# Patient Record
Sex: FEMALE | Race: WHITE | NOT HISPANIC OR LATINO | Employment: OTHER | URBAN - METROPOLITAN AREA
[De-identification: names, ages, dates, MRNs, and addresses within clinical notes are randomized per-mention and may not be internally consistent; named-entity substitution may affect disease eponyms.]

---

## 2019-02-16 ENCOUNTER — HOSPITAL ENCOUNTER (OUTPATIENT)
Dept: URGENT CARE | Facility: CLINIC | Age: 34
Discharge: HOME | End: 2019-02-16
Attending: FAMILY MEDICINE

## 2021-09-15 ENCOUNTER — OFFICE VISIT (OUTPATIENT)
Dept: GASTROENTEROLOGY | Facility: CLINIC | Age: 36
End: 2021-09-15

## 2021-09-15 ENCOUNTER — PREP FOR SURGERY (OUTPATIENT)
Dept: OTHER | Facility: HOSPITAL | Age: 36
End: 2021-09-15

## 2021-09-15 VITALS
TEMPERATURE: 98.4 F | HEIGHT: 62 IN | HEART RATE: 74 BPM | WEIGHT: 234.2 LBS | BODY MASS INDEX: 43.1 KG/M2 | DIASTOLIC BLOOD PRESSURE: 58 MMHG | SYSTOLIC BLOOD PRESSURE: 128 MMHG

## 2021-09-15 DIAGNOSIS — K76.0 FATTY LIVER: Primary | ICD-10-CM

## 2021-09-15 DIAGNOSIS — K75.81 NONALCOHOLIC STEATOHEPATITIS (NASH): ICD-10-CM

## 2021-09-15 DIAGNOSIS — R12 HEARTBURN: ICD-10-CM

## 2021-09-15 DIAGNOSIS — R19.7 DIARRHEA, UNSPECIFIED TYPE: ICD-10-CM

## 2021-09-15 DIAGNOSIS — R10.13 EPIGASTRIC PAIN: ICD-10-CM

## 2021-09-15 DIAGNOSIS — R10.13 EPIGASTRIC PAIN: Primary | ICD-10-CM

## 2021-09-15 PROBLEM — K21.9 GERD (GASTROESOPHAGEAL REFLUX DISEASE): Status: ACTIVE | Noted: 2021-09-15

## 2021-09-15 PROBLEM — F32.A DEPRESSION: Status: ACTIVE | Noted: 2021-09-15

## 2021-09-15 PROBLEM — I10 HYPERTENSION: Status: ACTIVE | Noted: 2021-09-15

## 2021-09-15 PROBLEM — G43.909 MIGRAINE: Status: ACTIVE | Noted: 2021-09-15

## 2021-09-15 PROBLEM — D64.9 ANEMIA: Status: ACTIVE | Noted: 2021-09-15

## 2021-09-15 PROCEDURE — 99204 OFFICE O/P NEW MOD 45 MIN: CPT | Performed by: NURSE PRACTITIONER

## 2021-09-15 RX ORDER — MELATONIN 10 MG
CAPSULE ORAL
COMMUNITY
End: 2023-01-06

## 2021-09-15 RX ORDER — HYDROCHLOROTHIAZIDE 25 MG/1
25 TABLET ORAL DAILY
COMMUNITY
End: 2023-01-06

## 2021-09-15 RX ORDER — MONTELUKAST SODIUM 10 MG/1
10 TABLET ORAL NIGHTLY
COMMUNITY

## 2021-09-15 RX ORDER — PROPRANOLOL HYDROCHLORIDE 20 MG/1
20 TABLET ORAL 2 TIMES DAILY
COMMUNITY

## 2021-09-15 RX ORDER — AMITRIPTYLINE HYDROCHLORIDE 50 MG/1
50 TABLET, FILM COATED ORAL NIGHTLY
COMMUNITY

## 2021-09-15 RX ORDER — OMEPRAZOLE 40 MG/1
40 CAPSULE, DELAYED RELEASE ORAL DAILY
COMMUNITY
End: 2023-01-06

## 2021-09-15 NOTE — PROGRESS NOTES
"Patient Name: Tram García   Visit Date: 09/15/2021   Patient ID: [id number]  Provider: BOSTON Elizabeth    Sex: female  Location:  Location Address:  Location Phone: [phone]  [place] [address] [phone]    YOB: 1985  Age: 35 y.o.   Primary Care Provider Phyllis Meyers APRN      Referring Provider: No ref. provider found        Chief Complaint  fx hx of cirrhosis (Pt states she felt a knot and had a US at [facility] )    History of Present Illness  New pt requests evaluation d/t mom's hx of cirrhosis.  Pt states she feels \"knots\" in her abdomen. C/O abd pain w meals for a couple months, + HB, taking Prilosec 40 mg/d for a couple years but not working well.  No dysphagia. Stable weight.  Has had some loose stools since GB removed around , can have urge FI also. 2-3 stools /day, no nocturnal stool, no blood seen in stool. Has abd cramping before BM also now.  Pt is on Xarelto d/t PE about 2 yrs ago, unknown cause. Denies seeing hematologist.   Pt states liver US showed fatty liver.   Patient does not drink alcohol, she does smoke, she also admits to taking NSAIDs as needed  for migraines  Has had COVID vaccine (2nd dose due 10/5/21)    Past Medical History:   Diagnosis Date   • Acid reflux    • Anemia    • Anxiety 2014   • Asthma    • Cholelithiasis    • Depression    • Foot sprain, right, initial encounter 10/21/2015   • GERD (gastroesophageal reflux disease)    • HTN (hypertension)     gestational   • Migraine headache    • Pineal gland cyst 2014   • Renal cyst 2014       Past Surgical History:   Procedure Laterality Date   •  SECTION     • CHOLECYSTECTOMY     • GALLBLADDER SURGERY     • LASER ABLATION     • TUBAL ABDOMINAL LIGATION  2012       Allergies   Allergen Reactions   • Amoxicillin Hives     Hives and blisters   • Tylenol [Acetaminophen] Hives       Family History   Problem Relation Age of Onset   • Diabetes Mother  " "  • Arthritis Mother    • Other Father         cardio vascular disease   • Diabetes Father    • Kidney failure Father    • Arthritis Father    • Colon cancer Neg Hx         Social History     Tobacco Use   • Smoking status: Current Every Day Smoker     Packs/day: 0.50     Types: Cigarettes   • Smokeless tobacco: Never Used   Vaping Use   • Vaping Use: Never used   Substance Use Topics   • Alcohol use: Never   • Drug use: Never       Objective     Vital Signs:   /58 (BP Location: Left arm, Patient Position: Sitting, Cuff Size: Large Adult)   Pulse 74   Temp 98.4 °F (36.9 °C) (Temporal)   Ht 157.5 cm (62\")   Wt 106 kg (234 lb 3.2 oz)   BMI 42.84 kg/m²       Physical Exam  Constitutional:       General: The patient is not in acute distress.     Appearance: Normal appearance.   HENT:      Head: Normocephalic and atraumatic.      Nose: Nose normal.   Pulmonary:      Effort: Pulmonary effort is normal. No respiratory distress.   Abdominal:      General: Abdomen is flat.      Palpations: Abdomen is soft. There is no mass.      Tenderness: There is no abdominal tenderness--tender in RUQ and epigastric area. There is no guarding.   Musculoskeletal:      Cervical back: Neck supple.      Right lower leg: No edema.      Left lower leg: No edema.   Skin:     General: Skin is warm and dry.   Neurological:      General: No focal deficit present.      Mental Status: The patient is alert and oriented to person, place, and time.      Gait: Gait normal.   Psychiatric:         Mood and Affect: Mood normal.         Speech: Speech normal.         Behavior: Behavior normal.         Thought Content: Thought content normal.     Result Review :   The following data was reviewed by: BOSTON Elizabeth on 09/15/2021:                    Assessment and Plan    Diagnoses and all orders for this visit:    1. Fatty liver (Primary)  -     Hepatitis Panel, Acute; Future  -     Hepatic Function Panel; Future  -     Protime-INR; " Future    2. Diarrhea, unspecified type  Comments:  ?post cholecystectomy  Orders:  -     Enteric Bacterial Panel - Stool, Per Rectum; Future  -     Enteric Parasite Panel - Stool, Per Rectum; Future  -     Fecal Lactoferrin - Stool, Per Rectum; Future  -     Clostridium Difficile Toxin - Stool, Per Rectum; Future    3. Nonalcoholic steatohepatitis (VILLARREAL)   -     Hepatitis Panel, Acute; Future    4. Epigastric pain    5. Heartburn            Follow Up      EGD for abd pain Surgical Risk and Benefits: Possible risks/complications, benefits, and alternatives to surgical or invasive procedure have been explained to patient and/or legal guardian; Patient has been evaluated and can tolerate anesthesia and/or sedation. Risks, benefits, and alternatives to anesthesia and sedation have been explained to patient and/or legal guardian. Xarelto per PCP.   Stools, if abnormal then colonoscopy. Can try Colestid if normal.   Avoid NSAIDs, encouraged smoking cessation  [facility], checking labs for fatty liver as well.  Encourage patient to lose weight,  low-carb diet and written information on fatty liver given to patient.  Encouraged 10% weight loss.  Patient was given instructions and counseling regarding her condition or for health maintenance advice. Please see specific information pulled into the AVS if appropriate.

## 2021-09-20 ENCOUNTER — LAB (OUTPATIENT)
Dept: LAB | Facility: HOSPITAL | Age: 36
End: 2021-09-20

## 2021-09-20 DIAGNOSIS — R19.7 DIARRHEA, UNSPECIFIED TYPE: ICD-10-CM

## 2021-09-20 DIAGNOSIS — K75.81 NONALCOHOLIC STEATOHEPATITIS (NASH): ICD-10-CM

## 2021-09-20 DIAGNOSIS — K76.0 FATTY LIVER: ICD-10-CM

## 2021-09-20 LAB
027 TOXIN: NORMAL
C DIFF TOX GENS STL QL NAA+PROBE: NEGATIVE
HAV IGM SERPL QL IA: NORMAL
HBV CORE IGM SERPL QL IA: NORMAL
HBV SURFACE AG SERPL QL IA: NORMAL
HCV AB SER DONR QL: NORMAL
INR PPP: 0.97 (ref 2–3)
LACTOFERRIN STL QL LA: NEGATIVE
PROTHROMBIN TIME: 10.7 SECONDS (ref 9.4–12)

## 2021-09-20 PROCEDURE — 85610 PROTHROMBIN TIME: CPT

## 2021-09-20 PROCEDURE — 36415 COLL VENOUS BLD VENIPUNCTURE: CPT

## 2021-09-20 PROCEDURE — 87506 IADNA-DNA/RNA PROBE TQ 6-11: CPT

## 2021-09-20 PROCEDURE — 87493 C DIFF AMPLIFIED PROBE: CPT

## 2021-09-20 PROCEDURE — 83630 LACTOFERRIN FECAL (QUAL): CPT

## 2021-09-20 PROCEDURE — 80074 ACUTE HEPATITIS PANEL: CPT

## 2021-09-20 PROCEDURE — 87505 NFCT AGENT DETECTION GI: CPT

## 2021-09-21 LAB
C COLI+JEJ+UPSA DNA STL QL NAA+NON-PROBE: NOT DETECTED
CRYPTOSP DNA STL QL NAA+NON-PROBE: NOT DETECTED
E HISTOLYT DNA STL QL NAA+NON-PROBE: NOT DETECTED
EC STX1+STX2 GENES STL QL NAA+NON-PROBE: NOT DETECTED
G LAMBLIA DNA STL QL NAA+NON-PROBE: NOT DETECTED
S ENT+BONG DNA STL QL NAA+NON-PROBE: NOT DETECTED
SHIGELLA SP+EIEC IPAH ST NAA+NON-PROBE: NOT DETECTED

## 2021-10-05 ENCOUNTER — TELEPHONE (OUTPATIENT)
Dept: GASTROENTEROLOGY | Facility: CLINIC | Age: 36
End: 2021-10-05

## 2021-10-05 PROBLEM — R12 HEARTBURN: Status: ACTIVE | Noted: 2021-10-05

## 2021-10-05 PROBLEM — R10.13 EPIGASTRIC PAIN: Status: ACTIVE | Noted: 2021-10-05

## 2021-10-05 NOTE — TELEPHONE ENCOUNTER
Pt had questions about her labs completed 9.20.21. While most came back normal the INR was low. Patient wants to know is this bad? Patient on for 10.22.21 for EGD/Colon

## 2021-10-05 NOTE — TELEPHONE ENCOUNTER
No please notify patient INR is normal, the first range reported is if you are on anticoagulants, if you look down lower you can see the normal range if not on anticoagulants

## 2021-10-22 ENCOUNTER — ANESTHESIA EVENT (OUTPATIENT)
Dept: GASTROENTEROLOGY | Facility: HOSPITAL | Age: 36
End: 2021-10-22

## 2021-10-22 ENCOUNTER — HOSPITAL ENCOUNTER (OUTPATIENT)
Facility: HOSPITAL | Age: 36
Setting detail: HOSPITAL OUTPATIENT SURGERY
Discharge: HOME | End: 2021-10-22
Attending: INTERNAL MEDICINE | Admitting: INTERNAL MEDICINE

## 2021-10-22 ENCOUNTER — ANESTHESIA (OUTPATIENT)
Dept: GASTROENTEROLOGY | Facility: HOSPITAL | Age: 36
End: 2021-10-22

## 2021-10-22 VITALS
OXYGEN SATURATION: 97 % | SYSTOLIC BLOOD PRESSURE: 112 MMHG | RESPIRATION RATE: 17 BRPM | BODY MASS INDEX: 41.13 KG/M2 | DIASTOLIC BLOOD PRESSURE: 78 MMHG | WEIGHT: 224.87 LBS | TEMPERATURE: 97.5 F | HEART RATE: 87 BPM

## 2021-10-22 DIAGNOSIS — R10.13 EPIGASTRIC PAIN: ICD-10-CM

## 2021-10-22 DIAGNOSIS — R12 HEARTBURN: ICD-10-CM

## 2021-10-22 PROCEDURE — 25010000002 PROPOFOL 10 MG/ML EMULSION: Performed by: NURSE ANESTHETIST, CERTIFIED REGISTERED

## 2021-10-22 PROCEDURE — 43239 EGD BIOPSY SINGLE/MULTIPLE: CPT | Performed by: INTERNAL MEDICINE

## 2021-10-22 PROCEDURE — 88305 TISSUE EXAM BY PATHOLOGIST: CPT | Performed by: INTERNAL MEDICINE

## 2021-10-22 RX ORDER — LIDOCAINE HYDROCHLORIDE 20 MG/ML
INJECTION, SOLUTION INFILTRATION; PERINEURAL AS NEEDED
Status: DISCONTINUED | OUTPATIENT
Start: 2021-10-22 | End: 2021-10-22 | Stop reason: SURG

## 2021-10-22 RX ORDER — SODIUM CHLORIDE, SODIUM LACTATE, POTASSIUM CHLORIDE, CALCIUM CHLORIDE 600; 310; 30; 20 MG/100ML; MG/100ML; MG/100ML; MG/100ML
30 INJECTION, SOLUTION INTRAVENOUS CONTINUOUS
Status: DISCONTINUED | OUTPATIENT
Start: 2021-10-22 | End: 2021-10-22 | Stop reason: HOSPADM

## 2021-10-22 RX ORDER — PROPOFOL 10 MG/ML
VIAL (ML) INTRAVENOUS AS NEEDED
Status: DISCONTINUED | OUTPATIENT
Start: 2021-10-22 | End: 2021-10-22 | Stop reason: SURG

## 2021-10-22 RX ADMIN — PROPOFOL 100 MG: 10 INJECTION, EMULSION INTRAVENOUS at 08:40

## 2021-10-22 RX ADMIN — PROPOFOL 250 MCG/KG/MIN: 10 INJECTION, EMULSION INTRAVENOUS at 08:35

## 2021-10-22 RX ADMIN — PROPOFOL 100 MG: 10 INJECTION, EMULSION INTRAVENOUS at 08:42

## 2021-10-22 RX ADMIN — LIDOCAINE HYDROCHLORIDE 100 MG: 20 INJECTION, SOLUTION INFILTRATION; PERINEURAL at 08:35

## 2021-10-22 RX ADMIN — SODIUM CHLORIDE, POTASSIUM CHLORIDE, SODIUM LACTATE AND CALCIUM CHLORIDE 30 ML/HR: 600; 310; 30; 20 INJECTION, SOLUTION INTRAVENOUS at 07:41

## 2021-10-22 RX ADMIN — PROPOFOL 100 MG: 10 INJECTION, EMULSION INTRAVENOUS at 08:35

## 2021-10-22 NOTE — ANESTHESIA PREPROCEDURE EVALUATION
Anesthesia Evaluation     Patient summary reviewed and Nursing notes reviewed   no history of anesthetic complications:  NPO Solid Status: > 8 hours  NPO Liquid Status: > 2 hours           Airway   Mallampati: II  TM distance: >3 FB  Neck ROM: full  No difficulty expected  Dental    (+) poor dentition    Pulmonary - normal exam    breath sounds clear to auscultation  (+) asthma,  Cardiovascular - normal exam  Exercise tolerance: good (4-7 METS)    Rhythm: regular    (+) hypertension,       Neuro/Psych  (+) headaches, psychiatric history Anxiety,     GI/Hepatic/Renal/Endo    (+)  GERD,  renal disease,     Musculoskeletal (-) negative ROS    Abdominal    Substance History - negative use     OB/GYN negative ob/gyn ROS         Other - negative ROS                       Anesthesia Plan    ASA 2     general   (Total IV Anesthesia    Patient understands anesthesia not responsible for dental damage.  )  intravenous induction     Anesthetic plan, all risks, benefits, and alternatives have been provided, discussed and informed consent has been obtained with: patient.    Plan discussed with CRNA.

## 2021-10-22 NOTE — H&P
Pre Procedure History & Physical    Chief Complaint:   Epigastric pain    Subjective     HPI:   Epigastric pain    Past Medical History:   Past Medical History:   Diagnosis Date   • Acid reflux    • Anemia    • Anxiety 2014   • Asthma    • Cholelithiasis    • Depression    • Foot sprain, right, initial encounter 10/21/2015   • GERD (gastroesophageal reflux disease)    • HTN (hypertension)     gestational   • Migraine headache    • Pineal gland cyst 2014   • Renal cyst 2014       Past Surgical History:  Past Surgical History:   Procedure Laterality Date   •  SECTION     • CHOLECYSTECTOMY     • GALLBLADDER SURGERY     • LASER ABLATION     • TUBAL ABDOMINAL LIGATION  2012       Family History:  Family History   Problem Relation Age of Onset   • Diabetes Mother    • Arthritis Mother    • Other Father         cardio vascular disease   • Diabetes Father    • Kidney failure Father    • Arthritis Father    • Colon cancer Neg Hx        Social History:   reports that she has been smoking cigarettes. She has been smoking about 0.50 packs per day. She has never used smokeless tobacco. She reports that she does not drink alcohol and does not use drugs.    Medications:   Medications Prior to Admission   Medication Sig Dispense Refill Last Dose   • amitriptyline (ELAVIL) 50 MG tablet Take 50 mg by mouth Every Night.   10/21/2021   • hydroCHLOROthiazide (HYDRODIURIL) 25 MG tablet Take 25 mg by mouth Daily.   10/21/2021   • Melatonin 10 MG capsule Take  by mouth.   10/21/2021   • montelukast (SINGULAIR) 10 MG tablet Take 10 mg by mouth Every Night.   10/21/2021   • omeprazole (priLOSEC) 40 MG capsule Take 40 mg by mouth Daily.   10/21/2021   • propranolol (INDERAL) 20 MG tablet Take 20 mg by mouth 2 (Two) Times a Day.   10/21/2021   • rivaroxaban (XARELTO) 20 MG tablet Take 20 mg by mouth Daily.   10/19/2021 at Unknown time   • vitamin D3 125 MCG (5000 UT) capsule capsule Take 5,000 Units by mouth  Daily.   10/21/2021       Allergies:  Sumatriptan, Amoxicillin, and Tylenol [acetaminophen]        Objective     Blood pressure (!) 150/104, pulse 92, temperature 98.6 °F (37 °C), temperature source Temporal, resp. rate 20, weight 102 kg (224 lb 13.9 oz), SpO2 95 %.    Physical Exam   Constitutional: Pt is oriented to person, place, and time and well-developed, well-nourished, and in no distress.   Mouth/Throat: Oropharynx is clear and moist.   Neck: Normal range of motion.   Cardiovascular: Normal rate, regular rhythm and normal heart sounds.    Pulmonary/Chest: Effort normal and breath sounds normal.   Abdominal: Soft. Nontender  Skin: Skin is warm and dry.   Psychiatric: Mood, memory, affect and judgment normal.     Assessment/Plan     Diagnosis:  Epigastric pain    Anticipated Surgical Procedure:  EGD    The risks, benefits, and alternatives of this procedure have been discussed with the patient or the responsible party- the patient understands and agrees to proceed.

## 2021-10-22 NOTE — ANESTHESIA POSTPROCEDURE EVALUATION
Patient: Tram García    Procedure Summary     Date: 10/22/21 Room / Location: [facility] ENDOSCOPY 4 / [facility] ENDOSCOPY    Anesthesia Start: 0831 Anesthesia Stop: 0852    Procedure: ESOPHAGOGASTRODUODENOSCOPY WITH BIOPSIES (N/A ) Diagnosis:       Epigastric pain      Heartburn      (Epigastric pain [R10.13])      (Heartburn [R12])    Surgeons: Feliz Olguin MD Provider: Bud Martinez MD    Anesthesia Type: general ASA Status: 2          Anesthesia Type: general    Vitals  Vitals Value Taken Time   /93 10/22/21 0900   Temp 36.1 °C (97 °F) 10/22/21 0850   Pulse 102 10/22/21 0900   Resp 17 10/22/21 0900   SpO2 94 % 10/22/21 0900           Post Anesthesia Care and Evaluation    Patient location during evaluation: bedside  Patient participation: complete - patient participated  Level of consciousness: awake and alert  Pain management: adequate  Airway patency: patent  Anesthetic complications: No anesthetic complications  PONV Status: none  Cardiovascular status: acceptable  Respiratory status: acceptable  Hydration status: acceptable

## 2021-10-25 LAB
CYTO UR: NORMAL
LAB AP CASE REPORT: NORMAL
LAB AP CLINICAL INFORMATION: NORMAL
PATH REPORT.FINAL DX SPEC: NORMAL
PATH REPORT.GROSS SPEC: NORMAL

## 2022-01-28 ENCOUNTER — TELEPHONE (OUTPATIENT)
Dept: GASTROENTEROLOGY | Facility: CLINIC | Age: 37
End: 2022-01-28

## 2022-01-28 NOTE — TELEPHONE ENCOUNTER
Called pt. About overdue results. Pt. Stated she didn't realize there was a lab that needed to be done and that she will go soon.

## 2022-09-29 ENCOUNTER — OFFICE VISIT (OUTPATIENT)
Dept: NEUROSURGERY | Facility: CLINIC | Age: 37
End: 2022-09-29

## 2022-09-29 VITALS
BODY MASS INDEX: 40.59 KG/M2 | HEIGHT: 62 IN | DIASTOLIC BLOOD PRESSURE: 107 MMHG | WEIGHT: 220.6 LBS | SYSTOLIC BLOOD PRESSURE: 150 MMHG

## 2022-09-29 DIAGNOSIS — M54.42 CHRONIC MIDLINE LOW BACK PAIN WITH BILATERAL SCIATICA: Primary | ICD-10-CM

## 2022-09-29 DIAGNOSIS — M51.36 DDD (DEGENERATIVE DISC DISEASE), LUMBAR: ICD-10-CM

## 2022-09-29 DIAGNOSIS — M51.27 HERNIATED NUCLEUS PULPOSUS, L5-S1: ICD-10-CM

## 2022-09-29 DIAGNOSIS — M54.41 CHRONIC MIDLINE LOW BACK PAIN WITH BILATERAL SCIATICA: Primary | ICD-10-CM

## 2022-09-29 DIAGNOSIS — G89.29 CHRONIC MIDLINE LOW BACK PAIN WITH BILATERAL SCIATICA: Primary | ICD-10-CM

## 2022-09-29 PROCEDURE — 99203 OFFICE O/P NEW LOW 30 MIN: CPT | Performed by: NEUROLOGICAL SURGERY

## 2022-09-29 NOTE — PROGRESS NOTES
"Chief Complaint  Back Pain    Subjective          Tram García who is a 36 y.o. year old female who presents to [facility] for Evaluation of the Spine.     The patient complains of pain located in the lumbar spine.  Patients states the pain has been present for 6 months.  The pain came on acutely.  The pain scale level is 9.  The pain radiates into the bilateral legs to the posterior calf. The back pain is worse than the leg.  The pain is constant and described as dull.  The pain is worse at no particular time of day. Patient states nothing worsens the pain. Patient states nothing makes the pain better.    Associated Symptoms Include: Denies numbness and tingling  Conservative Interventions Include: PT-no help, otherwise no treatments, doesn't want to take pain killers    Was this the result of an injury or accident?: No    History of Previous Spinal Surgery?: No    This patient  reports that she has been smoking cigarettes. She has a 15.00 pack-year smoking history. She has never used smokeless tobacco.    Review of Systems   Musculoskeletal: Positive for arthralgias, back pain and myalgias.        Objective   Vital Signs:   BP (!) 150/107 (BP Location: Left arm, Patient Position: Sitting)   Ht 157.5 cm (62\")   Wt 100 kg (220 lb 9.6 oz)   BMI 40.35 kg/m²       Physical Exam  Pulmonary:      Effort: Pulmonary effort is normal.   Musculoskeletal:      Comments: SLR increases pain in the bilateral calf   Neurological:      Mental Status: She is alert.      Sensory: No sensory deficit.      Motor: No weakness.      Deep Tendon Reflexes: Reflexes normal (1/4).   Psychiatric:         Mood and Affect: Mood normal.         Result Review :   I personally reviewed the patient's MRI scan which shows DDD at L4-5 and L5-S1 with a central disc bulge without notable nerve compression.        Assessment and Plan    Diagnoses and all orders for this visit:    1. 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 DDD (degenerative disc disease), lumbar    3. Herniated nucleus pulposus, L5-S1    Surgery would not likely help the lower back pain. She will monitor for any worsened leg pain.    She will work on core strength, smoking cessation and weight loss as well as avoidance of activities that worsen the pain.    She could possibly benefit from inversion and also TENS.     We discussed the importance of smoking/nicotine cessation. Smoking/nicotine use has multiple health risks. In particular related to the spine, nicotine increases the incidence of lower back pain, speeds up the progression of degenerative disc disease and dramatically reduces healing after spine surgery (particularly a fusion operation).     Follow Up   No follow-ups on file.  Patient was given instructions and counseling regarding her condition or for health maintenance advice. Please see specific information pulled into the AVS if appropriate.

## 2023-01-04 ENCOUNTER — HOSPITAL ENCOUNTER (EMERGENCY)
Facility: HOSPITAL | Age: 38
Discharge: HOME | End: 2023-01-04
Attending: EMERGENCY MEDICINE | Admitting: EMERGENCY MEDICINE
Payer: MEDICARE

## 2023-01-04 VITALS
OXYGEN SATURATION: 99 % | SYSTOLIC BLOOD PRESSURE: 115 MMHG | DIASTOLIC BLOOD PRESSURE: 89 MMHG | BODY MASS INDEX: 40.16 KG/M2 | TEMPERATURE: 98.7 F | RESPIRATION RATE: 20 BRPM | HEIGHT: 62 IN | HEART RATE: 101 BPM | WEIGHT: 218.26 LBS

## 2023-01-04 DIAGNOSIS — J02.9 PHARYNGITIS, UNSPECIFIED ETIOLOGY: Primary | ICD-10-CM

## 2023-01-04 DIAGNOSIS — R05.1 ACUTE COUGH: ICD-10-CM

## 2023-01-04 DIAGNOSIS — Z20.822 ENCOUNTER FOR LABORATORY TESTING FOR COVID-19 VIRUS: ICD-10-CM

## 2023-01-04 DIAGNOSIS — R09.81 NASAL CONGESTION: ICD-10-CM

## 2023-01-04 LAB
FLUAV AG NPH QL: NEGATIVE
FLUBV AG NPH QL IA: NEGATIVE
S PYO AG THROAT QL: NEGATIVE
SARS-COV-2 RNA PNL SPEC NAA+PROBE: NOT DETECTED

## 2023-01-04 PROCEDURE — 96372 THER/PROPH/DIAG INJ SC/IM: CPT

## 2023-01-04 PROCEDURE — 87081 CULTURE SCREEN ONLY: CPT | Performed by: EMERGENCY MEDICINE

## 2023-01-04 PROCEDURE — U0004 COV-19 TEST NON-CDC HGH THRU: HCPCS | Performed by: EMERGENCY MEDICINE

## 2023-01-04 PROCEDURE — C9803 HOPD COVID-19 SPEC COLLECT: HCPCS | Performed by: EMERGENCY MEDICINE

## 2023-01-04 PROCEDURE — 87880 STREP A ASSAY W/OPTIC: CPT | Performed by: EMERGENCY MEDICINE

## 2023-01-04 PROCEDURE — 99283 EMERGENCY DEPT VISIT LOW MDM: CPT

## 2023-01-04 PROCEDURE — 87804 INFLUENZA ASSAY W/OPTIC: CPT | Performed by: EMERGENCY MEDICINE

## 2023-01-04 PROCEDURE — 25010000002 DEXAMETHASONE PER 1 MG

## 2023-01-04 RX ORDER — FLUTICASONE PROPIONATE 50 MCG
2 SPRAY, SUSPENSION (ML) NASAL DAILY
Qty: 11.1 ML | Refills: 0 | Status: SHIPPED | OUTPATIENT
Start: 2023-01-04

## 2023-01-04 RX ORDER — BROMPHENIRAMINE MALEATE, PSEUDOEPHEDRINE HYDROCHLORIDE, AND DEXTROMETHORPHAN HYDROBROMIDE 2; 30; 10 MG/5ML; MG/5ML; MG/5ML
10 SYRUP ORAL 3 TIMES DAILY PRN
Qty: 240 ML | Refills: 0 | Status: SHIPPED | OUTPATIENT
Start: 2023-01-04

## 2023-01-04 RX ORDER — LEVETIRACETAM 500 MG/1
500 TABLET ORAL 2 TIMES DAILY
COMMUNITY

## 2023-01-04 RX ORDER — METHYLPREDNISOLONE 4 MG/1
TABLET ORAL
Qty: 21 TABLET | Refills: 0 | Status: SHIPPED | OUTPATIENT
Start: 2023-01-04

## 2023-01-04 RX ORDER — FAMOTIDINE 40 MG/1
40 TABLET, FILM COATED ORAL DAILY
COMMUNITY

## 2023-01-04 RX ORDER — DEXAMETHASONE SODIUM PHOSPHATE 10 MG/ML
10 INJECTION INTRAMUSCULAR; INTRAVENOUS ONCE
Status: COMPLETED | OUTPATIENT
Start: 2023-01-04 | End: 2023-01-04

## 2023-01-04 RX ADMIN — DEXAMETHASONE SODIUM PHOSPHATE 10 MG: 10 INJECTION INTRAMUSCULAR; INTRAVENOUS at 16:28

## 2023-01-04 NOTE — DISCHARGE INSTRUCTIONS
Please know that your flu test and strep throat test were negative.  Your COVID test remains pending.  The hospital will only call you tomorrow if it is positive.  Please continue to increase your oral fluid intake particularly water or an electrolyte substance such as Powerade or Gatorade.  You may take Tylenol or Motrin as needed for low-grade fevers and general body aches.  Take the medication prescribed to you today as needed.  Return to the ER if you develop any difficulty breathing, chest pain, the worst headache of your life, a fever that you cannot control with Tylenol or Motrin, or severe nausea, vomiting, or diarrhea. Otherwise, you may follow-up with your primary care provider in 5 to 7 days.

## 2023-01-04 NOTE — ED PROVIDER NOTES
Time: 3:43 PM EST  Date of encounter:  2023  Independent Historian/Clinical History and Information was obtained by:   Patient  Chief Complaint: Sore throat    History is limited by: N/A    History of Present Illness:  Patient is a 37 y.o. year old female who presents to the emergency department for evaluation of sore throat which onset this morning. She has tried cough drops and throat spray with no improvement. Pt admits to nausea, nasal congestion, postnasal drip, and occasional dry cough, denies fever, chills, vomiting or diarrhea. States she can't eat anything due to pain, rating it a 10/10. She reports h/o recurrent strep throat since she was a child, last episode about 2 years ago.       Sore Throat  Location:  Generalized  Quality:  Aching  Severity:  Severe  Onset quality:  Sudden  Timing:  Constant  Progression:  Unchanged  Chronicity:  Recurrent  Relieved by:  Nothing  Worsened by:  Nothing  Ineffective treatments: cough drops and throat spray.  Associated symptoms: cough, postnasal drip, sinus congestion and trouble swallowing    Associated symptoms: no chills, no fever, no plugged ear sensation and no shortness of breath        Patient Care Team  Primary Care Provider: Phyllis Meyers APRN    Past Medical History:     Allergies   Allergen Reactions   • Sumatriptan Shortness Of Breath   • Amoxicillin Hives     Hives and blisters   • Tylenol [Acetaminophen] Hives     Past Medical History:   Diagnosis Date   • Abnormal ECG    • Acid reflux    • Anemia    • Anxiety 2014   • Asthma    • Bleeding disorder (HCC)    • Cholelithiasis    • Clotting disorder (HCC)    • Deep vein thrombosis (HCC) 2018   • Depression    • Foot sprain, right, initial encounter 10/21/2015   • GERD (gastroesophageal reflux disease)    • HTN (hypertension)     gestational   • Migraine headache    • Pineal gland cyst 2014   • Renal cyst 2014     Past Surgical History:   Procedure Laterality Date   •   SECTION     • CHOLECYSTECTOMY     • ENDOSCOPY N/A 10/22/2021    Procedure: ESOPHAGOGASTRODUODENOSCOPY WITH BIOPSIES;  Surgeon: Feliz Olguin MD;  Location: [facility] ENDOSCOPY;  Service: Gastroenterology;  Laterality: N/A;  NORMAL EGD   • GALLBLADDER SURGERY     • LASER ABLATION     • TUBAL ABDOMINAL LIGATION  03/27/2012     Family History   Problem Relation Age of Onset   • Diabetes Mother    • Arthritis Mother    • Anxiety disorder Mother    • Hyperlipidemia Mother    • Liver disease Mother    • Stroke Mother    • Thyroid disease Mother    • Other Father         cardio vascular disease   • Diabetes Father    • Kidney failure Father    • Arthritis Father    • Colon cancer Neg Hx        Home Medications:  Prior to Admission medications    Medication Sig Start Date End Date Taking? Authorizing Provider   amitriptyline (ELAVIL) 50 MG tablet Take 50 mg by mouth Every Night.    Khari Mar MD   famotidine (PEPCID) 40 MG tablet Take 40 mg by mouth Daily.    Khari Mar MD   hydroCHLOROthiazide (HYDRODIURIL) 25 MG tablet Take 25 mg by mouth Daily.    Khari Mar MD   levETIRAcetam (KEPPRA) 500 MG tablet Take 500 mg by mouth 2 (Two) Times a Day.    Khari Mar MD   Melatonin 10 MG capsule Take  by mouth.    Khari Mar MD   montelukast (SINGULAIR) 10 MG tablet Take 10 mg by mouth Every Night.    Khari Mar MD   omeprazole (priLOSEC) 40 MG capsule Take 40 mg by mouth Daily.    Khari Mar MD   propranolol (INDERAL) 20 MG tablet Take 20 mg by mouth 2 (Two) Times a Day.    Khari Mar MD   rivaroxaban (XARELTO) 20 MG tablet Take 20 mg by mouth Daily.    Khari Mar MD   vitamin D3 125 MCG (5000 UT) capsule capsule Take 5,000 Units by mouth Daily.    Khari Mar MD        Social History:   Social History     Tobacco Use   • Smoking status: Light Smoker     Packs/day: 1.00     Years: 15.00     Pack years: 15.00     Types:  Cigarettes   • Smokeless tobacco: Never   Vaping Use   • Vaping Use: Never used   Substance Use Topics   • Alcohol use: Never   • Drug use: Never         Review of Systems:  Review of Systems   Constitutional: Negative for chills and fever.   HENT: Positive for postnasal drip, sore throat and trouble swallowing.    Respiratory: Positive for cough. Negative for shortness of breath.    All other systems reviewed and are negative.       Physical Exam:  /89 (BP Location: Right arm, Patient Position: Sitting)   Pulse 101   Temp 98.7 °F (37.1 °C) (Oral)   Resp 20   Ht 157.5 cm (62\")   Wt 99 kg (218 lb 4.1 oz)   SpO2 99%   BMI 39.92 kg/m²     Physical Exam  Vitals and nursing note reviewed.   Constitutional:       General: She is not in acute distress.     Appearance: Normal appearance. She is not toxic-appearing.   HENT:      Head: Normocephalic and atraumatic.      Nose: Congestion present.      Mouth/Throat:      Mouth: Mucous membranes are moist.      Pharynx: Uvula midline. Posterior oropharyngeal erythema present.      Tonsils: 2+ on the right. 2+ on the left.      Comments: Controls her own secretions.  Eyes:      General: No scleral icterus.  Cardiovascular:      Rate and Rhythm: Normal rate and regular rhythm.      Pulses: Normal pulses.      Heart sounds: Normal heart sounds.   Pulmonary:      Effort: Pulmonary effort is normal. No respiratory distress.      Breath sounds: Normal breath sounds.   Abdominal:      General: Abdomen is flat.      Palpations: Abdomen is soft.      Tenderness: There is no abdominal tenderness.   Musculoskeletal:         General: Normal range of motion.      Cervical back: Normal range of motion and neck supple.   Skin:     General: Skin is warm and dry.   Neurological:      Mental Status: She is alert and oriented to person, place, and time. Mental status is at baseline.                  Procedures:  Procedures      Medical Decision Making:      Comorbidities that affect  care:    None    External Notes reviewed:    Previous ED Note      The following orders were placed and all results were independently analyzed by me:  Orders Placed This Encounter   Procedures   • Rapid Strep A Screen - Swab, Throat   • Influenza Antigen, Rapid - Swab, Nasopharynx   • COVID-19,APTIMA PANTHER(TOY),[facility]/ AARTI, NP/OP SWAB IN UTM/VTM/SALINE TRANSPORT MEDIA,24 HR TAT - Swab, Nasopharynx   • Beta Strep Culture, Throat - Swab, Throat       Medications Given in the Emergency Department:  Medications   dexamethasone (DECADRON) injection 10 mg (10 mg Intramuscular Given 1/4/23 0164)        ED Course:         Labs:    Lab Results (last 24 hours)     ** No results found for the last 24 hours. **           Imaging:    No Radiology Exams Resulted Within Past 24 Hours      Differential Diagnosis and Discussion:    Cough: Differential diagnosis includes but is not limited to pneumonia, acute bronchitis, upper respiratory infection, ACE inhibitor use, allergic reaction, epiglottitis, seasonal allergies, chemical irritants, exercise-induced asthma, viral syndrome.    All labs were reviewed and analyzed by me.    MDM  Number of Diagnoses or Management Options  Acute cough: new and does not require workup  Encounter for laboratory testing for COVID-19 virus: new and does not require workup  Nasal congestion: new and does not require workup  Pharyngitis, unspecified etiology: new and does not require workup     Amount and/or Complexity of Data Reviewed  Clinical lab tests: ordered and reviewed  Review and summarize past medical records: yes (I have personally reviewed patient's previous medical encounters.  )    Risk of Complications, Morbidity, and/or Mortality  Presenting problems: low  Diagnostic procedures: low  Management options: low    Patient Progress  Patient progress: stable (17:19 EST Updated patient on results and plan for discharge. Patient expressed understanding and agreement. All questions answered  at this time.)           Patient Care Considerations:          Consultants/Shared Management Plan:    None    Social Determinants of Health:    Patient is independent, reliable, and has access to care.       Disposition and Care Coordination:    Discharged: The patient is suitable and stable for discharge with no need for consideration of observation or admission.    I have explained the patient´s condition, diagnoses and treatment plan based on the information available to me at this time. I have answered questions and addressed any concerns. The patient has a good  understanding of the patient´s diagnosis, condition, and treatment plan as can be expected at this point. The vital signs have been stable. The patient´s condition is stable and appropriate for discharge from the emergency department.      The patient will pursue further outpatient evaluation with the primary care physician or other designated or consulting physician as outlined in the discharge instructions. They are agreeable to this plan of care and follow-up instructions have been explained in detail. The patient has received these instructions in written format and have expressed an understanding of the discharge instructions. The patient is aware that any significant change in condition or worsening of symptoms should prompt an immediate return to this or the closest emergency department or call to 911.    Final diagnoses:   Pharyngitis, unspecified etiology   Nasal congestion   Encounter for laboratory testing for COVID-19 virus   Acute cough        ED Disposition     ED Disposition   Discharge    Condition   Stable    Comment   --             This medical record created using voice recognition software.    Documentation assistance provided by Margaret Veras acting as scribe for BOSTON Núñez. Information recorded by the scribe was done at my direction and has been verified and validated by me.        Rito,  Margaret  01/04/23 1624       Margaret Veras  01/04/23 1719       Jennifer Miles, BOSTON  01/05/23 2013

## 2023-01-05 ENCOUNTER — APPOINTMENT (OUTPATIENT)
Dept: CT IMAGING | Facility: HOSPITAL | Age: 38
End: 2023-01-05
Payer: MEDICARE

## 2023-01-05 ENCOUNTER — NURSE TRIAGE (OUTPATIENT)
Dept: CALL CENTER | Facility: HOSPITAL | Age: 38
End: 2023-01-05
Payer: MEDICARE

## 2023-01-05 LAB
ALBUMIN SERPL-MCNC: 4.4 G/DL (ref 3.5–5.2)
ALBUMIN/GLOB SERPL: 1.4 G/DL
ALP SERPL-CCNC: 94 U/L (ref 39–117)
ALT SERPL W P-5'-P-CCNC: 54 U/L (ref 1–33)
ANION GAP SERPL CALCULATED.3IONS-SCNC: 12.2 MMOL/L (ref 5–15)
AST SERPL-CCNC: 29 U/L (ref 1–32)
BASOPHILS # BLD AUTO: 0.02 10*3/MM3 (ref 0–0.2)
BASOPHILS NFR BLD AUTO: 0.1 % (ref 0–1.5)
BILIRUB SERPL-MCNC: 0.4 MG/DL (ref 0–1.2)
BILIRUB UR QL STRIP: NEGATIVE
BUN SERPL-MCNC: 12 MG/DL (ref 6–20)
BUN/CREAT SERPL: 19.4 (ref 7–25)
CALCIUM SPEC-SCNC: 9.4 MG/DL (ref 8.6–10.5)
CHLORIDE SERPL-SCNC: 107 MMOL/L (ref 98–107)
CLARITY UR: CLEAR
CO2 SERPL-SCNC: 18.8 MMOL/L (ref 22–29)
COLOR UR: YELLOW
CREAT SERPL-MCNC: 0.62 MG/DL (ref 0.57–1)
DEPRECATED RDW RBC AUTO: 43.2 FL (ref 37–54)
EGFRCR SERPLBLD CKD-EPI 2021: 117.1 ML/MIN/1.73
EOSINOPHIL # BLD AUTO: 0.04 10*3/MM3 (ref 0–0.4)
EOSINOPHIL NFR BLD AUTO: 0.2 % (ref 0.3–6.2)
ERYTHROCYTE [DISTWIDTH] IN BLOOD BY AUTOMATED COUNT: 13.6 % (ref 12.3–15.4)
GLOBULIN UR ELPH-MCNC: 3.1 GM/DL
GLUCOSE SERPL-MCNC: 116 MG/DL (ref 65–99)
GLUCOSE UR STRIP-MCNC: NEGATIVE MG/DL
HCG INTACT+B SERPL-ACNC: <0.5 MIU/ML
HCT VFR BLD AUTO: 46.4 % (ref 34–46.6)
HGB BLD-MCNC: 16.4 G/DL (ref 12–15.9)
HGB UR QL STRIP.AUTO: NEGATIVE
HOLD SPECIMEN: NORMAL
HOLD SPECIMEN: NORMAL
IMM GRANULOCYTES # BLD AUTO: 0.14 10*3/MM3 (ref 0–0.05)
IMM GRANULOCYTES NFR BLD AUTO: 0.8 % (ref 0–0.5)
KETONES UR QL STRIP: NEGATIVE
LEUKOCYTE ESTERASE UR QL STRIP.AUTO: NEGATIVE
LIPASE SERPL-CCNC: 34 U/L (ref 13–60)
LYMPHOCYTES # BLD AUTO: 3.38 10*3/MM3 (ref 0.7–3.1)
LYMPHOCYTES NFR BLD AUTO: 19.2 % (ref 19.6–45.3)
MCH RBC QN AUTO: 30.8 PG (ref 26.6–33)
MCHC RBC AUTO-ENTMCNC: 35.3 G/DL (ref 31.5–35.7)
MCV RBC AUTO: 87.1 FL (ref 79–97)
MONOCYTES # BLD AUTO: 1.46 10*3/MM3 (ref 0.1–0.9)
MONOCYTES NFR BLD AUTO: 8.3 % (ref 5–12)
NEUTROPHILS NFR BLD AUTO: 12.61 10*3/MM3 (ref 1.7–7)
NEUTROPHILS NFR BLD AUTO: 71.4 % (ref 42.7–76)
NITRITE UR QL STRIP: NEGATIVE
NRBC BLD AUTO-RTO: 0 /100 WBC (ref 0–0.2)
PH UR STRIP.AUTO: 6.5 [PH] (ref 5–8)
PLATELET # BLD AUTO: 303 10*3/MM3 (ref 140–450)
PMV BLD AUTO: 9.9 FL (ref 6–12)
POTASSIUM SERPL-SCNC: 4.1 MMOL/L (ref 3.5–5.2)
PROT SERPL-MCNC: 7.5 G/DL (ref 6–8.5)
PROT UR QL STRIP: NEGATIVE
RBC # BLD AUTO: 5.33 10*6/MM3 (ref 3.77–5.28)
SODIUM SERPL-SCNC: 138 MMOL/L (ref 136–145)
SP GR UR STRIP: 1.01 (ref 1–1.03)
UROBILINOGEN UR QL STRIP: NORMAL
WBC NRBC COR # BLD: 17.65 10*3/MM3 (ref 3.4–10.8)
WHOLE BLOOD HOLD COAG: NORMAL
WHOLE BLOOD HOLD SPECIMEN: NORMAL

## 2023-01-05 PROCEDURE — 74177 CT ABD & PELVIS W/CONTRAST: CPT

## 2023-01-05 PROCEDURE — 81003 URINALYSIS AUTO W/O SCOPE: CPT

## 2023-01-05 PROCEDURE — 96374 THER/PROPH/DIAG INJ IV PUSH: CPT

## 2023-01-05 PROCEDURE — 85025 COMPLETE CBC W/AUTO DIFF WBC: CPT

## 2023-01-05 PROCEDURE — 25010000002 KETOROLAC TROMETHAMINE PER 15 MG: Performed by: NURSE PRACTITIONER

## 2023-01-05 PROCEDURE — 99283 EMERGENCY DEPT VISIT LOW MDM: CPT

## 2023-01-05 PROCEDURE — 80053 COMPREHEN METABOLIC PANEL: CPT

## 2023-01-05 PROCEDURE — 0 IOPAMIDOL PER 1 ML: Performed by: NURSE PRACTITIONER

## 2023-01-05 PROCEDURE — 83690 ASSAY OF LIPASE: CPT

## 2023-01-05 PROCEDURE — 84702 CHORIONIC GONADOTROPIN TEST: CPT

## 2023-01-05 RX ORDER — KETOROLAC TROMETHAMINE 30 MG/ML
30 INJECTION, SOLUTION INTRAMUSCULAR; INTRAVENOUS ONCE
Status: COMPLETED | OUTPATIENT
Start: 2023-01-05 | End: 2023-01-05

## 2023-01-05 RX ORDER — SODIUM CHLORIDE 0.9 % (FLUSH) 0.9 %
10 SYRINGE (ML) INJECTION AS NEEDED
Status: DISCONTINUED | OUTPATIENT
Start: 2023-01-05 | End: 2023-01-06 | Stop reason: HOSPADM

## 2023-01-05 RX ADMIN — IOPAMIDOL 100 ML: 755 INJECTION, SOLUTION INTRAVENOUS at 23:38

## 2023-01-05 RX ADMIN — KETOROLAC TROMETHAMINE 30 MG: 30 INJECTION, SOLUTION INTRAMUSCULAR; INTRAVENOUS at 22:24

## 2023-01-05 NOTE — TELEPHONE ENCOUNTER
Caller was evaluated [facility] ER and requesting COVID results.    Advised \"My Chart\" or PCP for results.   Reason for Disposition  • Caller requesting lab results    Additional Information  • Negative: Lab calling with strep throat test results and triager can call in prescription  • Negative: Lab calling with urinalysis test results and triager can call in prescription  • Negative: Medication questions  • Negative: ED call to PCP  • Negative: Physician call to PCP  • Negative: Call about patient who is currently hospitalized  • Negative: Lab or radiology calling with CRITICAL test results  • Negative: [1] Prescription not at pharmacy AND [2] was prescribed today by PCP  • Negative: [1] Follow-up call from patient regarding patient's clinical status AND [2] information urgent  • Negative: [1] Caller requests to speak ONLY to PCP AND [2] URGENT question  • Negative: [1] Caller requests to speak to PCP now AND [2] won't tell us reason for call  (Exception: if 10 pm to 6 am, caller must first discuss reason for the call)  • Negative: Notification of hospital admission  • Negative: Notification of death    Answer Assessment - Initial Assessment Questions  1. REASON FOR CALL or QUESTION: \"What is your reason for calling today?\" or \"How can I best  help you?\" or \"What question do you have that I can help answer?\"  Requesting COVID results  2. CALLER: Document the source of call. (e.g., laboratory, patient).      *No Answer*    Protocols used: [facility]CP [facility]

## 2023-01-06 ENCOUNTER — HOSPITAL ENCOUNTER (EMERGENCY)
Facility: HOSPITAL | Age: 38
Discharge: HOME | End: 2023-01-06
Attending: EMERGENCY MEDICINE | Admitting: EMERGENCY MEDICINE
Payer: MEDICARE

## 2023-01-06 VITALS
SYSTOLIC BLOOD PRESSURE: 121 MMHG | HEIGHT: 62 IN | BODY MASS INDEX: 39.92 KG/M2 | HEART RATE: 68 BPM | DIASTOLIC BLOOD PRESSURE: 73 MMHG | WEIGHT: 216.93 LBS | TEMPERATURE: 98.1 F | OXYGEN SATURATION: 96 % | RESPIRATION RATE: 20 BRPM

## 2023-01-06 DIAGNOSIS — R10.84 GENERALIZED ABDOMINAL PAIN: Primary | ICD-10-CM

## 2023-01-06 PROCEDURE — 25010000002 ONDANSETRON PER 1 MG: Performed by: EMERGENCY MEDICINE

## 2023-01-06 PROCEDURE — 25010000002 MORPHINE PER 10 MG: Performed by: EMERGENCY MEDICINE

## 2023-01-06 PROCEDURE — 96375 TX/PRO/DX INJ NEW DRUG ADDON: CPT

## 2023-01-06 RX ORDER — ONDANSETRON 2 MG/ML
4 INJECTION INTRAMUSCULAR; INTRAVENOUS ONCE
Status: COMPLETED | OUTPATIENT
Start: 2023-01-06 | End: 2023-01-06

## 2023-01-06 RX ORDER — DICYCLOMINE HCL 20 MG
20 TABLET ORAL EVERY 6 HOURS
Qty: 20 TABLET | Refills: 0 | Status: SHIPPED | OUTPATIENT
Start: 2023-01-06 | End: 2023-02-23

## 2023-01-06 RX ORDER — ONDANSETRON 4 MG/1
4 TABLET, ORALLY DISINTEGRATING ORAL EVERY 8 HOURS PRN
Qty: 10 TABLET | Refills: 0 | Status: SHIPPED | OUTPATIENT
Start: 2023-01-06

## 2023-01-06 RX ADMIN — ONDANSETRON 4 MG: 2 INJECTION INTRAMUSCULAR; INTRAVENOUS at 05:36

## 2023-01-06 RX ADMIN — MORPHINE SULFATE 4 MG: 4 INJECTION, SOLUTION INTRAMUSCULAR; INTRAVENOUS at 05:36

## 2023-01-06 NOTE — Clinical Note
[facility] EMERGENCY ROOM  [address]  [place] [address]  Phone: [phone]    Ryder Reji accompanied Tram García to the emergency department on 1/5/2023. They may return to work on 01/07/2023.        Thank you for choosing [facility].    Hawa Skelton RN

## 2023-01-06 NOTE — ED PROVIDER NOTES
Time: 04:30 AM EST  Date of encounter:  2023  Independent Historian/Clinical History and Information was obtained by:   Patient  Chief Complaint   Patient presents with   • Abdominal Pain       History is limited by: N/A    History of Present Illness:      Patient is a 37 y.o. year old female who presents to the emergency department for evaluation of c/o abdominal that started at her belly button and is now localized to her LUQ.    Pt reports that symptoms started tonight. Pt denies a fever, chest pain, and diarrhea, but reports vomiting. Pt reports GI issues since her gallbladder removal. Pt reports a history of pancreatitis. No alleviating or aggravating factors      History provided by:  Patient   used: No        Patient Care Team  Primary Care Provider: Phyllis Meyers APRN    Past Medical History:     Allergies   Allergen Reactions   • Sumatriptan Shortness Of Breath   • Amoxicillin Hives     Hives and blisters   • Tylenol [Acetaminophen] Hives     Past Medical History:   Diagnosis Date   • Abnormal ECG    • Acid reflux    • Anemia    • Anxiety 2014   • Asthma    • Bleeding disorder (HCC)    • Cholelithiasis    • Clotting disorder (HCC)    • Deep vein thrombosis (HCC) 2018   • Depression    • Foot sprain, right, initial encounter 10/21/2015   • GERD (gastroesophageal reflux disease)    • HTN (hypertension)     gestational   • Migraine headache    • Pineal gland cyst 2014   • Renal cyst 2014     Past Surgical History:   Procedure Laterality Date   •  SECTION     • CHOLECYSTECTOMY     • ENDOSCOPY N/A 10/22/2021    Procedure: ESOPHAGOGASTRODUODENOSCOPY WITH BIOPSIES;  Surgeon: Feliz Olguin MD;  Location: [facility] ENDOSCOPY;  Service: Gastroenterology;  Laterality: N/A;  NORMAL EGD   • GALLBLADDER SURGERY     • LASER ABLATION     • TUBAL ABDOMINAL LIGATION  2012     Family History   Problem Relation Age of Onset   • Diabetes Mother    • Arthritis  Mother    • Anxiety disorder Mother    • Hyperlipidemia Mother    • Liver disease Mother    • Stroke Mother    • Thyroid disease Mother    • Other Father         cardio vascular disease   • Diabetes Father    • Kidney failure Father    • Arthritis Father    • Colon cancer Neg Hx        Home Medications:  Prior to Admission medications    Medication Sig Start Date End Date Taking? Authorizing Provider   amitriptyline (ELAVIL) 50 MG tablet Take 50 mg by mouth Every Night.    Khari Mar MD   brompheniramine-pseudoephedrine-DM 30-2-10 MG/5ML syrup Take 10 mL by mouth 3 (Three) Times a Day As Needed for Cough or Congestion. 1/4/23   Jennifer Miles APRN   famotidine (PEPCID) 40 MG tablet Take 40 mg by mouth Daily.    Khari Mar MD   fluticasone (FLONASE) 50 MCG/ACT nasal spray 2 sprays into the nostril(s) as directed by provider Daily. 1/4/23   Jennifer Miles APRN   hydroCHLOROthiazide (HYDRODIURIL) 25 MG tablet Take 25 mg by mouth Daily.    Khari Mar MD   levETIRAcetam (KEPPRA) 500 MG tablet Take 500 mg by mouth 2 (Two) Times a Day.    Khari Mar MD   Melatonin 10 MG capsule Take  by mouth.    Khari Mar MD   methylPREDNISolone (MEDROL) 4 MG dose pack Take as directed on package instructions. 1/4/23   Jennifer Miles APRN   montelukast (SINGULAIR) 10 MG tablet Take 10 mg by mouth Every Night.    Khari Mar MD   omeprazole (priLOSEC) 40 MG capsule Take 40 mg by mouth Daily.    Khari Mar MD   propranolol (INDERAL) 20 MG tablet Take 20 mg by mouth 2 (Two) Times a Day.    Khari Mar MD   rivaroxaban (XARELTO) 20 MG tablet Take 20 mg by mouth Daily.    Khari Mar MD   vitamin D3 125 MCG (5000 UT) capsule capsule Take 5,000 Units by mouth Daily.    Khari Mar MD        Social History:   Social History     Tobacco Use   • Smoking status: Former     Packs/day: 1.00     Years: 15.00     Pack  "years: 15.00     Types: Cigarettes   • Smokeless tobacco: Never   Vaping Use   • Vaping Use: Never used   Substance Use Topics   • Alcohol use: Never   • Drug use: Never         Review of Systems:  Review of Systems   Constitutional: Negative for chills and fever.   HENT: Negative for congestion, ear pain and sore throat.    Eyes: Negative for pain.   Respiratory: Negative for cough, chest tightness and shortness of breath.    Cardiovascular: Negative for chest pain.   Gastrointestinal: Positive for abdominal pain and vomiting. Negative for diarrhea and nausea.   Genitourinary: Negative for flank pain and hematuria.   Musculoskeletal: Negative for joint swelling.   Skin: Negative for pallor.   Neurological: Negative for seizures and headaches.   All other systems reviewed and are negative.       Physical Exam:  /73   Pulse 68   Temp 98.1 °F (36.7 °C) (Oral)   Resp 20   Ht 157.5 cm (62\")   Wt 98.4 kg (216 lb 14.9 oz)   SpO2 96%   BMI 39.68 kg/m²     Physical Exam  Vitals and nursing note reviewed.   Constitutional:       General: She is not in acute distress.     Appearance: Normal appearance. She is not toxic-appearing.   HENT:      Head: Normocephalic and atraumatic.      Nose: Nose normal.      Mouth/Throat:      Mouth: Mucous membranes are moist.   Eyes:      General: No scleral icterus.     Extraocular Movements: Extraocular movements intact.      Conjunctiva/sclera: Conjunctivae normal.      Pupils: Pupils are equal, round, and reactive to light.   Cardiovascular:      Rate and Rhythm: Normal rate and regular rhythm.      Pulses: Normal pulses.      Heart sounds: Normal heart sounds.   Pulmonary:      Effort: Pulmonary effort is normal. No respiratory distress.      Breath sounds: Normal breath sounds.   Abdominal:      General: Abdomen is flat. There is no distension.      Palpations: Abdomen is soft.      Tenderness: There is abdominal tenderness (mild diffuse). There is no guarding or rebound. "   Musculoskeletal:         General: Normal range of motion.      Cervical back: Normal range of motion and neck supple.   Skin:     General: Skin is warm and dry.      Capillary Refill: Capillary refill takes less than 2 seconds.   Neurological:      General: No focal deficit present.      Mental Status: She is alert and oriented to person, place, and time. Mental status is at baseline.   Psychiatric:         Mood and Affect: Mood normal.         Behavior: Behavior normal.                  Procedures:  Procedures      Medical Decision Making:      Comorbidities that affect care:    Hypertension    External Notes reviewed:    Previous ED Note      The following orders were placed and all results were independently analyzed by me:  Orders Placed This Encounter   Procedures   • CT Abdomen Pelvis With Contrast   • [place] Draw   • Comprehensive Metabolic Panel   • Lipase   • Urinalysis With Microscopic If Indicated (No Culture) - Urine, Clean Catch   • hCG, Quantitative, Pregnancy   • CBC Auto Differential   • Undress & Gown   • CBC & Differential   • Green Top (Gel)   • Lavender Top   • Gold Top - SST   • Light Blue Top       Medications Given in the Emergency Department:  Medications   ketorolac (TORADOL) injection 30 mg (30 mg Intravenous Given 1/5/23 2224)   iopamidol (ISOVUE-370) 76 % injection 100 mL (100 mL Intravenous Given 1/5/23 2338)   morphine injection 4 mg (4 mg Intravenous Given 1/6/23 0536)   ondansetron (ZOFRAN) injection 4 mg (4 mg Intravenous Given 1/6/23 0536)        ED Course:    The patient was initially evaluated in the triage area where orders were placed. The patient was later dispositioned by Bonifacio Reid MD.      The patient was advised to stay for completion of workup which includes but is not limited to communication of labs and radiological results, reassessment and plan. The patient was advised that leaving prior to disposition by a provider could result in critical findings that  are not communicated to the patient.     ED Course as of 01/06/23 1550   Thu Jan 05, 2023 2216 The patient was seen and evaluated by cecelia Colmenares in triage. Orders were placed and the patient is currently awaiting disposition. [KS]      ED Course User Index  [KS] Torri Colmenares APRN       Labs:    Lab Results (last 24 hours)     Procedure Component Value Units Date/Time    CBC & Differential [[id number]]  (Abnormal) Collected: 01/05/23 2219    Specimen: Blood Updated: 01/05/23 2227    Narrative:      The following orders were created for panel order CBC & Differential.  Procedure                               Abnormality         Status                     ---------                               -----------         ------                     CBC Auto Differential[[id number]]        Abnormal            Final result                 Please view results for these tests on the individual orders.    Comprehensive Metabolic Panel [[id number]]  (Abnormal) Collected: 01/05/23 2219    Specimen: Blood Updated: 01/05/23 2250     Glucose 116 mg/dL      BUN 12 mg/dL      Creatinine 0.62 mg/dL      Sodium 138 mmol/L      Potassium 4.1 mmol/L      Comment: Slight hemolysis detected by analyzer. Results may be affected.        Chloride 107 mmol/L      CO2 18.8 mmol/L      Calcium 9.4 mg/dL      Total Protein 7.5 g/dL      Albumin 4.4 g/dL      ALT (SGPT) 54 U/L      AST (SGOT) 29 U/L      Comment: Slight hemolysis detected by analyzer. Results may be affected.        Alkaline Phosphatase 94 U/L      Total Bilirubin 0.4 mg/dL      Globulin 3.1 gm/dL      A/G Ratio 1.4 g/dL      BUN/Creatinine Ratio 19.4     Anion Gap 12.2 mmol/L      eGFR 117.1 mL/min/1.73      Comment: National Kidney Foundation and American Society of Nephrology (ASN) Task Force recommended calculation based on the Chronic Kidney Disease Epidemiology Collaboration (CKD-EPI) equation refit without adjustment for race.       Narrative:      GFR Normal  >60  Chronic Kidney Disease <60  Kidney Failure <15      Lipase [[id number]]  (Normal) Collected: 01/05/23 2219    Specimen: Blood Updated: 01/05/23 2248     Lipase 34 U/L     Urinalysis With Microscopic If Indicated (No Culture) - Urine, Clean Catch [[id number]]  (Normal) Collected: 01/05/23 2219    Specimen: Urine, Clean Catch Updated: 01/05/23 2256     Color, UA Yellow     Appearance, UA Clear     pH, UA 6.5     Specific Gravity, UA 1.006     Glucose, UA Negative     Ketones, UA Negative     Bilirubin, UA Negative     Blood, UA Negative     Protein, UA Negative     Leuk Esterase, UA Negative     Nitrite, UA Negative     Urobilinogen, UA 0.2 E.U./dL    Narrative:      Urine microscopic not indicated.    hCG, Quantitative, Pregnancy [[id number]] Collected: 01/05/23 2219    Specimen: Blood Updated: 01/05/23 2244     HCG Quantitative <0.50 mIU/mL     Narrative:      HCG Ranges by Gestational Age    Females - non-pregnant premenopausal   </= 1mIU/mL HCG  Females - postmenopausal               </= 7mIU/mL HCG    3 Weeks       5.4   -      72 mIU/mL  4 Weeks      10.2   -     708 mIU/mL  5 Weeks       217   -   8,245 mIU/mL  6 Weeks       152   -  32,177 mIU/mL  7 Weeks     4,059   - 153,767 mIU/mL  8 Weeks    31,366   - 149,094 mIU/mL  9 Weeks    59,109   - 135,901 mIU/mL  10 Weeks   44,186   - 170,409 mIU/mL  12 Weeks   27,107   - 201,615 mIU/mL  14 Weeks   24,302   -  93,646 mIU/mL  15 Weeks   12,540   -  69,747 mIU/mL  16 Weeks    8,904   -  55,332 mIU/mL  17 Weeks    8,240   -  51,793 mIU/mL  18 Weeks    9,649   -  55,271 mIU/mL    Results may be falsely decreased if patient taking Biotin.      CBC Auto Differential [[id number]]  (Abnormal) Collected: 01/05/23 2219    Specimen: Blood Updated: 01/05/23 2227     WBC 17.65 10*3/mm3      RBC 5.33 10*6/mm3      Hemoglobin 16.4 g/dL      Hematocrit 46.4 %      MCV 87.1 fL      MCH 30.8 pg      MCHC 35.3 g/dL      RDW 13.6 %      RDW-SD 43.2 fl      MPV 9.9 fL      Platelets  303 10*3/mm3      Neutrophil % 71.4 %      Lymphocyte % 19.2 %      Monocyte % 8.3 %      Eosinophil % 0.2 %      Basophil % 0.1 %      Immature Grans % 0.8 %      Neutrophils, Absolute 12.61 10*3/mm3      Lymphocytes, Absolute 3.38 10*3/mm3      Monocytes, Absolute 1.46 10*3/mm3      Eosinophils, Absolute 0.04 10*3/mm3      Basophils, Absolute 0.02 10*3/mm3      Immature Grans, Absolute 0.14 10*3/mm3      nRBC 0.0 /100 WBC            Imaging:    CT Abdomen Pelvis With Contrast    Result Date: 1/6/2023  PROCEDURE: CT ABDOMEN PELVIS W CONTRAST  COMPARISONS: 5/5/2022; 8/23/2019.  INDICATIONS: The pt. states upper abdominal pain x 1 day w/ nausea, vomiting, & diarrhea.  TECHNIQUE: After obtaining the patient's consent, 562 CT images were created with non-ionic intravenous contrast material.  No oral contrast agent was administered for the study.  PROTOCOL:   Standard CT imaging protocol performed.    RADIATION:   Total DLP: 709.7 mGy*cm.   Automated exposure control was utilized to minimize radiation dose. CONTRAST: 100 mL Isovue 370 I.V.  FINDINGS: Diffuse hepatic steatosis with hepatomegaly is seen.  No splenomegaly.  The patient has undergone cholecystectomy.  There may be some degree of pancreatic lipomatosis, especially involving the pancreatic tail, seen previously, and not significantly changed.  There is new vague hyperdensity/hyperenhancement involving the medial segment of the left lobe of the liver, which may be related to transient hepatic attenuation/enhancement differences.  This finding is thought less likely to represent a hypervascular mass or vascular malformation.  Consider imaging follow-up if clinically warranted.  No adrenal mass.  There is nonobstructing left nephrolithiasis with a 2 to 3 mm upper pole calyceal stone, as seen on image 158 of series 203.  No definite right nephrolithiasis is seen.  No obstructive uropathy is appreciated due to ureterolithiasis.  No hydronephrosis.  No acute  pyelonephritis.  No acute appendicitis, colitis, or diverticulitis.  There are scattered colonic diverticula.  No mechanical bowel obstruction.  No pneumoperitoneum or pneumatosis.  The patient has undergone bilateral tubal ligation.  There is a suspected benign functional left ovarian cyst, measuring about 3 cm, as seen on image 92 of series 201.  Pelvic phleboliths are seen.  No urinary bladder calculi are identified.  The urinary bladder is underdistended.  No acute fracture or aggressive osseous lesion is seen.  No acute infiltrate is suspected in the partially imaged lung bases.  There may be mild atelectasis.  A tiny fat-containing umbilical hernia is seen.  It does not contain bowel.  Atherosclerotic changes involve the celiac trunk with possible moderate-to-high-grade stenosis at its origin.  This finding is thought to be chronic.  The superior mesenteric and inferior mesenteric arteries are patent.  Mild atherosclerotic change is seen elsewhere in the aortoiliac arterial system.       No acute findings are appreciated.  Please see above comments for further detail.    Please note that portions of this note were completed with a voice recognition program.  ALMAS GARCIA JR, MD       Electronically Signed and Approved By: ALMAS GARCIA JR, MD on 1/06/2023 at 0:28                 Differential Diagnosis and Discussion:      Abdominal Pain: Based on the patient's signs and symptoms, I considered abdominal aortic aneurysm, small bowel obstruction, pancreatitis, acute cholecystitis, acute appendecitis, peptic ulcer disease, gastritis, colitis, endocrine disorders, irritable bowel syndrome and other differential diagnosis an etiology of the patient's abdominal pain.  Vomiting: Differential diagnosis includes but is not limited to migraine, labyrinthine disorders, psychogenic, metabolic and endocrine causes, peptic ulcer, gastric outlet obstruction, gastritis, gastroenteritis, appendicitis, intestinal obstruction,  paralytic ileus, food poisoning, cholecystitis, acute hepatitis, acute pancreatitis, acute febrile illness, and myocardial infarction.    All labs were reviewed and analyzed by me.    MDM  Number of Diagnoses or Management Options  Generalized abdominal pain  Diagnosis management comments: Patient is afebrile and nontoxic appearing. The patient is resting comfortably and feels better, is alert and in no distress. Repeat examination is unremarkable and benign; in particular, there's no discomfort at McBurney's point and there is no pulsatile mass. The history, exam, diagnostic testing, and current condition does not suggest acute appendicitis, bowel obstruction, acute cholecystitis, bowel perforation, major gastrointestinal bleeding, severe diverticulitis, abdominal aortic aneurysm, mesenteric ischemia, volvulus, sepsis, or other significant pathology that warrants further testing, continued ED treatment, admission, for surgical evaluation at this point. The vital signs have been stable. The patient does not have uncontrollable pain, intractable vomiting, or other significant symptoms. The patient's condition is stable and appropriate for discharge from the emergency department. Recommended close follow up with their primary care physician. Discussed return precautions, discharge instructions and answered all their questions.         Amount and/or Complexity of Data Reviewed  Clinical lab tests: ordered and reviewed  Tests in the radiology section of CPT®: ordered and reviewed  Review and summarize past medical records: yes  Independent visualization of images, tracings, or specimens: yes    Risk of Complications, Morbidity, and/or Mortality  Presenting problems: low  Management options: low             Patient Care Considerations:    None      Consultants/Shared Management Plan:    None    Social Determinants of Health:    Patient is independent, reliable, and has access to care.       Disposition and Care  Coordination:    Discharged: I considered escalation of care by admitting this patient for observation, however the patient has improved and is suitable and  stable for discharge.    I have explained the patient´s condition, diagnoses and treatment plan based on the information available to me at this time. I have answered questions and addressed any concerns. The patient has a good  understanding of the patient´s diagnosis, condition, and treatment plan as can be expected at this point. The vital signs have been stable. The patient´s condition is stable and appropriate for discharge from the emergency department.      The patient will pursue further outpatient evaluation with the primary care physician or other designated or consulting physician as outlined in the discharge instructions. They are agreeable to this plan of care and follow-up instructions have been explained in detail. The patient has received these instructions in written format and have expressed an understanding of the discharge instructions. The patient is aware that any significant change in condition or worsening of symptoms should prompt an immediate return to this or the closest emergency department or call to 911.  I have explained discharge medications and the need for follow up with the patient/caretakers. This was also printed in the discharge instructions. Patient was discharged with the following medications and follow up:      Medication List      New Prescriptions    dicyclomine 20 MG tablet  Commonly known as: BENTYL  Take 1 tablet by mouth Every 6 (Six) Hours.     ondansetron ODT 4 MG disintegrating tablet  Commonly known as: ZOFRAN-ODT  Place 1 tablet on the tongue Every 8 (Eight) Hours As Needed for Nausea or Vomiting.           Where to Get Your Medications      These medications were sent to [facility] [place], [place] - [address] - [phone] [facility] [phone] FX  [address] [facility] [place] [address]     Phone: [phone]   · dicyclomine 20 MG tablet  · ondansetron ODT 4 MG disintegrating tablet      Phyllis Meyers, APRN  [address] [place], [place] [id number] [phone]    In 2 days         Final diagnoses:   Generalized abdominal pain        ED Disposition     ED Disposition   Discharge    Condition   Stable    Comment   --             This medical record created using voice recognition software.      Documentation assistance provided by Silverio Lozada acting as scribe for Bonifacio Reid MD. Information recorded by the scribe was done at my direction and has been verified and validated by me.        Silverio Lozada  01/06/23 9101       Bonifacio Reid MD  01/06/23 9973

## 2023-01-07 LAB — BACTERIA SPEC AEROBE CULT: NORMAL

## 2023-02-22 NOTE — PROGRESS NOTES
"Chief Complaint  Abdominal pain     Tram García is a 37 y.o. female who presents to [facility] for abdominal pain     History of present Illness  Established care 9/2021 for \"knots\" in her abdomen, abdominal pain with meals, heartburn, and fatty liver.  Stool studies 9/20/2021-negative C. difficile, lactoferrin, parasite, and bacteria    EGD 10/22/2021 by Dr. Olguin -normal esophagus, stomach, and duodenum.  Biopsies consistent with reflux esophagitis.    CT 01/05/2023: Diffuse hepatic steatosis, hepatomegaly, evidence of cholecystectomy, pancreatic lipomatosis    Patient presents to the office for abdominal pain.  Patient has pertinent history of hospitalization 11/29/2022 for idiopathic acute pancreatitis.  Patient continues to have periumbilical pain that radiates to her left and right side.  Pain is persistent, not necessarily precipitated by eating.  Abdomen is tender to touch.  Heartburn well controlled with Prilosec 40 mg daily.  Denies nausea, vomiting, and dysphagia.  Patient has long history of loose bowel movements since cholecystectomy.  Reports 3-4 bowel movements daily.  Denies melena and hematochezia. Denies alcohol use, IV drug use, unprofessional tattoos, history of or exposure to hepatitis, and history of blood transfusions.  Pertinent family history of cirrhosis from unknown cause in her mother.    Past Medical History:   Diagnosis Date   • Abnormal ECG    • Acid reflux    • Anemia    • Anxiety 02/17/2014   • Asthma    • Bleeding disorder (HCC)    • Cholelithiasis    • Clotting disorder (HCC)    • Deep vein thrombosis (HCC) 2018   • Depression    • Foot sprain, right, initial encounter 10/21/2015   • GERD (gastroesophageal reflux disease)    • HTN (hypertension)     gestational   • Migraine headache    • Pancreatitis 11/2022    [facility] -[facility]PAT[facility]   • Pineal gland cyst 02/21/2014   • Renal cyst 02/21/2014       Past Surgical History:   Procedure " Laterality Date   •  SECTION     • CHOLECYSTECTOMY     • ENDOSCOPY N/A 10/22/2021    Procedure: ESOPHAGOGASTRODUODENOSCOPY WITH BIOPSIES;  Surgeon: Feliz Olguin MD;  Location: [facility] ENDOSCOPY;  Service: Gastroenterology;  Laterality: N/A;  NORMAL EGD   • GALLBLADDER SURGERY     • LASER ABLATION     • TUBAL ABDOMINAL LIGATION  2012         Current Outpatient Medications:   •  amitriptyline (ELAVIL) 50 MG tablet, Take 50 mg by mouth Every Night., Disp: , Rfl:   •  Cholecalciferol (Vitamin D3) 1.25 MG (43196 UT) capsule, Take 1 capsule by mouth 1 (One) Time Per Week., Disp: , Rfl:   •  famotidine (PEPCID) 40 MG tablet, Take 40 mg by mouth Daily., Disp: , Rfl:   •  levETIRAcetam (KEPPRA) 500 MG tablet, Take 500 mg by mouth 2 (Two) Times a Day., Disp: , Rfl:   •  montelukast (SINGULAIR) 10 MG tablet, Take 10 mg by mouth Every Night., Disp: , Rfl:   •  naproxen (NAPROSYN) 500 MG tablet, Take 1 tablet by mouth Every 12 (Twelve) Hours., Disp: , Rfl:   •  omeprazole (priLOSEC) 40 MG capsule, Take 1 capsule by mouth Daily., Disp: , Rfl:   •  ondansetron ODT (ZOFRAN-ODT) 4 MG disintegrating tablet, Place 1 tablet on the tongue Every 8 (Eight) Hours As Needed for Nausea or Vomiting., Disp: 10 tablet, Rfl: 0  •  propranolol (INDERAL) 20 MG tablet, Take 20 mg by mouth 2 (Two) Times a Day., Disp: , Rfl:   •  rivaroxaban (XARELTO) 20 MG tablet, Take 20 mg by mouth Daily., Disp: , Rfl:   •  sertraline (ZOLOFT) 50 MG tablet, Take 1 tablet by mouth Daily., Disp: , Rfl:   •  sucralfate (CARAFATE) 1 g tablet, TAKE ONE TABLET BY MOUTH FOUR TIMES DAILY (BEFORE MEALS AND nightly), Disp: , Rfl:   •  brompheniramine-pseudoephedrine-DM 30-2-10 MG/5ML syrup, Take 10 mL by mouth 3 (Three) Times a Day As Needed for Cough or Congestion., Disp: 240 mL, Rfl: 0  •  colestipol (COLESTID) 1 g tablet, Take 1-2 tablets by mouth 2 (Two) Times a Day., Disp: 120 tablet, Rfl: 1  •  fluticasone (FLONASE) 50 MCG/ACT nasal spray, 2  "sprays into the nostril(s) as directed by provider Daily., Disp: 11.1 mL, Rfl: 0  •  methylPREDNISolone (MEDROL) 4 MG dose pack, Take as directed on package instructions., Disp: 21 tablet, Rfl: 0  •  oxyCODONE (ROXICODONE) 5 MG immediate release tablet, TAKE ONE TABLET BY MOUTH EVERY 4 HOURS AS NEEDED FOR UP TO 2 DAYS, Disp: , Rfl:   •  PEG 3350-KCl-NaBcb-NaCl-NaSulf (Golytely) 227.1 g pack, Take 4,000 mL by mouth 1 (One) Time for 1 dose. Take as directed by the office for colon prep, Disp: 1 each, Rfl: 0  •  vitamin D3 125 MCG (5000 UT) capsule capsule, Take 5,000 Units by mouth Daily., Disp: , Rfl:      Allergies   Allergen Reactions   • Sumatriptan Shortness Of Breath   • Amoxicillin Hives     Hives and blisters   • Tylenol [Acetaminophen] Hives       Family History   Problem Relation Age of Onset   • Diabetes Mother    • Arthritis Mother    • Anxiety disorder Mother    • Hyperlipidemia Mother    • Liver disease Mother    • Stroke Mother    • Thyroid disease Mother    • Other Father         cardio vascular disease   • Diabetes Father    • Kidney failure Father    • Arthritis Father    • Colon cancer Neg Hx         Social History     Social History Narrative   • Not on file       Objective       Vital Signs:   /94 (BP Location: Left arm, Patient Position: Sitting, Cuff Size: Adult)   Pulse 84   Ht 157.5 cm (62\")   Wt 101 kg (223 lb 3.2 oz)   SpO2 97%   BMI 40.82 kg/m²       Physical Exam  Constitutional:       Appearance: Normal appearance.   HENT:      Head: Normocephalic.   Cardiovascular:      Rate and Rhythm: Normal rate and regular rhythm.      Heart sounds: Normal heart sounds.   Pulmonary:      Effort: Pulmonary effort is normal.      Breath sounds: Normal breath sounds.   Abdominal:      General: Bowel sounds are normal.      Palpations: Abdomen is soft.   Skin:     General: Skin is warm and dry.   Neurological:      Mental Status: She is alert and oriented to person, place, and time. Mental " status is at baseline.   Psychiatric:         Mood and Affect: Mood normal.         Behavior: Behavior normal.         Thought Content: Thought content normal.         Judgment: Judgment normal.         Result Review :       CBC w/diff    CBC w/Diff 1/5/23   WBC 17.65 (A)   RBC 5.33 (A)   Hemoglobin 16.4 (A)   Hematocrit 46.4   MCV 87.1   MCH 30.8   MCHC 35.3   RDW 13.6   Platelets 303   Neutrophil Rel % 71.4   Immature Granulocyte Rel % 0.8 (A)   Lymphocyte Rel % 19.2 (A)   Monocyte Rel % 8.3   Eosinophil Rel % 0.2 (A)   Basophil Rel % 0.1   (A) Abnormal value            CMP    CMP 1/5/23   Glucose 116 (A)   BUN 12   Creatinine 0.62   eGFR 117.1   Sodium 138   Potassium 4.1   Chloride 107   Calcium 9.4   Total Protein 7.5   Albumin 4.4   Globulin 3.1   Total Bilirubin 0.4   Alkaline Phosphatase 94   AST (SGOT) 29   ALT (SGPT) 54 (A)   Albumin/Globulin Ratio 1.4   BUN/Creatinine Ratio 19.4   Anion Gap 12.2   (A) Abnormal value       Comments are available for some flowsheets but are not being displayed.                         Assessment and Plan    Diagnoses and all orders for this visit:    1. History of pancreatitis (Primary)  -     Calcium, Ionized; Future  -     IgG 4; Future  -     Triglycerides; Future  -     MRI abdomen w wo contrast mrcp; Future    2. Elevated liver enzymes  -     AFP Tumor Marker; Future  -     Alpha - 1 - Antitrypsin; Future  -     DENISE; Future  -     Hepatic Function Panel; Future  -     Hepatitis Panel, Acute; Future  -     Iron Profile; Future  -     Ferritin; Future  -     Copper, Serum; Future  -     Protime-INR; Future  -     Ceruloplasmin; Future  -     Mitochondrial Antibodies, M2; Future  -     Anti-Smooth Muscle Antibody Titer; Future  -     Immunofixation, Serum; Future    3. Hepatic steatosis    4. Hepatomegaly    5. Abnormal findings on diagnostic imaging of liver and biliary tract  -     AFP Tumor Marker; Future    6. Abnormal results of liver function studies  -      Hepatitis Panel, Acute; Future  -     Protime-INR; Future    7. Abnormal findings on diagnostic imaging of other parts of digestive tract  -     Triglycerides; Future    8. Generalized abdominal pain    9. Gastroesophageal reflux disease with esophagitis without hemorrhage    10. Diarrhea, unspecified type    Other orders  -     PEG 3350-KCl-NaBcb-NaCl-NaSulf (Golytely) 227.1 g pack; Take 4,000 mL by mouth 1 (One) Time for 1 dose. Take as directed by the office for colon prep  Dispense: 1 each; Refill: 0  -     colestipol (COLESTID) 1 g tablet; Take 1-2 tablets by mouth 2 (Two) Times a Day.  Dispense: 120 tablet; Refill: 1      MRI MRCP ordered-scheduled at [facility] for open MRI due to claustrophobia  Complete liver work-up ordered today.  Trial of Bentyl for management of abdominal pain.  Trial of Colestid prescribed due to diarrhea.  Denies cardiopulmonary history.  EGD/COLONOSCOPY Surgical Risk and Benefits: Possible risk/complications, benefits, and alternatives to surgical or invasive procedure have been explained to patient and/or legal guardian. Risks include bleeding, infection, and perforation. Patient has been evaluated and can tolerate anesthesia and/or sedation. Risk, benefits, and alternatives to anesthesia and sedation have been explained to patient and/or legal guardian.   Patient will follow-up after endoscopy.    Follow Up   No follow-ups on file.  Patient was given instructions and counseling regarding her condition or for health maintenance advice. Please see specific information pulled into the AVS if appropriate.

## 2023-02-23 ENCOUNTER — PREP FOR SURGERY (OUTPATIENT)
Dept: OTHER | Facility: HOSPITAL | Age: 38
End: 2023-02-23
Payer: MEDICARE

## 2023-02-23 ENCOUNTER — OFFICE VISIT (OUTPATIENT)
Dept: GASTROENTEROLOGY | Facility: CLINIC | Age: 38
End: 2023-02-23
Payer: MEDICARE

## 2023-02-23 VITALS
SYSTOLIC BLOOD PRESSURE: 139 MMHG | DIASTOLIC BLOOD PRESSURE: 94 MMHG | OXYGEN SATURATION: 97 % | BODY MASS INDEX: 41.07 KG/M2 | WEIGHT: 223.2 LBS | HEIGHT: 62 IN | HEART RATE: 84 BPM

## 2023-02-23 DIAGNOSIS — R93.2 ABNORMAL FINDINGS ON DIAGNOSTIC IMAGING OF LIVER AND BILIARY TRACT: ICD-10-CM

## 2023-02-23 DIAGNOSIS — K76.0 HEPATIC STEATOSIS: ICD-10-CM

## 2023-02-23 DIAGNOSIS — K21.00 GASTROESOPHAGEAL REFLUX DISEASE WITH ESOPHAGITIS WITHOUT HEMORRHAGE: ICD-10-CM

## 2023-02-23 DIAGNOSIS — R93.3 ABNORMAL FINDINGS ON DIAGNOSTIC IMAGING OF OTHER PARTS OF DIGESTIVE TRACT: ICD-10-CM

## 2023-02-23 DIAGNOSIS — R16.0 HEPATOMEGALY: ICD-10-CM

## 2023-02-23 DIAGNOSIS — R94.5 ABNORMAL RESULTS OF LIVER FUNCTION STUDIES: ICD-10-CM

## 2023-02-23 DIAGNOSIS — R10.84 GENERALIZED ABDOMINAL PAIN: ICD-10-CM

## 2023-02-23 DIAGNOSIS — Z87.19 HISTORY OF PANCREATITIS: Primary | ICD-10-CM

## 2023-02-23 DIAGNOSIS — R74.8 ELEVATED LIVER ENZYMES: ICD-10-CM

## 2023-02-23 DIAGNOSIS — R19.7 DIARRHEA, UNSPECIFIED TYPE: ICD-10-CM

## 2023-02-23 PROCEDURE — 99214 OFFICE O/P EST MOD 30 MIN: CPT

## 2023-02-23 RX ORDER — POLYETHYLENE GLYCOL 3350, SODIUM SULFATE ANHYDROUS, SODIUM BICARBONATE, SODIUM CHLORIDE, POTASSIUM CHLORIDE 227.1; 21.5; 6.36; 5.53; .754 G/L; G/L; G/L; G/L; G/L
4000 POWDER, FOR SOLUTION ORAL ONCE
Qty: 1 EACH | Refills: 0 | Status: SHIPPED | OUTPATIENT
Start: 2023-02-23 | End: 2023-02-23

## 2023-02-23 RX ORDER — OXYCODONE HYDROCHLORIDE 5 MG/1
TABLET ORAL
COMMUNITY
Start: 2022-12-02

## 2023-02-23 RX ORDER — NAPROXEN 500 MG/1
1 TABLET ORAL EVERY 12 HOURS SCHEDULED
COMMUNITY
Start: 2023-01-03

## 2023-02-23 RX ORDER — MONTELUKAST SODIUM 4 MG/1
1-2 TABLET, CHEWABLE ORAL 2 TIMES DAILY
Qty: 120 TABLET | Refills: 1 | Status: SHIPPED | OUTPATIENT
Start: 2023-02-23

## 2023-02-23 RX ORDER — SUCRALFATE 1 G/1
TABLET ORAL
COMMUNITY
Start: 2023-01-31

## 2023-02-23 RX ORDER — OMEPRAZOLE 40 MG/1
1 CAPSULE, DELAYED RELEASE ORAL DAILY
COMMUNITY
Start: 2023-01-13

## 2023-02-23 RX ORDER — CHOLECALCIFEROL (VITAMIN D3) 1250 MCG
1 CAPSULE ORAL WEEKLY
COMMUNITY
Start: 2023-01-03

## 2023-02-23 NOTE — PATIENT INSTRUCTIONS
Colestid - this medication helps manage diarrhea, take 1-2 tablets twice daily. Titrate medication as needed.   Bentyl (dicyclomine) 20mg - this medication treats abdominal pain, take every 6 hours as needed

## 2023-02-27 ENCOUNTER — LAB (OUTPATIENT)
Dept: LAB | Facility: HOSPITAL | Age: 38
End: 2023-02-27
Payer: MEDICARE

## 2023-02-27 DIAGNOSIS — R74.8 ELEVATED LIVER ENZYMES: ICD-10-CM

## 2023-02-27 DIAGNOSIS — R94.5 ABNORMAL RESULTS OF LIVER FUNCTION STUDIES: ICD-10-CM

## 2023-02-27 DIAGNOSIS — R93.2 ABNORMAL FINDINGS ON DIAGNOSTIC IMAGING OF LIVER AND BILIARY TRACT: ICD-10-CM

## 2023-02-27 DIAGNOSIS — Z87.19 HISTORY OF PANCREATITIS: ICD-10-CM

## 2023-02-27 DIAGNOSIS — R93.3 ABNORMAL FINDINGS ON DIAGNOSTIC IMAGING OF OTHER PARTS OF DIGESTIVE TRACT: ICD-10-CM

## 2023-02-27 LAB
ALBUMIN SERPL-MCNC: 4.7 G/DL (ref 3.5–5.2)
ALP SERPL-CCNC: 92 U/L (ref 39–117)
ALPHA-FETOPROTEIN: 4.61 NG/ML (ref 0–8.3)
ALPHA1 GLOB MFR UR ELPH: 125 MG/DL (ref 90–200)
ALT SERPL W P-5'-P-CCNC: 47 U/L (ref 1–33)
AST SERPL-CCNC: 38 U/L (ref 1–32)
BILIRUB CONJ SERPL-MCNC: <0.2 MG/DL (ref 0–0.3)
BILIRUB INDIRECT SERPL-MCNC: ABNORMAL MG/DL
BILIRUB SERPL-MCNC: 0.5 MG/DL (ref 0–1.2)
CERULOPLASMIN SERPL-MCNC: 25 MG/DL (ref 19–39)
FERRITIN SERPL-MCNC: 659 NG/ML (ref 13–150)
HAV IGM SERPL QL IA: NORMAL
HBV CORE IGM SERPL QL IA: NORMAL
HBV SURFACE AG SERPL QL IA: NORMAL
HCV AB SER DONR QL: NORMAL
INR PPP: 0.91 (ref 0.86–1.15)
IRON 24H UR-MRATE: 90 MCG/DL (ref 37–145)
IRON SATN MFR SERPL: 22 % (ref 20–50)
PROT SERPL-MCNC: 7.5 G/DL (ref 6–8.5)
PROTHROMBIN TIME: 12.4 SECONDS (ref 11.8–14.9)
TIBC SERPL-MCNC: 417 MCG/DL (ref 298–536)
TRANSFERRIN SERPL-MCNC: 280 MG/DL (ref 200–360)
TRIGL SERPL-MCNC: 261 MG/DL (ref 0–150)

## 2023-02-27 PROCEDURE — 82525 ASSAY OF COPPER: CPT

## 2023-02-27 PROCEDURE — 86381 MITOCHONDRIAL ANTIBODY EACH: CPT

## 2023-02-27 PROCEDURE — 83540 ASSAY OF IRON: CPT

## 2023-02-27 PROCEDURE — 86015 ACTIN ANTIBODY EACH: CPT

## 2023-02-27 PROCEDURE — 82103 ALPHA-1-ANTITRYPSIN TOTAL: CPT

## 2023-02-27 PROCEDURE — 84466 ASSAY OF TRANSFERRIN: CPT

## 2023-02-27 PROCEDURE — 82105 ALPHA-FETOPROTEIN SERUM: CPT

## 2023-02-27 PROCEDURE — 82330 ASSAY OF CALCIUM: CPT

## 2023-02-27 PROCEDURE — 86038 ANTINUCLEAR ANTIBODIES: CPT

## 2023-02-27 PROCEDURE — 82728 ASSAY OF FERRITIN: CPT

## 2023-02-27 PROCEDURE — 86334 IMMUNOFIX E-PHORESIS SERUM: CPT

## 2023-02-27 PROCEDURE — 80074 ACUTE HEPATITIS PANEL: CPT

## 2023-02-27 PROCEDURE — 36415 COLL VENOUS BLD VENIPUNCTURE: CPT

## 2023-02-27 PROCEDURE — 80076 HEPATIC FUNCTION PANEL: CPT

## 2023-02-27 PROCEDURE — 85610 PROTHROMBIN TIME: CPT

## 2023-02-27 PROCEDURE — 84478 ASSAY OF TRIGLYCERIDES: CPT

## 2023-02-27 PROCEDURE — 86225 DNA ANTIBODY NATIVE: CPT

## 2023-02-27 PROCEDURE — 82784 ASSAY IGA/IGD/IGG/IGM EACH: CPT

## 2023-02-27 PROCEDURE — 82787 IGG 1 2 3 OR 4 EACH: CPT

## 2023-02-27 PROCEDURE — 82390 ASSAY OF CERULOPLASMIN: CPT

## 2023-02-28 LAB
CA-I BLD-MCNC: 5.1 MG/DL (ref 4.6–5.4)
CA-I SERPL ISE-MCNC: 1.28 MMOL/L (ref 1.15–1.35)
DSDNA IGG SERPL IA-ACNC: NEGATIVE [IU]/ML
IGA SERPL-MCNC: 117 MG/DL (ref 87–352)
IGG SERPL-MCNC: 1146 MG/DL (ref 586–1602)
IGG4 SER-MCNC: 27 MG/DL (ref 2–96)
IGM SERPL-MCNC: 132 MG/DL (ref 26–217)
MITOCHONDRIA M2 IGG SER-ACNC: <20 UNITS (ref 0–20)
NUCLEAR IGG SER IA-RTO: NEGATIVE
PROT PATTERN SERPL IFE-IMP: NORMAL
SMA IGG SER-ACNC: 11 UNITS (ref 0–19)

## 2023-03-01 LAB — COPPER SERPL-MCNC: 130 UG/DL (ref 80–158)

## 2023-03-13 DIAGNOSIS — Z87.19 HISTORY OF PANCREATITIS: ICD-10-CM

## 2023-03-14 ENCOUNTER — TELEPHONE (OUTPATIENT)
Dept: GASTROENTEROLOGY | Facility: CLINIC | Age: 38
End: 2023-03-14
Payer: MEDICARE

## 2023-03-14 NOTE — TELEPHONE ENCOUNTER
----- Message from BOSTON Burns sent at 3/14/2023  9:21 AM EDT -----  I have reviewed the patient's MRI MRCP which shows benign chronic fatty infiltration of the pancreas and liver, and liver is moderately enlarged.

## 2023-03-14 NOTE — TELEPHONE ENCOUNTER
Called pt. No answer. Left voicemail for pt to call back.     RE: MRI RESULT --- RESULT NOTES/In Basket

## 2023-05-08 ENCOUNTER — TELEPHONE (OUTPATIENT)
Dept: GASTROENTEROLOGY | Facility: CLINIC | Age: 38
End: 2023-05-08
Payer: MEDICARE

## 2023-05-08 NOTE — TELEPHONE ENCOUNTER
Yuliya with Dr Esparza's office called in requesting blood thinner clearance be faxed to their office. She stated she had received a request to contact us in regards to this but was not sure of the medication the patient is on. She requests the letter be faxed to [phone].

## 2023-05-09 NOTE — TELEPHONE ENCOUNTER
Pt called office, states she is prescribe blood thinner-xarelto by pcp melissa Ford clearance letter

## 2023-06-02 ENCOUNTER — ANESTHESIA EVENT (OUTPATIENT)
Dept: GASTROENTEROLOGY | Facility: HOSPITAL | Age: 38
End: 2023-06-02
Payer: MEDICARE

## 2023-06-02 ENCOUNTER — HOSPITAL ENCOUNTER (OUTPATIENT)
Facility: HOSPITAL | Age: 38
Setting detail: HOSPITAL OUTPATIENT SURGERY
Discharge: HOME | End: 2023-06-02
Attending: INTERNAL MEDICINE | Admitting: INTERNAL MEDICINE
Payer: MEDICARE

## 2023-06-02 ENCOUNTER — ANESTHESIA (OUTPATIENT)
Dept: GASTROENTEROLOGY | Facility: HOSPITAL | Age: 38
End: 2023-06-02
Payer: MEDICARE

## 2023-06-02 VITALS
WEIGHT: 216.93 LBS | OXYGEN SATURATION: 96 % | BODY MASS INDEX: 39.68 KG/M2 | HEART RATE: 89 BPM | RESPIRATION RATE: 20 BRPM | SYSTOLIC BLOOD PRESSURE: 105 MMHG | TEMPERATURE: 97.2 F | DIASTOLIC BLOOD PRESSURE: 69 MMHG

## 2023-06-02 DIAGNOSIS — K21.00 GASTROESOPHAGEAL REFLUX DISEASE WITH ESOPHAGITIS WITHOUT HEMORRHAGE: ICD-10-CM

## 2023-06-02 DIAGNOSIS — R10.84 GENERALIZED ABDOMINAL PAIN: ICD-10-CM

## 2023-06-02 DIAGNOSIS — R19.7 DIARRHEA, UNSPECIFIED TYPE: ICD-10-CM

## 2023-06-02 PROCEDURE — 43239 EGD BIOPSY SINGLE/MULTIPLE: CPT | Performed by: INTERNAL MEDICINE

## 2023-06-02 PROCEDURE — 88305 TISSUE EXAM BY PATHOLOGIST: CPT | Performed by: INTERNAL MEDICINE

## 2023-06-02 PROCEDURE — 45380 COLONOSCOPY AND BIOPSY: CPT | Performed by: INTERNAL MEDICINE

## 2023-06-02 PROCEDURE — 25010000002 PROPOFOL 10 MG/ML EMULSION: Performed by: NURSE ANESTHETIST, CERTIFIED REGISTERED

## 2023-06-02 RX ORDER — LIDOCAINE HYDROCHLORIDE 20 MG/ML
INJECTION, SOLUTION EPIDURAL; INFILTRATION; INTRACAUDAL; PERINEURAL AS NEEDED
Status: DISCONTINUED | OUTPATIENT
Start: 2023-06-02 | End: 2023-06-02 | Stop reason: SURG

## 2023-06-02 RX ORDER — PROPOFOL 10 MG/ML
VIAL (ML) INTRAVENOUS AS NEEDED
Status: DISCONTINUED | OUTPATIENT
Start: 2023-06-02 | End: 2023-06-02 | Stop reason: SURG

## 2023-06-02 RX ORDER — SODIUM CHLORIDE, SODIUM LACTATE, POTASSIUM CHLORIDE, CALCIUM CHLORIDE 600; 310; 30; 20 MG/100ML; MG/100ML; MG/100ML; MG/100ML
30 INJECTION, SOLUTION INTRAVENOUS CONTINUOUS
Status: DISCONTINUED | OUTPATIENT
Start: 2023-06-02 | End: 2023-06-02 | Stop reason: HOSPADM

## 2023-06-02 RX ADMIN — PROPOFOL 70 MG: 10 INJECTION, EMULSION INTRAVENOUS at 10:41

## 2023-06-02 RX ADMIN — PROPOFOL 70 MG: 10 INJECTION, EMULSION INTRAVENOUS at 10:21

## 2023-06-02 RX ADMIN — PROPOFOL 50 MG: 10 INJECTION, EMULSION INTRAVENOUS at 10:50

## 2023-06-02 RX ADMIN — SODIUM CHLORIDE, POTASSIUM CHLORIDE, SODIUM LACTATE AND CALCIUM CHLORIDE: 600; 310; 30; 20 INJECTION, SOLUTION INTRAVENOUS at 10:18

## 2023-06-02 RX ADMIN — LIDOCAINE HYDROCHLORIDE 100 MG: 20 INJECTION, SOLUTION EPIDURAL; INFILTRATION; INTRACAUDAL; PERINEURAL at 10:21

## 2023-06-02 RX ADMIN — PROPOFOL 50 MG: 10 INJECTION, EMULSION INTRAVENOUS at 11:00

## 2023-06-02 RX ADMIN — PROPOFOL 250 MCG/KG/MIN: 10 INJECTION, EMULSION INTRAVENOUS at 10:21

## 2023-06-02 RX ADMIN — PROPOFOL 50 MG: 10 INJECTION, EMULSION INTRAVENOUS at 11:05

## 2023-06-02 NOTE — ANESTHESIA POSTPROCEDURE EVALUATION
Patient: Tram García    Procedure Summary     Date: 06/02/23 Room / Location: [facility] ENDOSCOPY 4 / [facility] ENDOSCOPY    Anesthesia Start: 1018 Anesthesia Stop: 1109    Procedures:       ESOPHAGOGASTRODUODENOSCOPY WITH BIOPSIES      COLONOSCOPY WITH BIOPSIES Diagnosis:       Generalized abdominal pain      Gastroesophageal reflux disease with esophagitis without hemorrhage      Diarrhea, unspecified type      (Generalized abdominal pain [R10.84])      (Gastroesophageal reflux disease with esophagitis without hemorrhage [K21.00])      (Diarrhea, unspecified type [R19.7])    Surgeons: Feliz Olguin MD Provider: Darrell Byrne CRNA    Anesthesia Type: general ASA Status: 3          Anesthesia Type: general    Vitals  Vitals Value Taken Time   /69 06/02/23 1131   Temp 36.2 °C (97.2 °F) 06/02/23 1131   Pulse 89 06/02/23 1131   Resp 20 06/02/23 1131   SpO2 96 % 06/02/23 1131           Post Anesthesia Care and Evaluation    Patient location during evaluation: bedside  Patient participation: complete - patient participated  Level of consciousness: awake  Pain management: adequate    Airway patency: patent  PONV Status: none  Cardiovascular status: acceptable and stable  Respiratory status: acceptable  Hydration status: acceptable    Comments: An Anesthesiologist personally participated in the most demanding procedures (including induction and emergence if applicable) in the anesthesia plan, monitored the course of anesthesia administration at frequent intervals and remained physically present and available for immediate diagnosis and treatment of emergencies.

## 2023-06-02 NOTE — H&P
Pre Procedure History & Physical    Chief Complaint:   Diarrhea heartburn and abdominal    Subjective     HPI:   Heartburn diarrhea and abdominal pain    Past Medical History:   Past Medical History:   Diagnosis Date   • Abnormal ECG    • Acid reflux    • Anemia    • Anxiety 2014   • Asthma    • Bleeding disorder    • Cholelithiasis    • Clotting disorder    • Deep vein thrombosis 2018   • Depression    • Foot sprain, right, initial encounter 10/21/2015   • GERD (gastroesophageal reflux disease)    • HTN (hypertension)     gestational   • Migraine headache    • Pancreatitis 2022    [facility]   • Pineal gland cyst 2014   • Renal cyst 2014       Past Surgical History:  Past Surgical History:   Procedure Laterality Date   •  SECTION     • CHOLECYSTECTOMY     • ENDOSCOPY N/A 10/22/2021    Procedure: ESOPHAGOGASTRODUODENOSCOPY WITH BIOPSIES;  Surgeon: Feliz Olguin MD;  Location: [facility] ENDOSCOPY;  Service: Gastroenterology;  Laterality: N/A;  NORMAL EGD   • GALLBLADDER SURGERY     • LASER ABLATION     • TUBAL ABDOMINAL LIGATION  2012       Family History:  Family History   Problem Relation Age of Onset   • Diabetes Mother    • Arthritis Mother    • Anxiety disorder Mother    • Hyperlipidemia Mother    • Liver disease Mother    • Stroke Mother    • Thyroid disease Mother    • Other Father         cardio vascular disease   • Diabetes Father    • Kidney failure Father    • Arthritis Father    • Colon cancer Neg Hx        Social History:   reports that she quit smoking about 7 months ago. Her smoking use included cigarettes. She has a 15.00 pack-year smoking history. She has never used smokeless tobacco. She reports that she does not drink alcohol and does not use drugs.    Medications:   Medications Prior to Admission   Medication Sig Dispense Refill Last Dose   • amitriptyline (ELAVIL) 50 MG tablet Take 50 mg by mouth Every Night.      •  brompheniramine-pseudoephedrine-DM 30-2-10 MG/5ML syrup Take 10 mL by mouth 3 (Three) Times a Day As Needed for Cough or Congestion. 240 mL 0    • Cholecalciferol (Vitamin D3) 1.25 MG (11279 UT) capsule Take 1 capsule by mouth 1 (One) Time Per Week.      • colestipol (COLESTID) 1 g tablet Take 1-2 tablets by mouth 2 (Two) Times a Day. 120 tablet 1    • famotidine (PEPCID) 40 MG tablet Take 40 mg by mouth Daily.      • fluticasone (FLONASE) 50 MCG/ACT nasal spray 2 sprays into the nostril(s) as directed by provider Daily. 11.1 mL 0    • levETIRAcetam (KEPPRA) 500 MG tablet Take 500 mg by mouth 2 (Two) Times a Day.      • methylPREDNISolone (MEDROL) 4 MG dose pack Take as directed on package instructions. 21 tablet 0    • montelukast (SINGULAIR) 10 MG tablet Take 10 mg by mouth Every Night.      • naproxen (NAPROSYN) 500 MG tablet Take 1 tablet by mouth Every 12 (Twelve) Hours.      • omeprazole (priLOSEC) 40 MG capsule Take 1 capsule by mouth Daily.      • ondansetron ODT (ZOFRAN-ODT) 4 MG disintegrating tablet Place 1 tablet on the tongue Every 8 (Eight) Hours As Needed for Nausea or Vomiting. 10 tablet 0    • oxyCODONE (ROXICODONE) 5 MG immediate release tablet TAKE ONE TABLET BY MOUTH EVERY 4 HOURS AS NEEDED FOR UP TO 2 DAYS      • propranolol (INDERAL) 20 MG tablet Take 20 mg by mouth 2 (Two) Times a Day.      • rivaroxaban (XARELTO) 20 MG tablet Take 20 mg by mouth Daily.      • sertraline (ZOLOFT) 50 MG tablet Take 1 tablet by mouth Daily.      • sucralfate (CARAFATE) 1 g tablet TAKE ONE TABLET BY MOUTH FOUR TIMES DAILY (BEFORE MEALS AND nightly)      • vitamin D3 125 MCG (5000 UT) capsule capsule Take 5,000 Units by mouth Daily.          Allergies:  Sumatriptan, Amoxicillin, and Tylenol [acetaminophen]        Objective     Weight 98.4 kg (216 lb 14.9 oz), not currently breastfeeding.    Physical Exam   Constitutional: Pt is oriented to person, place, and time and well-developed, well-nourished, and in no  distress.   Mouth/Throat: Oropharynx is clear and moist.   Neck: Normal range of motion.   Cardiovascular: Normal rate, regular rhythm and normal heart sounds.    Pulmonary/Chest: Effort normal and breath sounds normal.   Abdominal: Soft. Nontender  Skin: Skin is warm and dry.   Psychiatric: Mood, memory, affect and judgment normal.     Assessment & Plan     Diagnosis:  Heartburn diarrhea and abdominal pain    Anticipated Surgical Procedure:  EGD and colonoscopy    The risks, benefits, and alternatives of this procedure have been discussed with the patient or the responsible party- the patient understands and agrees to proceed.

## 2023-06-02 NOTE — ANESTHESIA PREPROCEDURE EVALUATION
Anesthesia Evaluation     Patient summary reviewed and Nursing notes reviewed   no history of anesthetic complications:  NPO Solid Status: > 8 hours  NPO Liquid Status: > 2 hours           Airway   Mallampati: II  TM distance: >3 FB  Neck ROM: full  No difficulty expected  Dental    (+) poor dentition        Pulmonary - normal exam    breath sounds clear to auscultation  (+) pulmonary embolism (2 yrs ago), a smoker Former, asthma,  Cardiovascular - normal exam  Exercise tolerance: good (4-7 METS)    PT is on anticoagulation therapy  Rhythm: regular  Rate: normal    (+) hypertension, DVT,     ROS comment: Off Xarelto 2 days    Neuro/Psych- negative ROS  GI/Hepatic/Renal/Endo    (+) obesity,       Musculoskeletal     Abdominal    Substance History      OB/GYN          Other        ROS/Med Hx Other: PAT Nursing Notes unavailable.                    Anesthesia Plan    ASA 3     general   total IV anesthesia    Anesthetic plan, risks, benefits, and alternatives have been provided, discussed and informed consent has been obtained with: patient.        CODE STATUS:

## 2023-06-07 ENCOUNTER — TELEPHONE (OUTPATIENT)
Dept: GASTROENTEROLOGY | Facility: CLINIC | Age: 38
End: 2023-06-07
Payer: MEDICARE

## 2023-06-07 NOTE — TELEPHONE ENCOUNTER
-Called patient. No answer. Left vm and call back number ---- Message from BOSTON Elizabeth sent at 6/6/2023  4:47 PM EDT -----  Colonoscopy 6/2/2023: Good bowel prep, normal colonoscopy-random colon biopsies are negative  EGD 6/2/2023: Small hiatal hernia, normal esophagus-esophageal biopsy negative, normal stomach, normal duodenum-duodenal biopsy negative    Schedule follow-up with Thao Valles, nurse practitioner

## 2023-06-07 NOTE — TELEPHONE ENCOUNTER
S/w patient, patient aware of results. Follow up scheduled ----- Message from BOSTON Elizabeth sent at 6/6/2023  4:47 PM EDT -----  Colonoscopy 6/2/2023: Good bowel prep, normal colonoscopy-random colon biopsies are negative  EGD 6/2/2023: Small hiatal hernia, normal esophagus-esophageal biopsy negative, normal stomach, normal duodenum-duodenal biopsy negative    Schedule follow-up with Thao Valles nurse practitioner

## 2023-10-20 ENCOUNTER — OFFICE VISIT (OUTPATIENT)
Dept: GASTROENTEROLOGY | Facility: CLINIC | Age: 38
End: 2023-10-20
Payer: MEDICARE

## 2023-10-20 VITALS
HEART RATE: 69 BPM | WEIGHT: 219 LBS | OXYGEN SATURATION: 96 % | SYSTOLIC BLOOD PRESSURE: 139 MMHG | HEIGHT: 62 IN | DIASTOLIC BLOOD PRESSURE: 81 MMHG | BODY MASS INDEX: 40.3 KG/M2

## 2023-10-20 DIAGNOSIS — R19.7 DIARRHEA, UNSPECIFIED TYPE: ICD-10-CM

## 2023-10-20 DIAGNOSIS — K21.9 GASTROESOPHAGEAL REFLUX DISEASE, UNSPECIFIED WHETHER ESOPHAGITIS PRESENT: ICD-10-CM

## 2023-10-20 DIAGNOSIS — R16.0 HEPATOMEGALY: ICD-10-CM

## 2023-10-20 DIAGNOSIS — R14.0 BLOATING: ICD-10-CM

## 2023-10-20 DIAGNOSIS — Z87.19 HISTORY OF PANCREATITIS: ICD-10-CM

## 2023-10-20 DIAGNOSIS — R74.8 ELEVATED LIVER ENZYMES: ICD-10-CM

## 2023-10-20 DIAGNOSIS — E66.01 CLASS 3 SEVERE OBESITY WITH BODY MASS INDEX (BMI) OF 40.0 TO 44.9 IN ADULT, UNSPECIFIED OBESITY TYPE, UNSPECIFIED WHETHER SERIOUS COMORBIDITY PRESENT: ICD-10-CM

## 2023-10-20 DIAGNOSIS — K76.0 FATTY LIVER: Primary | ICD-10-CM

## 2023-10-20 RX ORDER — AMITRIPTYLINE HYDROCHLORIDE 25 MG/1
TABLET, FILM COATED ORAL
COMMUNITY
Start: 2023-09-15

## 2023-10-20 RX ORDER — PANCRELIPASE 36000; 180000; 114000 [USP'U]/1; [USP'U]/1; [USP'U]/1
36000 CAPSULE, DELAYED RELEASE PELLETS ORAL TAKE AS DIRECTED
Qty: 240 CAPSULE | Refills: 5 | Status: SHIPPED | OUTPATIENT
Start: 2023-10-20

## 2023-10-20 RX ORDER — TRAMADOL HYDROCHLORIDE 50 MG/1
TABLET ORAL
COMMUNITY
Start: 2023-07-28

## 2023-10-20 RX ORDER — PROPRANOLOL HYDROCHLORIDE 60 MG/1
TABLET ORAL
COMMUNITY
Start: 2023-09-15

## 2023-10-20 NOTE — PATIENT INSTRUCTIONS
Low-FODMAP Eating Plan    FODMAP stands for fermentable oligosaccharides, disaccharides, monosaccharides, and polyols. These are sugars that are hard for some people to digest. A low-FODMAP eating plan may help some people who have irritable bowel syndrome (IBS) and certain other bowel (intestinal) diseases to manage their symptoms.  This meal plan can be complicated to follow. Work with a diet and nutrition specialist (dietitian) to make a low-FODMAP eating plan that is right for you. A dietitian can help make sure that you get enough nutrition from this diet.  What are tips for following this plan?  Reading food labels  Check labels for hidden FODMAPs such as:  High-fructose syrup.  Honey.  Agave.  Natural fruit flavors.  Onion or garlic powder.  Choose low-FODMAP foods that contain 3-4 grams of fiber per serving.  Check food labels for serving sizes. Eat only one serving at a time to make sure FODMAP levels stay low.  Shopping  Shop with a list of foods that are recommended on this diet and make a meal plan.  Meal planning  Follow a low-FODMAP eating plan for up to 6 weeks, or as told by your health care provider or dietitian.  To follow the eating plan:  Eliminate high-FODMAP foods from your diet completely. Choose only low-FODMAP foods to eat. You will do this for 2-6 weeks.  Gradually reintroduce high-FODMAP foods into your diet one at a time. Most people should wait a few days before introducing the next new high-FODMAP food into their meal plan. Your dietitian can recommend how quickly you may reintroduce foods.  Keep a daily record of what and how much you eat and drink. Make note of any symptoms that you have after eating.  Review your daily record with a dietitian regularly to identify which foods you can eat and which foods you should avoid.  General tips  Drink enough fluid each day to keep your urine pale yellow.  Avoid processed foods. These often have added sugar and may be high in FODMAPs.  Avoid  "most dairy products, whole grains, and sweeteners.  Work with a dietitian to make sure you get enough fiber in your diet.  Avoid high FODMAP foods at meals to manage symptoms.  Recommended foods  Fruits  Bananas, oranges, tangerines, cha, limes, blueberries, raspberries, strawberries, grapes, cantaloupe, honeydew melon, kiwi, papaya, passion fruit, and pineapple. Limited amounts of dried cranberries, banana chips, and shredded coconut.  Vegetables  Eggplant, zucchini, cucumber, peppers, green beans, bean sprouts, lettuce, arugula, kale, Swiss chard, spinach, sybil greens, bok rocky, summer squash, potato, and tomato. Limited amounts of corn, carrot, and sweet potato. Green parts of scallions.  Grains  Gluten-free grains, such as rice, oats, buckwheat, quinoa, corn, polenta, and millet. Gluten-free pasta, bread, or cereal. Rice noodles. Corn tortillas.  Meats and other proteins  Unseasoned beef, pork, poultry, or fish. Eggs. Bar. Tofu (firm) and tempeh. Limited amounts of nuts and seeds, such as almonds, walnuts, brazil nuts, pecans, peanuts, nut butters, pumpkin seeds, juancho seeds, and sunflower seeds.  Dairy  Lactose-free milk, yogurt, and kefir. Lactose-free cottage cheese and ice cream. Non-dairy milks, such as almond, coconut, hemp, and rice milk. Non-dairy yogurt. Limited amounts of goat cheese, brie, mozzarella, parmesan, swiss, and other hard cheeses.  Fats and oils  Butter-free spreads. Vegetable oils, such as olive, canola, and sunflower oil.  Seasoning and other foods  Artificial sweeteners with names that do not end in \"ol,\" such as aspartame, saccharine, and stevia. Maple syrup, white table sugar, raw sugar, brown sugar, and molasses. Mayonnaise, soy sauce, and tamari. Fresh basil, coriander, parsley, rosemary, and thyme.  Beverages  Water and mineral water. Sugar-sweetened soft drinks. Small amounts of orange juice or cranberry juice. Black and green tea. Most dry dinesh. Coffee.  The items listed " above may not be a complete list of foods and beverages you can eat. Contact a dietitian for more information.  Foods to avoid  Fruits  Fresh, dried, and juiced forms of apple, pear, watermelon, peach, plum, cherries, apricots, blackberries, boysenberries, figs, nectarines, and evelyn. Avocado.  Vegetables  Chicory root, artichoke, asparagus, cabbage, snow peas, [place] sprouts, broccoli, sugar snap peas, mushrooms, celery, and cauliflower. Onions, garlic, leeks, and the white part of scallions.  Grains  Wheat, including kamut, durum, and semolina. Barley and bulgur. Couscous. Wheat-based cereals. Wheat noodles, bread, crackers, and pastries.  Meats and other proteins  Fried or fatty meat. Sausage. Cashews and pistachios. Soybeans, baked beans, black beans, chickpeas, kidney beans, melanie beans, navy beans, lentils, black-eyed peas, and split peas.  Dairy  Milk, yogurt, ice cream, and soft cheese. Cream and sour cream. Milk-based sauces. Custard. Buttermilk. Soy milk.  Seasoning and other foods  Any sugar-free gum or candy. Foods that contain artificial sweeteners such as sorbitol, mannitol, isomalt, or xylitol. Foods that contain honey, high-fructose corn syrup, or agave. Bouillon, vegetable stock, beef stock, and chicken stock. Garlic and onion powder. Condiments made with onion, such as hummus, chutney, pickles, relish, salad dressing, and salsa. Tomato paste.  Beverages  Chicory-based drinks. Coffee substitutes. Chamomile tea. Fennel tea. Sweet or fortified dinesh such as port or didi. Diet soft drinks made with isomalt, mannitol, maltitol, sorbitol, or xylitol. Apple, pear, and evelyn juice. Juices with high-fructose corn syrup.  The items listed above may not be a complete list of foods and beverages you should avoid. Contact a dietitian for more information.  Summary  FODMAP stands for fermentable oligosaccharides, disaccharides, monosaccharides, and polyols. These are sugars that are hard for some people to  digest.  A low-FODMAP eating plan is a short-term diet that helps to ease symptoms of certain bowel diseases.  The eating plan usually lasts up to 6 weeks. After that, high-FODMAP foods are reintroduced gradually and one at a time. This can help you find out which foods may be causing symptoms.  A low-FODMAP eating plan can be complicated. It is best to work with a dietitian who has experience with this type of plan.  This information is not intended to replace advice given to you by your health care provider. Make sure you discuss any questions you have with your health care provider.  Document Revised: 05/06/2021 Document Reviewed: 05/06/2021  Elsevier Patient Education © 2023 [employer]

## 2023-10-20 NOTE — PROGRESS NOTES
"Chief Complaint  Follow-up (Results of scope/ can't eat a lot due to feeling full after a few bits/ feeling of bloated ness when eating/ upper stomach hard when stands up)    Tram García is a 37 y.o. female who presents to [facility] for follow up    History of present Illness  Established care 9/2021 for \"knots\" in her abdomen, abdominal pain with meals, heartburn, and fatty liver.  Stool studies 9/20/2021-negative C. difficile, lactoferrin, parasite, and bacteria  EGD 10/22/2021 by Dr. Olguin -normal esophagus, stomach, and duodenum.  Biopsies consistent with reflux esophagitis.    Last office visit 2/23/23 - continues to have periumbilical pain that radiates to her left and right side.  Pain is persistent, not necessarily precipitated by eating.  Abdomen is tender to touch.  Heartburn well controlled with Prilosec 40 mg daily.   3-4 loose bowel movements/daily. Denies alcohol use. History of pancreatitis 11/29/22 requiring hospitalization.   MRI MRCP 3/10/23 - chronic benign fatty pancreas with sparing in the pancreatic head (unchanged), cholecystectomy, moderate hepatomegaly and diffuse hepatic steatosis  Pancreatitis labs 2/27/23 - triglycerides 261, IgG4 27 (normal), ionized calcium normal  Liver work up 2/27/23 - normal   ASMA, AMA, DENISE - negative, immunofixation normal  Alpha-1 antitrypsin 125  Ceruloplasmin 25, copper 130  Iron 90, iron saturation 22, TIBC 417, ferritin 659  Acute hepatitis panel-negative  EGD/colonoscopy 6/2/2023 by Dr. Olguin-normal esophagus, small hiatal hernia, normal stomach and duodenum.  Esophageal and duodenum biopsies normal.  Normal mucosa throughout colon.  Random colon biopsies negative.    Patient presents to the office for follow up. She has had a 4 pound weight loss since last office visit.  Heartburn indigestion symptoms are well controlled with Protonix 40 mg daily.  Denies nausea, vomiting, epigastric pain, and dysphagia.  " Continues to struggle with loose stools about 3-4 times a day and bloating.  Denies abdominal pain, melena, and hematochezia.      Past Medical History:   Diagnosis Date    Abnormal ECG     Acid reflux     Anemia     Anxiety 2014    Asthma     Bleeding disorder     Cholelithiasis     Clotting disorder     Deep vein thrombosis 2018    Depression     Foot sprain, right, initial encounter 10/21/2015    GERD (gastroesophageal reflux disease)     HTN (hypertension)     gestational    Migraine headache     Pancreatitis 2022    [facility]    Pineal gland cyst 2014    Renal cyst 2014       Past Surgical History:   Procedure Laterality Date     SECTION      CHOLECYSTECTOMY      COLONOSCOPY N/A 2023    Procedure: COLONOSCOPY WITH BIOPSIES;  Surgeon: Feliz Olguin MD;  Location: [facility] ENDOSCOPY;  Service: Gastroenterology;  Laterality: N/A;  NORMAL COLONOSCOPY    ENDOSCOPY N/A 10/22/2021    Procedure: ESOPHAGOGASTRODUODENOSCOPY WITH BIOPSIES;  Surgeon: Feliz Olguin MD;  Location: [facility] ENDOSCOPY;  Service: Gastroenterology;  Laterality: N/A;  NORMAL EGD    ENDOSCOPY N/A 2023    Procedure: ESOPHAGOGASTRODUODENOSCOPY WITH BIOPSIES;  Surgeon: Feliz Olguin MD;  Location: [facility] ENDOSCOPY;  Service: Gastroenterology;  Laterality: N/A;  HIATAL HERNIA    LASER ABLATION      TUBAL ABDOMINAL LIGATION  2012         Current Outpatient Medications:     amitriptyline (ELAVIL) 25 MG tablet, , Disp: , Rfl:     Cholecalciferol (Vitamin D3) 1.25 MG (06662 UT) capsule, Take 1 capsule by mouth 1 (One) Time Per Week., Disp: , Rfl:     levETIRAcetam (KEPPRA) 500 MG tablet, Take 1 tablet by mouth 2 (Two) Times a Day., Disp: , Rfl:     montelukast (SINGULAIR) 10 MG tablet, Take 1 tablet by mouth Every Night., Disp: , Rfl:     naproxen (NAPROSYN) 500 MG tablet, Take 1 tablet by mouth Every 12 (Twelve) Hours., Disp: , Rfl:     ondansetron ODT (ZOFRAN-ODT) 4 MG  disintegrating tablet, Place 1 tablet on the tongue Every 8 (Eight) Hours As Needed for Nausea or Vomiting., Disp: 10 tablet, Rfl: 0    oxyCODONE (ROXICODONE) 5 MG immediate release tablet, TAKE ONE TABLET BY MOUTH EVERY 4 HOURS AS NEEDED FOR UP TO 2 DAYS, Disp: , Rfl:     pantoprazole (PROTONIX) 40 MG EC tablet, Take 1 tablet by mouth., Disp: , Rfl:     propranolol (INDERAL) 60 MG tablet, , Disp: , Rfl:     rivaroxaban (XARELTO) 20 MG tablet, Take 1 tablet by mouth Daily., Disp: , Rfl:     sertraline (ZOLOFT) 50 MG tablet, Take 1 tablet by mouth Daily., Disp: , Rfl:     sucralfate (CARAFATE) 1 g tablet, TAKE ONE TABLET BY MOUTH FOUR TIMES DAILY (BEFORE MEALS AND nightly), Disp: , Rfl:     traMADol (ULTRAM) 50 MG tablet, TAKE ONE TABLET BY MOUTH EVERY 6 HOURS AS NEEDED FOR PAIN FOR UP TO 12 doses, Disp: , Rfl:     vitamin D3 125 MCG (5000 UT) capsule capsule, Take 1 capsule by mouth Daily., Disp: , Rfl:     brompheniramine-pseudoephedrine-DM 30-2-10 MG/5ML syrup, Take 10 mL by mouth 3 (Three) Times a Day As Needed for Cough or Congestion. (Patient not taking: Reported on 10/20/2023), Disp: 240 mL, Rfl: 0    colestipol (COLESTID) 1 g tablet, Take 1-2 tablets by mouth 2 (Two) Times a Day. (Patient not taking: Reported on 10/20/2023), Disp: 120 tablet, Rfl: 1    fluticasone (FLONASE) 50 MCG/ACT nasal spray, 2 sprays into the nostril(s) as directed by provider Daily. (Patient not taking: Reported on 10/20/2023), Disp: 11.1 mL, Rfl: 0    methylPREDNISolone (MEDROL) 4 MG dose pack, Take as directed on package instructions. (Patient not taking: Reported on 10/20/2023), Disp: 21 tablet, Rfl: 0    Pancrelipase, Lip-Prot-Amyl, (Creon) 12634-380125 units capsule delayed-release particles capsule, Take 1 capsule by mouth Take As Directed. Take 2 capsule with every meal and 1 capsule with snacks, Disp: 240 capsule, Rfl: 5     Allergies   Allergen Reactions    Sumatriptan Shortness Of Breath    Amoxicillin Hives     Hives and  "blisters    Tylenol [Acetaminophen] Hives       Family History   Problem Relation Age of Onset    Diabetes Mother     Arthritis Mother     Anxiety disorder Mother     Hyperlipidemia Mother     Liver disease Mother     Stroke Mother     Thyroid disease Mother     Other Father         cardio vascular disease    Diabetes Father     Kidney failure Father     Arthritis Father     Colon cancer Neg Hx         Social History     Social History Narrative    Not on file       Objective       Vital Signs:   /81 (BP Location: Right arm, Patient Position: Sitting, Cuff Size: Large Adult)   Pulse 69   Ht 157.5 cm (62\")   Wt 99.3 kg (219 lb)   SpO2 96%   BMI 40.06 kg/m²       Physical Exam  Constitutional:       Appearance: Normal appearance.   HENT:      Head: Normocephalic.   Cardiovascular:      Rate and Rhythm: Normal rate and regular rhythm.      Heart sounds: Normal heart sounds.   Pulmonary:      Effort: Pulmonary effort is normal.      Breath sounds: Normal breath sounds.   Abdominal:      General: Bowel sounds are normal.      Palpations: Abdomen is soft.   Skin:     General: Skin is warm and dry.   Neurological:      Mental Status: She is alert and oriented to person, place, and time. Mental status is at baseline.   Psychiatric:         Mood and Affect: Mood normal.         Behavior: Behavior normal.         Thought Content: Thought content normal.         Judgment: Judgment normal.         Result Review :       CBC w/diff          1/5/2023    22:19   CBC w/Diff   WBC 17.65    RBC 5.33    Hemoglobin 16.4    Hematocrit 46.4    MCV 87.1    MCH 30.8    MCHC 35.3    RDW 13.6    Platelets 303    Neutrophil Rel % 71.4    Immature Granulocyte Rel % 0.8    Lymphocyte Rel % 19.2    Monocyte Rel % 8.3    Eosinophil Rel % 0.2    Basophil Rel % 0.1      CMP          1/5/2023    22:19 2/27/2023    15:16   CMP   Glucose 116     BUN 12     Creatinine 0.62     EGFR 117.1     Sodium 138     Potassium 4.1     Chloride 107   "   Calcium 9.4     Total Protein 7.5  7.5    Albumin 4.4  4.7    Globulin 3.1     Total Bilirubin 0.4  0.5    Alkaline Phosphatase 94  92    AST (SGOT) 29  38    ALT (SGPT) 54  47    Albumin/Globulin Ratio 1.4     BUN/Creatinine Ratio 19.4     Anion Gap 12.2         Liver Workup   ALPHA -1 ANTITRYPSIN   Date Value Ref Range Status   02/27/2023 125 90 - 200 mg/dL Final     dsDNA   Date Value Ref Range Status   02/27/2023 Negative Negative Final     Expanded LUIS Screen   Date Value Ref Range Status   02/27/2023 Negative Negative Final     Smooth Muscle Ab   Date Value Ref Range Status   02/27/2023 11 0 - 19 Units Final     Comment:                      Negative                     0 - 19                   Weak positive               20 - 30                   Moderate to strong positive     >30   Actin Antibodies are found in 52-85% of patients with   autoimmune hepatitis or chronic active hepatitis and   in 22% of patients with primary biliary cirrhosis.     Ceruloplasmin   Date Value Ref Range Status   02/27/2023 25 19 - 39 mg/dL Final     Ferritin   Date Value Ref Range Status   02/27/2023 659.00 (H) 13.00 - 150.00 ng/mL Final     Immunofixation Result, Serum   Date Value Ref Range Status   02/27/2023 Comment  Final     Comment:     No monoclonality detected.     IgG   Date Value Ref Range Status   02/27/2023 1146 586 - 1602 mg/dL Final     IgA   Date Value Ref Range Status   02/27/2023 117 87 - 352 mg/dL Final     IgM   Date Value Ref Range Status   02/27/2023 132 26 - 217 mg/dL Final     Iron   Date Value Ref Range Status   02/27/2023 90 37 - 145 mcg/dL Final     TIBC   Date Value Ref Range Status   02/27/2023 417 298 - 536 mcg/dL Final     Iron Saturation (TSAT)   Date Value Ref Range Status   02/27/2023 22 20 - 50 % Final     Transferrin   Date Value Ref Range Status   02/27/2023 280 200 - 360 mg/dL Final     Mitochondrial Ab   Date Value Ref Range Status   02/27/2023 <20.0 0.0 - 20.0 Units Final     Comment:                                      Negative    0.0 - 20.0                                  Equivocal  20.1 - 24.9                                  Positive         >24.9  Mitochondrial (M2) Antibodies are found in 90-96% of  patients with primary biliary cirrhosis.     Protime   Date Value Ref Range Status   02/27/2023 12.4 11.8 - 14.9 Seconds Final     INR   Date Value Ref Range Status   02/27/2023 0.91 0.86 - 1.15 Final     ALPHA-FETOPROTEIN   Date Value Ref Range Status   02/27/2023 4.61 0 - 8.3 ng/mL Final           Assessment and Plan    Diagnoses and all orders for this visit:    1. Fatty liver (Primary)  -     Liver Elastography; Future    2. Hepatomegaly  -     Liver Elastography; Future    3. Elevated liver enzymes  -     Liver Elastography; Future    4. Class 3 severe obesity with body mass index (BMI) of 40.0 to 44.9 in adult, unspecified obesity type, unspecified whether serious comorbidity present  -     Liver Elastography; Future    5. Gastroesophageal reflux disease, unspecified whether esophagitis present    6. Bloating    7. Diarrhea, unspecified type    8. History of pancreatitis    Other orders  -     Pancrelipase, Lip-Prot-Amyl, (Creon) [id number] units capsule delayed-release particles capsule; Take 1 capsule by mouth Take As Directed. Take 2 capsule with every meal and 1 capsule with snacks  Dispense: 240 capsule; Refill: 5    Reviewed most recent liver work-up and MRI MRCP with the patient  Reviewed most recent EGD and colonoscopy with the patient.  Discussed fatty liver and strongly advised patient to maintain a healthy lifestyle: weight loss of 10%, cutting back on carbs, sugars, and fried fatty foods, limiting intake of soda and sugary drinks, and abstain from alcohol.   Due to history of pancreatitis, persistent bloating and loose stools as well as most recent MRI showing fatty pancreas plan to proceed with trial of Creon.  Patient will take 2 capsules with every meal and 1 capsule with  every snack.  Patient will make follow-up appointment with PCP to assess hemoglobin A1C  Proceed with FibroScan for further evaluation.    Follow Up   Return for FibroScan results.  Patient was given instructions and counseling regarding her condition or for health maintenance advice. Please see specific information pulled into the AVS if appropriate.

## 2024-02-01 NOTE — PRE-PROCEDURE NOTE
Instructed on arrival time.  Will need  over the age of 18 to drive them home.  Do not take any medications the day of the procedure, but bring them with you.  Discussed diet and bowel prep.  Come to entrance C.  Instructed to call if any questions or concerns.   no

## 2024-02-14 ENCOUNTER — TELEPHONE (OUTPATIENT)
Dept: OTHER | Facility: HOSPITAL | Age: 39
End: 2024-02-14
Payer: MEDICARE

## 2024-02-16 ENCOUNTER — PROCEDURE VISIT (OUTPATIENT)
Dept: OTHER | Facility: HOSPITAL | Age: 39
End: 2024-02-16
Payer: MEDICARE

## 2024-02-16 DIAGNOSIS — E66.01 CLASS 3 SEVERE OBESITY WITH BODY MASS INDEX (BMI) OF 40.0 TO 44.9 IN ADULT, UNSPECIFIED OBESITY TYPE, UNSPECIFIED WHETHER SERIOUS COMORBIDITY PRESENT: ICD-10-CM

## 2024-02-16 DIAGNOSIS — R74.8 ELEVATED LIVER ENZYMES: ICD-10-CM

## 2024-02-16 DIAGNOSIS — K76.0 FATTY LIVER: ICD-10-CM

## 2024-02-16 DIAGNOSIS — R16.0 HEPATOMEGALY: ICD-10-CM

## 2024-02-16 PROCEDURE — 91200 LIVER ELASTOGRAPHY: CPT | Performed by: NURSE PRACTITIONER

## 2024-02-29 ENCOUNTER — TELEPHONE (OUTPATIENT)
Dept: GASTROENTEROLOGY | Facility: CLINIC | Age: 39
End: 2024-02-29

## 2024-02-29 NOTE — TELEPHONE ENCOUNTER
----- Message from Tram García sent at 2/16/2024  9:55 AM EST -----  Regarding: Appointment canceled  Contact: [phone]  Appointment canceled for Damonpablo STANFORD García ([id number])  Visit Type: FOLLOW UP  Date        Time      Length    Provider                  Department  2/29/2024   11:00 AM  15 mins.  Thao Valles           [facility]    Reason for Cancellation: Other    Patient Comments: I won't be able to make that appointment I need to reschedule it another day after three aclock

## 2024-02-29 NOTE — TELEPHONE ENCOUNTER
Attempted to contact patient to see if they wanted to reschedule at this time. [facility]

## 2024-03-08 ENCOUNTER — APPOINTMENT (OUTPATIENT)
Dept: GENERAL RADIOLOGY | Facility: HOSPITAL | Age: 39
DRG: 439 | End: 2024-03-08
Payer: MEDICARE

## 2024-03-08 ENCOUNTER — HOSPITAL ENCOUNTER (INPATIENT)
Facility: HOSPITAL | Age: 39
LOS: 4 days | Discharge: HOME | DRG: 439 | End: 2024-03-12
Attending: FAMILY MEDICINE | Admitting: FAMILY MEDICINE
Payer: MEDICARE

## 2024-03-08 PROBLEM — K85.90 PANCREATITIS: Status: ACTIVE | Noted: 2024-03-08

## 2024-03-08 PROBLEM — Z86.718 HISTORY OF DVT (DEEP VEIN THROMBOSIS): Status: ACTIVE | Noted: 2024-03-08

## 2024-03-08 LAB
ALBUMIN SERPL-MCNC: 4 G/DL (ref 3.5–5.2)
ALBUMIN/GLOB SERPL: 1.5 G/DL
ALP SERPL-CCNC: 80 U/L (ref 39–117)
ALT SERPL W P-5'-P-CCNC: 79 U/L (ref 1–33)
ANION GAP SERPL CALCULATED.3IONS-SCNC: 12 MMOL/L (ref 5–15)
AST SERPL-CCNC: 61 U/L (ref 1–32)
BASOPHILS # BLD AUTO: 0.08 10*3/MM3 (ref 0–0.2)
BASOPHILS NFR BLD AUTO: 0.8 % (ref 0–1.5)
BILIRUB SERPL-MCNC: 0.6 MG/DL (ref 0–1.2)
BUN SERPL-MCNC: 11 MG/DL (ref 6–20)
BUN/CREAT SERPL: 17.7 (ref 7–25)
CALCIUM SPEC-SCNC: 8.9 MG/DL (ref 8.6–10.5)
CHLORIDE SERPL-SCNC: 107 MMOL/L (ref 98–107)
CO2 SERPL-SCNC: 19 MMOL/L (ref 22–29)
CREAT SERPL-MCNC: 0.62 MG/DL (ref 0.57–1)
D-LACTATE SERPL-SCNC: 1.4 MMOL/L (ref 0.5–2)
DEPRECATED RDW RBC AUTO: 45 FL (ref 37–54)
EGFRCR SERPLBLD CKD-EPI 2021: 116.3 ML/MIN/1.73
EOSINOPHIL # BLD AUTO: 0.53 10*3/MM3 (ref 0–0.4)
EOSINOPHIL NFR BLD AUTO: 5.4 % (ref 0.3–6.2)
ERYTHROCYTE [DISTWIDTH] IN BLOOD BY AUTOMATED COUNT: 13.9 % (ref 12.3–15.4)
GLOBULIN UR ELPH-MCNC: 2.6 GM/DL
GLUCOSE SERPL-MCNC: 104 MG/DL (ref 65–99)
HCT VFR BLD AUTO: 47.3 % (ref 34–46.6)
HGB BLD-MCNC: 15.7 G/DL (ref 12–15.9)
IMM GRANULOCYTES # BLD AUTO: 0.04 10*3/MM3 (ref 0–0.05)
IMM GRANULOCYTES NFR BLD AUTO: 0.4 % (ref 0–0.5)
LIPASE SERPL-CCNC: 27 U/L (ref 13–60)
LYMPHOCYTES # BLD AUTO: 3.98 10*3/MM3 (ref 0.7–3.1)
LYMPHOCYTES NFR BLD AUTO: 40.4 % (ref 19.6–45.3)
MCH RBC QN AUTO: 29.6 PG (ref 26.6–33)
MCHC RBC AUTO-ENTMCNC: 33.2 G/DL (ref 31.5–35.7)
MCV RBC AUTO: 89.2 FL (ref 79–97)
MONOCYTES # BLD AUTO: 0.87 10*3/MM3 (ref 0.1–0.9)
MONOCYTES NFR BLD AUTO: 8.8 % (ref 5–12)
NEUTROPHILS NFR BLD AUTO: 4.36 10*3/MM3 (ref 1.7–7)
NEUTROPHILS NFR BLD AUTO: 44.2 % (ref 42.7–76)
NRBC BLD AUTO-RTO: 0 /100 WBC (ref 0–0.2)
PLATELET # BLD AUTO: 213 10*3/MM3 (ref 140–450)
PMV BLD AUTO: 10.2 FL (ref 6–12)
POTASSIUM SERPL-SCNC: 4 MMOL/L (ref 3.5–5.2)
PROT SERPL-MCNC: 6.6 G/DL (ref 6–8.5)
RBC # BLD AUTO: 5.3 10*6/MM3 (ref 3.77–5.28)
SODIUM SERPL-SCNC: 138 MMOL/L (ref 136–145)
WBC NRBC COR # BLD AUTO: 9.86 10*3/MM3 (ref 3.4–10.8)

## 2024-03-08 PROCEDURE — 25010000002 KETOROLAC TROMETHAMINE PER 15 MG: Performed by: FAMILY MEDICINE

## 2024-03-08 PROCEDURE — 71045 X-RAY EXAM CHEST 1 VIEW: CPT

## 2024-03-08 PROCEDURE — 25810000003 SODIUM CHLORIDE 0.9 % SOLUTION: Performed by: FAMILY MEDICINE

## 2024-03-08 PROCEDURE — 25010000002 MORPHINE PER 10 MG: Performed by: FAMILY MEDICINE

## 2024-03-08 PROCEDURE — 99223 1ST HOSP IP/OBS HIGH 75: CPT | Performed by: FAMILY MEDICINE

## 2024-03-08 PROCEDURE — 25810000003 SODIUM CHLORIDE 0.9 % SOLUTION: Performed by: INTERNAL MEDICINE

## 2024-03-08 PROCEDURE — 85025 COMPLETE CBC W/AUTO DIFF WBC: CPT | Performed by: FAMILY MEDICINE

## 2024-03-08 PROCEDURE — 80053 COMPREHEN METABOLIC PANEL: CPT | Performed by: FAMILY MEDICINE

## 2024-03-08 PROCEDURE — 83605 ASSAY OF LACTIC ACID: CPT | Performed by: FAMILY MEDICINE

## 2024-03-08 PROCEDURE — 25010000002 ONDANSETRON PER 1 MG: Performed by: FAMILY MEDICINE

## 2024-03-08 PROCEDURE — 83690 ASSAY OF LIPASE: CPT | Performed by: FAMILY MEDICINE

## 2024-03-08 RX ORDER — KETOROLAC TROMETHAMINE 30 MG/ML
15 INJECTION, SOLUTION INTRAMUSCULAR; INTRAVENOUS EVERY 6 HOURS PRN
Status: DISPENSED | OUTPATIENT
Start: 2024-03-08 | End: 2024-03-11

## 2024-03-08 RX ORDER — ONDANSETRON 2 MG/ML
4 INJECTION INTRAMUSCULAR; INTRAVENOUS EVERY 6 HOURS PRN
Status: DISCONTINUED | OUTPATIENT
Start: 2024-03-08 | End: 2024-03-12 | Stop reason: HOSPADM

## 2024-03-08 RX ORDER — AMOXICILLIN 250 MG
2 CAPSULE ORAL 2 TIMES DAILY PRN
Status: DISCONTINUED | OUTPATIENT
Start: 2024-03-08 | End: 2024-03-12 | Stop reason: HOSPADM

## 2024-03-08 RX ORDER — MONTELUKAST SODIUM 10 MG/1
10 TABLET ORAL NIGHTLY
Status: DISCONTINUED | OUTPATIENT
Start: 2024-03-08 | End: 2024-03-12 | Stop reason: HOSPADM

## 2024-03-08 RX ORDER — SERTRALINE HYDROCHLORIDE 25 MG/1
50 TABLET, FILM COATED ORAL DAILY
Status: DISCONTINUED | OUTPATIENT
Start: 2024-03-08 | End: 2024-03-12 | Stop reason: HOSPADM

## 2024-03-08 RX ORDER — POLYETHYLENE GLYCOL 3350 17 G/17G
17 POWDER, FOR SOLUTION ORAL DAILY PRN
Status: DISCONTINUED | OUTPATIENT
Start: 2024-03-08 | End: 2024-03-12 | Stop reason: HOSPADM

## 2024-03-08 RX ORDER — SODIUM CHLORIDE 0.9 % (FLUSH) 0.9 %
10 SYRINGE (ML) INJECTION EVERY 12 HOURS SCHEDULED
Status: DISCONTINUED | OUTPATIENT
Start: 2024-03-08 | End: 2024-03-12 | Stop reason: HOSPADM

## 2024-03-08 RX ORDER — HEPARIN SODIUM 5000 [USP'U]/ML
5000 INJECTION, SOLUTION INTRAVENOUS; SUBCUTANEOUS EVERY 12 HOURS SCHEDULED
Status: DISCONTINUED | OUTPATIENT
Start: 2024-03-08 | End: 2024-03-08

## 2024-03-08 RX ORDER — SODIUM CHLORIDE 9 MG/ML
40 INJECTION, SOLUTION INTRAVENOUS AS NEEDED
Status: DISCONTINUED | OUTPATIENT
Start: 2024-03-08 | End: 2024-03-12 | Stop reason: HOSPADM

## 2024-03-08 RX ORDER — ATORVASTATIN CALCIUM 20 MG/1
20 TABLET, FILM COATED ORAL DAILY
Status: DISCONTINUED | OUTPATIENT
Start: 2024-03-08 | End: 2024-03-12 | Stop reason: HOSPADM

## 2024-03-08 RX ORDER — LEVETIRACETAM 500 MG/1
500 TABLET ORAL 2 TIMES DAILY
Status: DISCONTINUED | OUTPATIENT
Start: 2024-03-08 | End: 2024-03-12 | Stop reason: HOSPADM

## 2024-03-08 RX ORDER — BISACODYL 5 MG/1
5 TABLET, DELAYED RELEASE ORAL DAILY PRN
Status: DISCONTINUED | OUTPATIENT
Start: 2024-03-08 | End: 2024-03-12 | Stop reason: HOSPADM

## 2024-03-08 RX ORDER — NALOXONE HCL 0.4 MG/ML
0.4 VIAL (ML) INJECTION
Status: DISCONTINUED | OUTPATIENT
Start: 2024-03-08 | End: 2024-03-12 | Stop reason: HOSPADM

## 2024-03-08 RX ORDER — BISACODYL 10 MG
10 SUPPOSITORY, RECTAL RECTAL DAILY PRN
Status: DISCONTINUED | OUTPATIENT
Start: 2024-03-08 | End: 2024-03-12 | Stop reason: HOSPADM

## 2024-03-08 RX ORDER — MORPHINE SULFATE 2 MG/ML
2 INJECTION, SOLUTION INTRAMUSCULAR; INTRAVENOUS EVERY 4 HOURS PRN
Status: DISCONTINUED | OUTPATIENT
Start: 2024-03-08 | End: 2024-03-12 | Stop reason: HOSPADM

## 2024-03-08 RX ORDER — FAMOTIDINE 20 MG/1
40 TABLET, FILM COATED ORAL DAILY
Status: DISCONTINUED | OUTPATIENT
Start: 2024-03-08 | End: 2024-03-12 | Stop reason: HOSPADM

## 2024-03-08 RX ORDER — PANTOPRAZOLE SODIUM 40 MG/10ML
40 INJECTION, POWDER, LYOPHILIZED, FOR SOLUTION INTRAVENOUS
Status: DISCONTINUED | OUTPATIENT
Start: 2024-03-08 | End: 2024-03-08

## 2024-03-08 RX ORDER — SODIUM CHLORIDE 9 MG/ML
125 INJECTION, SOLUTION INTRAVENOUS CONTINUOUS
Status: ACTIVE | OUTPATIENT
Start: 2024-03-08 | End: 2024-03-10

## 2024-03-08 RX ORDER — FENOFIBRATE 145 MG/1
145 TABLET, COATED ORAL DAILY
Status: DISCONTINUED | OUTPATIENT
Start: 2024-03-08 | End: 2024-03-12 | Stop reason: HOSPADM

## 2024-03-08 RX ORDER — SODIUM CHLORIDE 0.9 % (FLUSH) 0.9 %
10 SYRINGE (ML) INJECTION AS NEEDED
Status: DISCONTINUED | OUTPATIENT
Start: 2024-03-08 | End: 2024-03-12 | Stop reason: HOSPADM

## 2024-03-08 RX ORDER — ERGOCALCIFEROL 1.25 MG/1
50000 CAPSULE ORAL WEEKLY
COMMUNITY

## 2024-03-08 RX ADMIN — KETOROLAC TROMETHAMINE 15 MG: 30 INJECTION, SOLUTION INTRAMUSCULAR; INTRAVENOUS at 21:02

## 2024-03-08 RX ADMIN — MONTELUKAST 10 MG: 10 TABLET, FILM COATED ORAL at 21:00

## 2024-03-08 RX ADMIN — SODIUM CHLORIDE 125 ML/HR: 9 INJECTION, SOLUTION INTRAVENOUS at 15:31

## 2024-03-08 RX ADMIN — PANCRELIPASE 12000 UNITS OF LIPASE: 30000; 6000; 19000 CAPSULE, DELAYED RELEASE PELLETS ORAL at 12:32

## 2024-03-08 RX ADMIN — LEVETIRACETAM 500 MG: 500 TABLET, FILM COATED ORAL at 21:00

## 2024-03-08 RX ADMIN — FAMOTIDINE 40 MG: 20 TABLET ORAL at 10:30

## 2024-03-08 RX ADMIN — PROPRANOLOL HYDROCHLORIDE 60 MG: 10 TABLET ORAL at 10:30

## 2024-03-08 RX ADMIN — FENOFIBRATE 145 MG: 145 TABLET, FILM COATED ORAL at 10:29

## 2024-03-08 RX ADMIN — SODIUM CHLORIDE 200 ML/HR: 9 INJECTION, SOLUTION INTRAVENOUS at 02:33

## 2024-03-08 RX ADMIN — Medication 10 ML: at 08:33

## 2024-03-08 RX ADMIN — PROPRANOLOL HYDROCHLORIDE 60 MG: 10 TABLET ORAL at 21:00

## 2024-03-08 RX ADMIN — SERTRALINE HYDROCHLORIDE 50 MG: 25 TABLET, FILM COATED ORAL at 10:29

## 2024-03-08 RX ADMIN — PANTOPRAZOLE SODIUM 40 MG: 40 INJECTION, POWDER, FOR SOLUTION INTRAVENOUS at 05:14

## 2024-03-08 RX ADMIN — MORPHINE SULFATE 2 MG: 2 INJECTION, SOLUTION INTRAMUSCULAR; INTRAVENOUS at 02:34

## 2024-03-08 RX ADMIN — PANCRELIPASE 12000 UNITS OF LIPASE: 30000; 6000; 19000 CAPSULE, DELAYED RELEASE PELLETS ORAL at 18:03

## 2024-03-08 RX ADMIN — MORPHINE SULFATE 2 MG: 2 INJECTION, SOLUTION INTRAMUSCULAR; INTRAVENOUS at 07:50

## 2024-03-08 RX ADMIN — ATORVASTATIN CALCIUM 20 MG: 20 TABLET, FILM COATED ORAL at 10:30

## 2024-03-08 RX ADMIN — RIVAROXABAN 20 MG: 20 TABLET, FILM COATED ORAL at 08:33

## 2024-03-08 RX ADMIN — KETOROLAC TROMETHAMINE 15 MG: 30 INJECTION, SOLUTION INTRAMUSCULAR; INTRAVENOUS at 10:30

## 2024-03-08 RX ADMIN — Medication 10 ML: at 21:52

## 2024-03-08 RX ADMIN — LEVETIRACETAM 500 MG: 500 TABLET, FILM COATED ORAL at 10:30

## 2024-03-08 RX ADMIN — ONDANSETRON 4 MG: 2 INJECTION INTRAMUSCULAR; INTRAVENOUS at 10:33

## 2024-03-08 RX ADMIN — ONDANSETRON 4 MG: 2 INJECTION INTRAMUSCULAR; INTRAVENOUS at 21:48

## 2024-03-08 RX ADMIN — MORPHINE SULFATE 2 MG: 2 INJECTION, SOLUTION INTRAMUSCULAR; INTRAVENOUS at 15:29

## 2024-03-08 RX ADMIN — SODIUM CHLORIDE 200 ML/HR: 9 INJECTION, SOLUTION INTRAVENOUS at 07:46

## 2024-03-08 NOTE — H&P
[facility]   HISTORY AND PHYSICAL    Patient Name: Tram García  : 1985  MRN: [id number]  Primary Care Physician:  Phyllis Meyers APRN  Date of admission: 3/8/2024    Subjective   Subjective     Chief Complaint: Abdominal pain    HPI:    Tram García is a 38 y.o. female with past medical history of hypertension, hyperlipidemia, DVT with clotting disorder on Xarelto, anxiety, migraines, GERD was transferred to this facility from [facility] with acute pancreatitis.  Patient states that for the last 2 days she has been having abdominal pain that was intermittent, sharp, in the left upper quadrant, radiating to her back, sharp, 10 out of 10 at its worst, and made worse with eating.  Yesterday afternoon patient's abdominal pain became unbearable so she went to outside facility for further evaluation.  While there she was found to have a slightly elevated lipase of 108 with CT of the abdomen showing findings of mild pancreatitis.  There were no available inpatient beds at [place] with patient was transferred here for higher level of care.  At our facility patient's vitals were all within normal limits on arrival.  Her lipase level normalized and remaining labs were also unremarkable given her chronic conditions.  When asked she denied any recent fevers, chills, headaches, focal weakness, chest pain, palpitation, diarrhea, constipation, dysuria, hematuria, hematochezia, melena, or anxiety.  Patient admitted for further evaluation and treatment    Review of Systems   All systems were reviewed and negative except for: As per HPI    Personal History     Past Medical History:   Diagnosis Date    Abnormal ECG     Acid reflux     Anemia     Anxiety 2014    Asthma     Bleeding disorder     Cholelithiasis     Clotting disorder     Deep vein thrombosis 2018    Depression     Foot sprain, right, initial encounter 10/21/2015    GERD (gastroesophageal reflux disease)     HTN  (hypertension)     gestational    Migraine headache     Pancreatitis 2022    [facility] -[facility]PAT[facility]    Pineal gland cyst 2014    Renal cyst 2014       Past Surgical History:   Procedure Laterality Date     SECTION      CHOLECYSTECTOMY      COLONOSCOPY N/A 2023    Procedure: COLONOSCOPY WITH BIOPSIES;  Surgeon: Feliz Olguin MD;  Location: [facility] ENDOSCOPY;  Service: Gastroenterology;  Laterality: N/A;  NORMAL COLONOSCOPY    ENDOSCOPY N/A 10/22/2021    Procedure: ESOPHAGOGASTRODUODENOSCOPY WITH BIOPSIES;  Surgeon: Feliz Olguin MD;  Location: [facility] ENDOSCOPY;  Service: Gastroenterology;  Laterality: N/A;  NORMAL EGD    ENDOSCOPY N/A 2023    Procedure: ESOPHAGOGASTRODUODENOSCOPY WITH BIOPSIES;  Surgeon: Feliz Olguin MD;  Location: [facility] ENDOSCOPY;  Service: Gastroenterology;  Laterality: N/A;  HIATAL HERNIA    LASER ABLATION      TUBAL ABDOMINAL LIGATION  2012       Family History: family history includes Anxiety disorder in her mother; Arthritis in her father and mother; Diabetes in her father and mother; Hyperlipidemia in her mother; Kidney failure in her father; Liver disease in her mother; Other in her father; Stroke in her mother; Thyroid disease in her mother. Otherwise pertinent FHx was reviewed and not pertinent to current issue.    Social History:  reports that she quit smoking about 16 months ago. Her smoking use included cigarettes. She started smoking about 16 years ago. She has a 15 pack-year smoking history. She has been exposed to tobacco smoke. She has never used smokeless tobacco. She reports that she does not drink alcohol and does not use drugs.    Home Medications:  Pancrelipase (Lip-Prot-Amyl), amitriptyline, famotidine, fenofibrate, levETIRAcetam, montelukast, naproxen, ondansetron ODT, pantoprazole, propranolol, rivaroxaban, sertraline, simvastatin, and tiZANidine      Allergies:  Allergies   Allergen Reactions    Sumatriptan  Shortness Of Breath    Amoxicillin Hives     Hives and blisters    Tylenol [Acetaminophen] Hives       Objective   Objective     Vitals:   Temp:  [98.2 °F (36.8 °C)] 98.2 °F (36.8 °C)  Heart Rate:  [80] 80  Resp:  [20] 20  BP: (131)/(65) 131/65  Physical Exam    Constitutional: Awake, alert   Eyes: PERRLA, sclerae anicteric, no conjunctival injection   HENT: NCAT, mucous membranes moist   Neck: Supple, no thyromegaly, no lymphadenopathy, trachea midline   Respiratory: Clear to auscultation bilaterally, nonlabored respirations    Cardiovascular: RRR, no murmurs, rubs, or gallops, palpable pedal pulses bilaterally   Gastrointestinal: Abdominal tenderness, positive bowel sounds, soft, nondistended   Musculoskeletal: No bilateral ankle edema, no clubbing or cyanosis to extremities   Psychiatric: Appropriate affect, cooperative   Neurologic: Oriented x 3, strength symmetric in all extremities, Cranial Nerves grossly intact to confrontation, speech clear   Skin: No rashes     Result Review    Result Review:  I have personally reviewed the results from the time of this admission to 3/8/2024 04:45 EST and agree with these findings:  [x]  Laboratory list / accordion  []  Microbiology  [x]  Radiology  [x]  EKG/Telemetry   []  Cardiology/Vascular   []  Pathology  []  Old records  []  Other:  Most notable findings include: Labs unremarkable, pancreatitis on CT from prior facility      Assessment & Plan   Assessment / Plan     Brief Patient Summary:  Tram García is a 38 y.o. female with past medical history of hypertension, hyperlipidemia, DVT with clotting disorder on Xarelto, anxiety, migraines, GERD was transferred to this facility from [facility] with acute pancreatitis    Active Hospital Problems:  Active Hospital Problems    Diagnosis     **Pancreatitis     History of DVT (deep vein thrombosis)     Migraine     Hypertension     GERD (gastroesophageal reflux disease)     Depression     Anemia      Plan:      Pancreatitis  -Admit to medical floor  -CT of the abdomen reviewed  -Patient is status postcholecystectomy   -Denies alcohol consumption  -Lipase within normal limits at our facility  -Pain meds as needed  -Antiemetics as needed  -Protonix  -Aggressive IVF  -Will follow labs  -N.p.o., advance as tolerated  -GI/GS consult if warranted  -Supportive care    HTN  -Currently well controlled  -PRN BP meds  -Resume home meds when available  -Titrate if needed    DVT  -Eliquis    GI ppx        CODE STATUS:    Level Of Support Discussed With: Patient  Code Status (Patient has no pulse and is not breathing): CPR (Attempt to Resuscitate)  Medical Interventions (Patient has pulse or is breathing): Full Support    Admission Status:  I believe this patient meets inpatient status.      Electronically signed by Solomon Yeung MD, 03/08/24, 4:45 AM EST.

## 2024-03-08 NOTE — SIGNIFICANT NOTE
03/08/24 0945   Coping/Psychosocial   Observed Emotional State calm;cooperative   Verbalized Emotional State relief;hopefulness   Trust Relationship/Rapport empathic listening provided   Involvement in Care interacting with patient   Additional Documentation Spiritual Care (Group)   Spiritual Care   Use of Spiritual Resources non-[employer] use of spiritual care   Spiritual Care Source  initiative   Spiritual Care Follow-Up follow-up, none required as presently assessed   Response to Spiritual Care receptive of support   Spiritual Care Interventions supportive conversation provided   Spiritual Care Visit Type initial   Receptivity to Spiritual Care visit welcomed

## 2024-03-08 NOTE — PLAN OF CARE
Goal Outcome Evaluation:  Plan of Care Reviewed With: patient, spouse, family        Progress: no change  Outcome Evaluation: Pt remained A&Ox4 this shift. Also, pt remained on room air maintaining stable oxygen saturation. Pt was medicated with PRN Morphine and Toradol to help achieve an accetpable pain tolerance level. Pt was also medicated with PRN Zofran to help relieve nausea. All other vital signs remained stable.

## 2024-03-08 NOTE — PLAN OF CARE
Goal Outcome Evaluation:         Patient A/Ox4. PRN pain medication administered for epigastric abd pain. IVF running at 200ml/hr.

## 2024-03-09 LAB
ALBUMIN SERPL-MCNC: 3.6 G/DL (ref 3.5–5.2)
ALBUMIN/GLOB SERPL: 1.6 G/DL
ALP SERPL-CCNC: 65 U/L (ref 39–117)
ALT SERPL W P-5'-P-CCNC: 73 U/L (ref 1–33)
ANION GAP SERPL CALCULATED.3IONS-SCNC: 8.6 MMOL/L (ref 5–15)
AST SERPL-CCNC: 53 U/L (ref 1–32)
BASOPHILS # BLD AUTO: 0.05 10*3/MM3 (ref 0–0.2)
BASOPHILS NFR BLD AUTO: 0.7 % (ref 0–1.5)
BILIRUB SERPL-MCNC: 0.8 MG/DL (ref 0–1.2)
BUN SERPL-MCNC: 10 MG/DL (ref 6–20)
BUN/CREAT SERPL: 13.3 (ref 7–25)
CALCIUM SPEC-SCNC: 8.5 MG/DL (ref 8.6–10.5)
CHLORIDE SERPL-SCNC: 106 MMOL/L (ref 98–107)
CK SERPL-CCNC: 54 U/L (ref 20–180)
CO2 SERPL-SCNC: 22.4 MMOL/L (ref 22–29)
CREAT SERPL-MCNC: 0.75 MG/DL (ref 0.57–1)
DEPRECATED RDW RBC AUTO: 45.8 FL (ref 37–54)
EGFRCR SERPLBLD CKD-EPI 2021: 104 ML/MIN/1.73
EOSINOPHIL # BLD AUTO: 0.38 10*3/MM3 (ref 0–0.4)
EOSINOPHIL NFR BLD AUTO: 5.2 % (ref 0.3–6.2)
ERYTHROCYTE [DISTWIDTH] IN BLOOD BY AUTOMATED COUNT: 13.8 % (ref 12.3–15.4)
GLOBULIN UR ELPH-MCNC: 2.3 GM/DL
GLUCOSE SERPL-MCNC: 94 MG/DL (ref 65–99)
HCT VFR BLD AUTO: 43.6 % (ref 34–46.6)
HGB BLD-MCNC: 14.3 G/DL (ref 12–15.9)
IMM GRANULOCYTES # BLD AUTO: 0.02 10*3/MM3 (ref 0–0.05)
IMM GRANULOCYTES NFR BLD AUTO: 0.3 % (ref 0–0.5)
LYMPHOCYTES # BLD AUTO: 2.94 10*3/MM3 (ref 0.7–3.1)
LYMPHOCYTES NFR BLD AUTO: 40.1 % (ref 19.6–45.3)
MCH RBC QN AUTO: 29.6 PG (ref 26.6–33)
MCHC RBC AUTO-ENTMCNC: 32.8 G/DL (ref 31.5–35.7)
MCV RBC AUTO: 90.3 FL (ref 79–97)
MONOCYTES # BLD AUTO: 0.56 10*3/MM3 (ref 0.1–0.9)
MONOCYTES NFR BLD AUTO: 7.6 % (ref 5–12)
NEUTROPHILS NFR BLD AUTO: 3.38 10*3/MM3 (ref 1.7–7)
NEUTROPHILS NFR BLD AUTO: 46.1 % (ref 42.7–76)
NRBC BLD AUTO-RTO: 0 /100 WBC (ref 0–0.2)
PLATELET # BLD AUTO: 178 10*3/MM3 (ref 140–450)
PMV BLD AUTO: 10.3 FL (ref 6–12)
POTASSIUM SERPL-SCNC: 4.1 MMOL/L (ref 3.5–5.2)
PROT SERPL-MCNC: 5.9 G/DL (ref 6–8.5)
RBC # BLD AUTO: 4.83 10*6/MM3 (ref 3.77–5.28)
SODIUM SERPL-SCNC: 137 MMOL/L (ref 136–145)
WBC NRBC COR # BLD AUTO: 7.33 10*3/MM3 (ref 3.4–10.8)

## 2024-03-09 PROCEDURE — 82550 ASSAY OF CK (CPK): CPT | Performed by: INTERNAL MEDICINE

## 2024-03-09 PROCEDURE — 25010000002 ONDANSETRON PER 1 MG: Performed by: FAMILY MEDICINE

## 2024-03-09 PROCEDURE — 80053 COMPREHEN METABOLIC PANEL: CPT | Performed by: INTERNAL MEDICINE

## 2024-03-09 PROCEDURE — 85025 COMPLETE CBC W/AUTO DIFF WBC: CPT | Performed by: INTERNAL MEDICINE

## 2024-03-09 PROCEDURE — 25010000002 KETOROLAC TROMETHAMINE PER 15 MG: Performed by: FAMILY MEDICINE

## 2024-03-09 PROCEDURE — 25010000002 MORPHINE PER 10 MG: Performed by: FAMILY MEDICINE

## 2024-03-09 PROCEDURE — 25810000003 SODIUM CHLORIDE 0.9 % SOLUTION: Performed by: INTERNAL MEDICINE

## 2024-03-09 PROCEDURE — 99233 SBSQ HOSP IP/OBS HIGH 50: CPT | Performed by: INTERNAL MEDICINE

## 2024-03-09 RX ADMIN — LEVETIRACETAM 500 MG: 500 TABLET, FILM COATED ORAL at 09:41

## 2024-03-09 RX ADMIN — Medication 10 ML: at 20:42

## 2024-03-09 RX ADMIN — PANCRELIPASE 12000 UNITS OF LIPASE: 30000; 6000; 19000 CAPSULE, DELAYED RELEASE PELLETS ORAL at 17:38

## 2024-03-09 RX ADMIN — MORPHINE SULFATE 2 MG: 2 INJECTION, SOLUTION INTRAMUSCULAR; INTRAVENOUS at 20:38

## 2024-03-09 RX ADMIN — SODIUM CHLORIDE 125 ML/HR: 9 INJECTION, SOLUTION INTRAVENOUS at 07:39

## 2024-03-09 RX ADMIN — ONDANSETRON 4 MG: 2 INJECTION INTRAMUSCULAR; INTRAVENOUS at 07:48

## 2024-03-09 RX ADMIN — SODIUM CHLORIDE 125 ML/HR: 9 INJECTION, SOLUTION INTRAVENOUS at 00:09

## 2024-03-09 RX ADMIN — RIVAROXABAN 20 MG: 20 TABLET, FILM COATED ORAL at 09:41

## 2024-03-09 RX ADMIN — SERTRALINE HYDROCHLORIDE 50 MG: 25 TABLET, FILM COATED ORAL at 09:41

## 2024-03-09 RX ADMIN — ATORVASTATIN CALCIUM 20 MG: 20 TABLET, FILM COATED ORAL at 09:41

## 2024-03-09 RX ADMIN — KETOROLAC TROMETHAMINE 15 MG: 30 INJECTION, SOLUTION INTRAMUSCULAR; INTRAVENOUS at 15:48

## 2024-03-09 RX ADMIN — FAMOTIDINE 40 MG: 20 TABLET ORAL at 09:41

## 2024-03-09 RX ADMIN — Medication 10 ML: at 07:48

## 2024-03-09 RX ADMIN — PANCRELIPASE 12000 UNITS OF LIPASE: 30000; 6000; 19000 CAPSULE, DELAYED RELEASE PELLETS ORAL at 09:41

## 2024-03-09 RX ADMIN — PROPRANOLOL HYDROCHLORIDE 60 MG: 10 TABLET ORAL at 20:38

## 2024-03-09 RX ADMIN — PROPRANOLOL HYDROCHLORIDE 60 MG: 10 TABLET ORAL at 09:41

## 2024-03-09 RX ADMIN — LEVETIRACETAM 500 MG: 500 TABLET, FILM COATED ORAL at 20:38

## 2024-03-09 RX ADMIN — FENOFIBRATE 145 MG: 145 TABLET, FILM COATED ORAL at 09:41

## 2024-03-09 RX ADMIN — MONTELUKAST 10 MG: 10 TABLET, FILM COATED ORAL at 20:38

## 2024-03-09 RX ADMIN — PANCRELIPASE 12000 UNITS OF LIPASE: 30000; 6000; 19000 CAPSULE, DELAYED RELEASE PELLETS ORAL at 12:29

## 2024-03-09 RX ADMIN — KETOROLAC TROMETHAMINE 15 MG: 30 INJECTION, SOLUTION INTRAMUSCULAR; INTRAVENOUS at 07:48

## 2024-03-09 RX ADMIN — SODIUM CHLORIDE 125 ML/HR: 9 INJECTION, SOLUTION INTRAVENOUS at 15:45

## 2024-03-09 NOTE — PLAN OF CARE
Goal Outcome Evaluation:  Plan of Care Reviewed With: patient        Progress: no change  Outcome Evaluation: Patient alert and oriented, vital signs stable. Patient complains of abdominal pain and intermittent nausea. PRN medications administered as ordered. Patient had one incidence of loose stool. Patient reports blood in urine, MD notified. Patient able to ambulate to restroom ad christ.

## 2024-03-09 NOTE — PLAN OF CARE
Goal Outcome Evaluation:      Pt A/Ox4 on RA. PRN pain and antiemetic medications administered per MAR for upper abdominal pain and nausea.

## 2024-03-09 NOTE — PROGRESS NOTES
[facility]   Hospitalist Progress Note  Date: 3/9/2024  Patient Name: Tram García  : 1985  MRN: [id number]  Date of admission: 3/8/2024    Subjective   Subjective     Chief Complaint:   Follow-up acute pancreatitis    Summary:   Tram García is a 38 y.o. female with past medical history of hypertension, hyperlipidemia, DVT with clotting disorder on Xarelto, anxiety, migraines, GERD was transferred to this facility from [facility] with acute pancreatitis.  Patient states that for the last 2 days she has been having abdominal pain that was intermittent, sharp, in the left upper quadrant, radiating to her back, sharp, 10 out of 10 at its worst, and made worse with eating.  Yesterday afternoon patient's abdominal pain became unbearable so she went to outside facility for further evaluation.  While there she was found to have a slightly elevated lipase of 108 with CT of the abdomen showing findings of mild pancreatitis.  There were no available inpatient beds at [place] with patient was transferred here for higher level of care.  At our facility patient's vitals were all within normal limits on arrival.  Her lipase level normalized and remaining labs were also unremarkable given her chronic conditions.  When asked she denied any recent fevers, chills, headaches, focal weakness, chest pain, palpitation, diarrhea, constipation, dysuria, hematuria, hematochezia, melena, or anxiety.  Patient admitted for further evaluation and treatment     Interval Followup:   Over the last 24 hours patient has tolerated clear liquid diet, she is still complaining of abdominal pain, she has some nausea but no vomiting    Objective   Objective     Vitals:   Temp:  [98.1 °F (36.7 °C)] 98.1 °F (36.7 °C)  Heart Rate:  [62-79] 79  Resp:  [16-20] 20  BP: (106-126)/(62-77) 126/62    Physical Exam   GEN: No acute distress  HEENT: Moist mucous membranes  LUNGS: Equal chest rise bilaterally  CARDIAC: Regular rate  and rhythm  NEURO: Moving all 4 extremities spontaneously  SKIN: No obvious breakdown    Result Review    Result Review:  I have personally reviewed the results as below  [x]  Laboratory CBC, CMP personally reviewed  CBC          3/8/2024    02:26 3/9/2024    07:59   CBC   WBC 9.86  7.33    RBC 5.30  4.83    Hemoglobin 15.7  14.3    Hematocrit 47.3  43.6    MCV 89.2  90.3    MCH 29.6  29.6    MCHC 33.2  32.8    RDW 13.9  13.8    Platelets 213  178      CMP          3/8/2024    02:26   CMP   Glucose 104    BUN 11    Creatinine 0.62    EGFR 116.3    Sodium 138    Potassium 4.0    Chloride 107    Calcium 8.9    Total Protein 6.6    Albumin 4.0    Globulin 2.6    Total Bilirubin 0.6    Alkaline Phosphatase 80    AST (SGOT) 61    ALT (SGPT) 79    Albumin/Globulin Ratio 1.5    BUN/Creatinine Ratio 17.7    Anion Gap 12.0      []  Microbiology  []  Radiology  []  EKG/Telemetry   []  Cardiology/Vascular   []  Pathology  []  Old records  []  Other:    Assessment & Plan   Assessment / Plan     Assessment:  Pancreatitis  History of DVT  Migraine  Hypertension  GERD  Depression  Anemia    Plan:  Patient admitted to the hospital for further workup and management of above  Continue IV hydration with 125 cc/h  Advance diet to full liquids today  Continue as needed antiemetics  Continue Protonix  Continue pain control with IV morphine today, high risk medication, monitor for sedation  CBC, CMP reviewed  Repeat CBC, CMP, mag and Phos in a.m.  CT scan of the abdomen personally reviewed, consistent with pancreatic stranding, no fluid collection identified     Reviewed patients labs and imaging, and discussed with patient and nurse at bedside.    DVT prophylaxis:  Medical DVT prophylaxis orders are present.        CODE STATUS:   Level Of Support Discussed With: Patient  Code Status (Patient has no pulse and is not breathing): CPR (Attempt to Resuscitate)  Medical Interventions (Patient has pulse or is breathing): Full  Support        Electronically signed by Efren Sadler MD, 03/09/24, 8:55 AM EST.

## 2024-03-10 LAB
ALBUMIN SERPL-MCNC: 3.5 G/DL (ref 3.5–5.2)
ALBUMIN/GLOB SERPL: 1.4 G/DL
ALP SERPL-CCNC: 65 U/L (ref 39–117)
ALT SERPL W P-5'-P-CCNC: 66 U/L (ref 1–33)
ANION GAP SERPL CALCULATED.3IONS-SCNC: 12.7 MMOL/L (ref 5–15)
AST SERPL-CCNC: 50 U/L (ref 1–32)
BASOPHILS # BLD AUTO: 0.03 10*3/MM3 (ref 0–0.2)
BASOPHILS NFR BLD AUTO: 0.5 % (ref 0–1.5)
BILIRUB SERPL-MCNC: 0.7 MG/DL (ref 0–1.2)
BILIRUB UR QL STRIP: NEGATIVE
BUN SERPL-MCNC: 8 MG/DL (ref 6–20)
BUN/CREAT SERPL: 11.4 (ref 7–25)
CALCIUM SPEC-SCNC: 8.5 MG/DL (ref 8.6–10.5)
CHLORIDE SERPL-SCNC: 107 MMOL/L (ref 98–107)
CLARITY UR: CLEAR
CO2 SERPL-SCNC: 15.3 MMOL/L (ref 22–29)
COLOR UR: YELLOW
CREAT SERPL-MCNC: 0.7 MG/DL (ref 0.57–1)
DEPRECATED RDW RBC AUTO: 45.4 FL (ref 37–54)
EGFRCR SERPLBLD CKD-EPI 2021: 113 ML/MIN/1.73
EOSINOPHIL # BLD AUTO: 0.41 10*3/MM3 (ref 0–0.4)
EOSINOPHIL NFR BLD AUTO: 6.5 % (ref 0.3–6.2)
ERYTHROCYTE [DISTWIDTH] IN BLOOD BY AUTOMATED COUNT: 13.6 % (ref 12.3–15.4)
GLOBULIN UR ELPH-MCNC: 2.5 GM/DL
GLUCOSE SERPL-MCNC: 88 MG/DL (ref 65–99)
GLUCOSE UR STRIP-MCNC: NEGATIVE MG/DL
HCT VFR BLD AUTO: 42.5 % (ref 34–46.6)
HGB BLD-MCNC: 13.8 G/DL (ref 12–15.9)
HGB UR QL STRIP.AUTO: NEGATIVE
IMM GRANULOCYTES # BLD AUTO: 0.02 10*3/MM3 (ref 0–0.05)
IMM GRANULOCYTES NFR BLD AUTO: 0.3 % (ref 0–0.5)
KETONES UR QL STRIP: NEGATIVE
LEUKOCYTE ESTERASE UR QL STRIP.AUTO: NEGATIVE
LYMPHOCYTES # BLD AUTO: 2.36 10*3/MM3 (ref 0.7–3.1)
LYMPHOCYTES NFR BLD AUTO: 37.4 % (ref 19.6–45.3)
MAGNESIUM SERPL-MCNC: 1.6 MG/DL (ref 1.6–2.6)
MCH RBC QN AUTO: 29.6 PG (ref 26.6–33)
MCHC RBC AUTO-ENTMCNC: 32.5 G/DL (ref 31.5–35.7)
MCV RBC AUTO: 91.2 FL (ref 79–97)
MONOCYTES # BLD AUTO: 0.55 10*3/MM3 (ref 0.1–0.9)
MONOCYTES NFR BLD AUTO: 8.7 % (ref 5–12)
NEUTROPHILS NFR BLD AUTO: 2.94 10*3/MM3 (ref 1.7–7)
NEUTROPHILS NFR BLD AUTO: 46.6 % (ref 42.7–76)
NITRITE UR QL STRIP: NEGATIVE
NRBC BLD AUTO-RTO: 0 /100 WBC (ref 0–0.2)
PH UR STRIP.AUTO: 5.5 [PH] (ref 5–8)
PHOSPHATE SERPL-MCNC: 3.2 MG/DL (ref 2.5–4.5)
PLATELET # BLD AUTO: 156 10*3/MM3 (ref 140–450)
PMV BLD AUTO: 10.7 FL (ref 6–12)
POTASSIUM SERPL-SCNC: 3.9 MMOL/L (ref 3.5–5.2)
PROT SERPL-MCNC: 6 G/DL (ref 6–8.5)
PROT UR QL STRIP: NEGATIVE
RBC # BLD AUTO: 4.66 10*6/MM3 (ref 3.77–5.28)
SODIUM SERPL-SCNC: 135 MMOL/L (ref 136–145)
SP GR UR STRIP: 1.01 (ref 1–1.03)
UROBILINOGEN UR QL STRIP: NORMAL
WBC NRBC COR # BLD AUTO: 6.31 10*3/MM3 (ref 3.4–10.8)

## 2024-03-10 PROCEDURE — 99232 SBSQ HOSP IP/OBS MODERATE 35: CPT | Performed by: INTERNAL MEDICINE

## 2024-03-10 PROCEDURE — 25010000002 ONDANSETRON PER 1 MG: Performed by: FAMILY MEDICINE

## 2024-03-10 PROCEDURE — 84100 ASSAY OF PHOSPHORUS: CPT | Performed by: INTERNAL MEDICINE

## 2024-03-10 PROCEDURE — 85025 COMPLETE CBC W/AUTO DIFF WBC: CPT | Performed by: INTERNAL MEDICINE

## 2024-03-10 PROCEDURE — 81003 URINALYSIS AUTO W/O SCOPE: CPT | Performed by: INTERNAL MEDICINE

## 2024-03-10 PROCEDURE — 83735 ASSAY OF MAGNESIUM: CPT | Performed by: INTERNAL MEDICINE

## 2024-03-10 PROCEDURE — 80053 COMPREHEN METABOLIC PANEL: CPT | Performed by: INTERNAL MEDICINE

## 2024-03-10 PROCEDURE — 25810000003 SODIUM CHLORIDE 0.9 % SOLUTION: Performed by: INTERNAL MEDICINE

## 2024-03-10 PROCEDURE — 25010000002 MORPHINE PER 10 MG: Performed by: FAMILY MEDICINE

## 2024-03-10 RX ORDER — OXYCODONE HYDROCHLORIDE 5 MG/1
5 TABLET ORAL EVERY 4 HOURS PRN
Status: DISCONTINUED | OUTPATIENT
Start: 2024-03-10 | End: 2024-03-12 | Stop reason: HOSPADM

## 2024-03-10 RX ADMIN — MORPHINE SULFATE 2 MG: 2 INJECTION, SOLUTION INTRAMUSCULAR; INTRAVENOUS at 20:56

## 2024-03-10 RX ADMIN — RIVAROXABAN 20 MG: 20 TABLET, FILM COATED ORAL at 08:00

## 2024-03-10 RX ADMIN — PANCRELIPASE 12000 UNITS OF LIPASE: 30000; 6000; 19000 CAPSULE, DELAYED RELEASE PELLETS ORAL at 07:59

## 2024-03-10 RX ADMIN — FAMOTIDINE 40 MG: 20 TABLET ORAL at 08:00

## 2024-03-10 RX ADMIN — LEVETIRACETAM 500 MG: 500 TABLET, FILM COATED ORAL at 08:00

## 2024-03-10 RX ADMIN — PANCRELIPASE 12000 UNITS OF LIPASE: 30000; 6000; 19000 CAPSULE, DELAYED RELEASE PELLETS ORAL at 18:00

## 2024-03-10 RX ADMIN — MONTELUKAST 10 MG: 10 TABLET, FILM COATED ORAL at 20:18

## 2024-03-10 RX ADMIN — PROPRANOLOL HYDROCHLORIDE 60 MG: 10 TABLET ORAL at 20:18

## 2024-03-10 RX ADMIN — FENOFIBRATE 145 MG: 145 TABLET, FILM COATED ORAL at 08:00

## 2024-03-10 RX ADMIN — Medication 10 ML: at 20:18

## 2024-03-10 RX ADMIN — Medication 10 ML: at 08:00

## 2024-03-10 RX ADMIN — MORPHINE SULFATE 2 MG: 2 INJECTION, SOLUTION INTRAMUSCULAR; INTRAVENOUS at 08:10

## 2024-03-10 RX ADMIN — PANCRELIPASE 12000 UNITS OF LIPASE: 30000; 6000; 19000 CAPSULE, DELAYED RELEASE PELLETS ORAL at 11:50

## 2024-03-10 RX ADMIN — SODIUM CHLORIDE 125 ML/HR: 9 INJECTION, SOLUTION INTRAVENOUS at 00:11

## 2024-03-10 RX ADMIN — LEVETIRACETAM 500 MG: 500 TABLET, FILM COATED ORAL at 20:18

## 2024-03-10 RX ADMIN — OXYCODONE 5 MG: 5 TABLET ORAL at 18:00

## 2024-03-10 RX ADMIN — PROPRANOLOL HYDROCHLORIDE 60 MG: 10 TABLET ORAL at 08:00

## 2024-03-10 RX ADMIN — ATORVASTATIN CALCIUM 20 MG: 20 TABLET, FILM COATED ORAL at 08:00

## 2024-03-10 RX ADMIN — SERTRALINE HYDROCHLORIDE 50 MG: 25 TABLET, FILM COATED ORAL at 08:00

## 2024-03-10 RX ADMIN — ONDANSETRON 4 MG: 2 INJECTION INTRAMUSCULAR; INTRAVENOUS at 00:11

## 2024-03-10 NOTE — PAYOR COMM NOTE
"Sang García (38 y.o. Female)       Date of Birth   1985    Social Security Number       Address   [id number] [place], [place] [address]    Home Phone   [phone]    MRN   [id number]       Denominational   None    Marital Status   Single                            Admission Date   3/8/24    Admission Type   Urgent    Admitting Provider   Solomon Yeung MD    Attending Provider   Efren Sadler MD    Department, Room/Bed   [address], [facility]/       Discharge Date       Discharge Disposition       Discharge Destination                                 Attending Provider: Efren Sadler MD    Allergies: Sumatriptan, Amoxicillin, Tylenol [Acetaminophen]    Isolation: None   Infection: None   Code Status: CPR    Ht: 157.5 cm (62\")   Wt: 101 kg (223 lb 5.2 oz)    Admission Cmt: None   Principal Problem: Pancreatitis [K85.90]                   Active Insurance as of 3/8/2024       Primary Coverage       Payor Plan Insurance Group Employer/Plan Group    HUMANA MEDICARE REPLACEMENT HUMANA MED ADV HMO [id number]       Payor Plan Address Payor Plan Phone Number Payor Plan Fax Number Effective Dates    [address] [address] [phone]  2023 - None Entered    [facility] [id number]         Subscriber Name Subscriber Birth Date Member ID       SANG GARCÍA 1985 [id number]                     Emergency Contacts        (Rel.) Home Phone Work Phone Mobile Phone    ARYA GOMEZ (Significant Other) [phone] -- [phone]        #[id number] PER ARISTIDES JEFFERS  [facility] [phone]       Solomon Yeung MD   Physician  Hospitalist     H&P     Signed     Date of Service: 24  Creation Time: 24     Signed       Expand All Collapse All     [facility]   HISTORY AND PHYSICAL     Patient Name: Sang García  : 1985  MRN: [id number]  Primary Care Physician:  Phyllis Meyers, " APRN  Date of admission: 3/8/2024        Subjective[]Expand by Default  Subjective      Chief Complaint: Abdominal pain     HPI:     Tram García is a 38 y.o. female with past medical history of hypertension, hyperlipidemia, DVT with clotting disorder on Xarelto, anxiety, migraines, GERD was transferred to this facility from [facility] with acute pancreatitis.  Patient states that for the last 2 days she has been having abdominal pain that was intermittent, sharp, in the left upper quadrant, radiating to her back, sharp, 10 out of 10 at its worst, and made worse with eating.  Yesterday afternoon patient's abdominal pain became unbearable so she went to outside facility for further evaluation.  While there she was found to have a slightly elevated lipase of 108 with CT of the abdomen showing findings of mild pancreatitis.  There were no available inpatient beds at [place] with patient was transferred here for higher level of care.  At our facility patient's vitals were all within normal limits on arrival.  Her lipase level normalized and remaining labs were also unremarkable given her chronic conditions.  When asked she denied any recent fevers, chills, headaches, focal weakness, chest pain, palpitation, diarrhea, constipation, dysuria, hematuria, hematochezia, melena, or anxiety.  Patient admitted for further evaluation and treatment     Review of Systems              All systems were reviewed and negative except for: As per HPI     Personal History      Medical History        Past Medical History:   Diagnosis Date    Abnormal ECG      Acid reflux      Anemia      Anxiety 02/17/2014    Asthma      Bleeding disorder      Cholelithiasis      Clotting disorder      Deep vein thrombosis 2018    Depression      Foot sprain, right, initial encounter 10/21/2015    GERD (gastroesophageal reflux disease)      HTN (hypertension)       gestational    Migraine headache      Pancreatitis 11/2022     [facility]    Pineal gland cyst 2014    Renal cyst 2014            Surgical History         Past Surgical History:   Procedure Laterality Date     SECTION        CHOLECYSTECTOMY        COLONOSCOPY N/A 2023     Procedure: COLONOSCOPY WITH BIOPSIES;  Surgeon: Feliz Olguin MD;  Location: [facility] ENDOSCOPY;  Service: Gastroenterology;  Laterality: N/A;  NORMAL COLONOSCOPY    ENDOSCOPY N/A 10/22/2021     Procedure: ESOPHAGOGASTRODUODENOSCOPY WITH BIOPSIES;  Surgeon: Feliz Olguin MD;  Location: [facility] ENDOS[facility];  Service: Gastroenterology;  Laterality: N/A;  NORMAL EGD    ENDOSCOPY N/A 2023     Procedure: ESOPHAGOGASTRODUODENOSCOPY WITH BIOPSIES;  Surgeon: Feliz Olguin MD;  Location: [facility] ENDOSCOPY;  Service: Gastroenterology;  Laterality: N/A;  HIATAL HERNIA    LASER ABLATION        TUBAL ABDOMINAL LIGATION   2012            Family History: family history includes Anxiety disorder in her mother; Arthritis in her father and mother; Diabetes in her father and mother; Hyperlipidemia in her mother; Kidney failure in her father; Liver disease in her mother; Other in her father; Stroke in her mother; Thyroid disease in her mother. Otherwise pertinent FHx was reviewed and not pertinent to current issue.     Social History:  reports that she quit smoking about 16 months ago. Her smoking use included cigarettes. She started smoking about 16 years ago. She has a 15 pack-year smoking history. She has been exposed to tobacco smoke. She has never used smokeless tobacco. She reports that she does not drink alcohol and does not use drugs.     Home Medications:  Pancrelipase (Lip-Prot-Amyl), amitriptyline, famotidine, fenofibrate, levETIRAcetam, montelukast, naproxen, ondansetron ODT, pantoprazole, propranolol, rivaroxaban, sertraline, simvastatin, and tiZANidine        Allergies:  Allergies         Allergies   Allergen Reactions    Sumatriptan Shortness Of Breath     Amoxicillin Hives       Hives and blisters    Tylenol [Acetaminophen] Hives                  Objective  Objective      Vitals:   Temp:  [98.2 °F (36.8 °C)] 98.2 °F (36.8 °C)  Heart Rate:  [80] 80  Resp:  [20] 20  BP: (131)/(65) 131/65  Physical Exam                         Constitutional: Awake, alert              Eyes: PERRLA, sclerae anicteric, no conjunctival injection              HENT: NCAT, mucous membranes moist              Neck: Supple, no thyromegaly, no lymphadenopathy, trachea midline              Respiratory: Clear to auscultation bilaterally, nonlabored respirations               Cardiovascular: RRR, no murmurs, rubs, or gallops, palpable pedal pulses bilaterally              Gastrointestinal: Abdominal tenderness, positive bowel sounds, soft, nondistended              Musculoskeletal: No bilateral ankle edema, no clubbing or cyanosis to extremities              Psychiatric: Appropriate affect, cooperative              Neurologic: Oriented x 3, strength symmetric in all extremities, Cranial Nerves grossly intact to confrontation, speech clear              Skin: No rashes            Result Review  Result Review:  I have personally reviewed the results from the time of this admission to 3/8/2024 04:45 EST and agree with these findings:  [x]  Laboratory list / accordion  []  Microbiology  [x]  Radiology  [x]  EKG/Telemetry   []  Cardiology/Vascular   []  Pathology  []  Old records  []  Other:  Most notable findings include: Labs unremarkable, pancreatitis on CT from prior facility              Assessment & Plan  Assessment / Plan      Brief Patient Summary:  Tram García is a 38 y.o. female with past medical history of hypertension, hyperlipidemia, DVT with clotting disorder on Xarelto, anxiety, migraines, GERD was transferred to this facility from [facility] with acute pancreatitis     Active Hospital Problems:       Active Hospital Problems     Diagnosis      **Pancreatitis      History  of DVT (deep vein thrombosis)      Migraine      Hypertension      GERD (gastroesophageal reflux disease)      Depression      Anemia        Plan:      Pancreatitis  -Admit to medical floor  -CT of the abdomen reviewed  -Patient is status postcholecystectomy   -Denies alcohol consumption  -Lipase within normal limits at our facility  -Pain meds as needed  -Antiemetics as needed  -Protonix  -Aggressive IVF  -Will follow labs  -N.p.o., advance as tolerated  -GI/GS consult if warranted  -Supportive care     HTN  -Currently well controlled  -PRN BP meds  -Resume home meds when available  -Titrate if needed     DVT  -Eliquis     GI ppx           CODE STATUS:    Level Of Support Discussed With: Patient  Code Status (Patient has no pulse and is not breathing): CPR (Attempt to Resuscitate)  Medical Interventions (Patient has pulse or is breathing): Full Support     Admission Status:  I believe this patient meets inpatient status.        Electronically signed by Solomon Yeung MD, 24, 4:45 AM EST.                    Routing History        Efren Sadler MD   Physician  Hospitalist     Progress Notes     Signed     Date of Service: 03/10/24 1053  Creation Time: 03/10/24 1053     Signed       Expand All Collapse All     [facility]   Hospitalist Progress Note  Date: 3/10/2024  Patient Name: Tram García  : 1985  MRN: [id number]  Date of admission: 3/8/2024        Subjective[]Expand by Default  Subjective      Chief Complaint:   Follow-up acute pancreatitis     Summary:   Tram García is a 38 y.o. female with past medical history of hypertension, hyperlipidemia, DVT with clotting disorder on Xarelto, anxiety, migraines, GERD was transferred to this facility from [facility] with acute pancreatitis.  Patient states that for the last 2 days she has been having abdominal pain that was intermittent, sharp, in the left upper quadrant, radiating to her back, sharp, 10 out of 10 at its  worst, and made worse with eating.  Yesterday afternoon patient's abdominal pain became unbearable so she went to outside facility for further evaluation.  While there she was found to have a slightly elevated lipase of 108 with CT of the abdomen showing findings of mild pancreatitis.  There were no available inpatient beds at [place] with patient was transferred here for higher level of care.  At our facility patient's vitals were all within normal limits on arrival.  Her lipase level normalized and remaining labs were also unremarkable given her chronic conditions.  When asked she denied any recent fevers, chills, headaches, focal weakness, chest pain, palpitation, diarrhea, constipation, dysuria, hematuria, hematochezia, melena, or anxiety.  Patient admitted for further evaluation and treatment      Interval Followup:   Over the last 24 hours patient resting comfortably in bed.  Still complaining of abdominal pain, had some blood on tissue paper when wiping after bowel movement           Objective  Objective      Vitals:   Temp:  [97.7 °F (36.5 °C)-98.2 °F (36.8 °C)] 97.9 °F (36.6 °C)  Heart Rate:  [56-62] 60  Resp:  [16-20] 18  BP: (113-136)/(70-85) 120/70     Physical Exam             GEN: No acute distress  HEENT: Moist mucous membranes  LUNGS: Equal chest rise bilaterally  CARDIAC: Regular rate and rhythm  NEURO: Moving all 4 extremities spontaneously  SKIN: No obvious breakdown           Result Review  Result Review:  I have personally reviewed the results as below  [x]  Laboratory CBC, CMP personally reviewed  CBC Results   CBC            3/8/2024    02:26 3/9/2024    07:59 3/10/2024    05:25   CBC   WBC 9.86  7.33  6.31    RBC 5.30  4.83  4.66    Hemoglobin 15.7  14.3  13.8    Hematocrit 47.3  43.6  42.5    MCV 89.2  90.3  91.2    MCH 29.6  29.6  29.6    MCHC 33.2  32.8  32.5    RDW 13.9  13.8  13.6    Platelets 213  178  156          CMP Results:   CMP            3/8/2024    02:26 3/9/2024    07:59  3/10/2024    05:25   CMP   Glucose 104  94  88    BUN 11  10  8    Creatinine 0.62  0.75  0.70    EGFR 116.3  104.0  113.0    Sodium 138  137  135    Potassium 4.0  4.1  3.9    Chloride 107  106  107    Calcium 8.9  8.5  8.5    Total Protein 6.6  5.9  6.0    Albumin 4.0  3.6  3.5    Globulin 2.6  2.3  2.5    Total Bilirubin 0.6  0.8  0.7    Alkaline Phosphatase 80  65  65    AST (SGOT) 61  53  50    ALT (SGPT) 79  73  66    Albumin/Globulin Ratio 1.5  1.6  1.4    BUN/Creatinine Ratio 17.7  13.3  11.4    Anion Gap 12.0  8.6  12.7          []  Microbiology  []  Radiology  []  EKG/Telemetry   []  Cardiology/Vascular   []  Pathology  []  Old records  []  Other:           Assessment & Plan  Assessment / Plan      Assessment:  Pancreatitis  History of DVT  Migraine  Hypertension  GERD  Depression  Anemia     Plan:  Patient admitted to the hospital for further workup and management of above  Completed IV hydration  Advance to bland diet today  Add oxycodone for pain  Continue as needed antiemetics  Continue Protonix  Continue pain control with IV morphine today, high risk medication, monitor for sedation  CBC, CMP reviewed 3/10/2024   Repeat CBC, CMP, mag and Phos in a.m. 3/10/2024   Monitor hemoglobin, likely irritation from multiple bowel movements  Continue home Creon  CT scan of the abdomen personally reviewed, consistent with pancreatic stranding, no fluid collection identified     Reviewed patients labs and imaging, and discussed with patient and nurse at bedside.    DVT prophylaxis:  Medical DVT prophylaxis orders are present.           CODE STATUS:   Level Of Support Discussed With: Patient  Code Status (Patient has no pulse and is not breathing): CPR (Attempt to Resuscitate)  Medical Interventions (Patient has pulse or is breathing): Full Support          Electronically signed by Efren Sadler MD, 03/10/24, 10:54 AM EDT.

## 2024-03-10 NOTE — PROGRESS NOTES
[facility]   Hospitalist Progress Note  Date: 3/10/2024  Patient Name: Tram García  : 1985  MRN: [id number]  Date of admission: 3/8/2024    Subjective   Subjective     Chief Complaint:   Follow-up acute pancreatitis    Summary:   Tram García is a 38 y.o. female with past medical history of hypertension, hyperlipidemia, DVT with clotting disorder on Xarelto, anxiety, migraines, GERD was transferred to this facility from [facility] with acute pancreatitis.  Patient states that for the last 2 days she has been having abdominal pain that was intermittent, sharp, in the left upper quadrant, radiating to her back, sharp, 10 out of 10 at its worst, and made worse with eating.  Yesterday afternoon patient's abdominal pain became unbearable so she went to outside facility for further evaluation.  While there she was found to have a slightly elevated lipase of 108 with CT of the abdomen showing findings of mild pancreatitis.  There were no available inpatient beds at [place] with patient was transferred here for higher level of care.  At our facility patient's vitals were all within normal limits on arrival.  Her lipase level normalized and remaining labs were also unremarkable given her chronic conditions.  When asked she denied any recent fevers, chills, headaches, focal weakness, chest pain, palpitation, diarrhea, constipation, dysuria, hematuria, hematochezia, melena, or anxiety.  Patient admitted for further evaluation and treatment     Interval Followup:   Over the last 24 hours patient resting comfortably in bed.  Still complaining of abdominal pain, had some blood on tissue paper when wiping after bowel movement    Objective   Objective     Vitals:   Temp:  [97.7 °F (36.5 °C)-98.2 °F (36.8 °C)] 97.9 °F (36.6 °C)  Heart Rate:  [56-62] 60  Resp:  [16-20] 18  BP: (113-136)/(70-85) 120/70    Physical Exam   GEN: No acute distress  HEENT: Moist mucous membranes  LUNGS: Equal chest rise  bilaterally  CARDIAC: Regular rate and rhythm  NEURO: Moving all 4 extremities spontaneously  SKIN: No obvious breakdown    Result Review    Result Review:  I have personally reviewed the results as below  [x]  Laboratory CBC, CMP personally reviewed  CBC          3/8/2024    02:26 3/9/2024    07:59 3/10/2024    05:25   CBC   WBC 9.86  7.33  6.31    RBC 5.30  4.83  4.66    Hemoglobin 15.7  14.3  13.8    Hematocrit 47.3  43.6  42.5    MCV 89.2  90.3  91.2    MCH 29.6  29.6  29.6    MCHC 33.2  32.8  32.5    RDW 13.9  13.8  13.6    Platelets 213  178  156      CMP          3/8/2024    02:26 3/9/2024    07:59 3/10/2024    05:25   CMP   Glucose 104  94  88    BUN 11  10  8    Creatinine 0.62  0.75  0.70    EGFR 116.3  104.0  113.0    Sodium 138  137  135    Potassium 4.0  4.1  3.9    Chloride 107  106  107    Calcium 8.9  8.5  8.5    Total Protein 6.6  5.9  6.0    Albumin 4.0  3.6  3.5    Globulin 2.6  2.3  2.5    Total Bilirubin 0.6  0.8  0.7    Alkaline Phosphatase 80  65  65    AST (SGOT) 61  53  50    ALT (SGPT) 79  73  66    Albumin/Globulin Ratio 1.5  1.6  1.4    BUN/Creatinine Ratio 17.7  13.3  11.4    Anion Gap 12.0  8.6  12.7      []  Microbiology  []  Radiology  []  EKG/Telemetry   []  Cardiology/Vascular   []  Pathology  []  Old records  []  Other:    Assessment & Plan   Assessment / Plan     Assessment:  Pancreatitis  History of DVT  Migraine  Hypertension  GERD  Depression  Anemia    Plan:  Patient admitted to the hospital for further workup and management of above  Completed IV hydration  Advance to bland diet today  Add oxycodone for pain  Continue as needed antiemetics  Continue Protonix  Continue pain control with IV morphine today, high risk medication, monitor for sedation  CBC, CMP reviewed 3/10/2024   Repeat CBC, CMP, mag and Phos in a.m. 3/10/2024   Monitor hemoglobin, likely irritation from multiple bowel movements  Continue home Creon  CT scan of the abdomen personally reviewed, consistent with  pancreatic stranding, no fluid collection identified     Reviewed patients labs and imaging, and discussed with patient and nurse at bedside.    DVT prophylaxis:  Medical DVT prophylaxis orders are present.        CODE STATUS:   Level Of Support Discussed With: Patient  Code Status (Patient has no pulse and is not breathing): CPR (Attempt to Resuscitate)  Medical Interventions (Patient has pulse or is breathing): Full Support        Electronically signed by Efren Sadler MD, 03/10/24, 10:54 AM EDT.

## 2024-03-10 NOTE — PLAN OF CARE
Goal Outcome Evaluation:         Pt A/Ox4. PRN pain medication administered x1 for upper abdominal pain. PRN Zofran administered x1 for nausea. IVF completed.

## 2024-03-10 NOTE — PLAN OF CARE
Goal Outcome Evaluation:  Plan of Care Reviewed With: patient        Progress: no change  Outcome Evaluation: pt AOx4, on room air. Vital signs stable. Pt complaints of pain, medicated per the MAR. Pt up adlib to use bathroom. Pt resting in bed throughout the day, states just wanting to not be bothered and needing to rest. no complaints at this time.

## 2024-03-11 LAB
ALBUMIN SERPL-MCNC: 3.8 G/DL (ref 3.5–5.2)
ALBUMIN/GLOB SERPL: 1.6 G/DL
ALP SERPL-CCNC: 65 U/L (ref 39–117)
ALT SERPL W P-5'-P-CCNC: 64 U/L (ref 1–33)
ANION GAP SERPL CALCULATED.3IONS-SCNC: 9 MMOL/L (ref 5–15)
AST SERPL-CCNC: 42 U/L (ref 1–32)
BASOPHILS # BLD AUTO: 0.04 10*3/MM3 (ref 0–0.2)
BASOPHILS NFR BLD AUTO: 0.6 % (ref 0–1.5)
BILIRUB SERPL-MCNC: 0.7 MG/DL (ref 0–1.2)
BUN SERPL-MCNC: 9 MG/DL (ref 6–20)
BUN/CREAT SERPL: 10.3 (ref 7–25)
CALCIUM SPEC-SCNC: 9 MG/DL (ref 8.6–10.5)
CHLORIDE SERPL-SCNC: 104 MMOL/L (ref 98–107)
CO2 SERPL-SCNC: 28 MMOL/L (ref 22–29)
CREAT SERPL-MCNC: 0.87 MG/DL (ref 0.57–1)
DEPRECATED RDW RBC AUTO: 43.4 FL (ref 37–54)
EGFRCR SERPLBLD CKD-EPI 2021: 87 ML/MIN/1.73
EOSINOPHIL # BLD AUTO: 0.4 10*3/MM3 (ref 0–0.4)
EOSINOPHIL NFR BLD AUTO: 6 % (ref 0.3–6.2)
ERYTHROCYTE [DISTWIDTH] IN BLOOD BY AUTOMATED COUNT: 13.1 % (ref 12.3–15.4)
GLOBULIN UR ELPH-MCNC: 2.4 GM/DL
GLUCOSE SERPL-MCNC: 91 MG/DL (ref 65–99)
HCT VFR BLD AUTO: 42.9 % (ref 34–46.6)
HGB BLD-MCNC: 13.9 G/DL (ref 12–15.9)
IMM GRANULOCYTES # BLD AUTO: 0.02 10*3/MM3 (ref 0–0.05)
IMM GRANULOCYTES NFR BLD AUTO: 0.3 % (ref 0–0.5)
LYMPHOCYTES # BLD AUTO: 2.59 10*3/MM3 (ref 0.7–3.1)
LYMPHOCYTES NFR BLD AUTO: 39.1 % (ref 19.6–45.3)
MAGNESIUM SERPL-MCNC: 1.7 MG/DL (ref 1.6–2.6)
MCH RBC QN AUTO: 29.3 PG (ref 26.6–33)
MCHC RBC AUTO-ENTMCNC: 32.4 G/DL (ref 31.5–35.7)
MCV RBC AUTO: 90.5 FL (ref 79–97)
MONOCYTES # BLD AUTO: 0.65 10*3/MM3 (ref 0.1–0.9)
MONOCYTES NFR BLD AUTO: 9.8 % (ref 5–12)
NEUTROPHILS NFR BLD AUTO: 2.92 10*3/MM3 (ref 1.7–7)
NEUTROPHILS NFR BLD AUTO: 44.2 % (ref 42.7–76)
NRBC BLD AUTO-RTO: 0 /100 WBC (ref 0–0.2)
PHOSPHATE SERPL-MCNC: 4.1 MG/DL (ref 2.5–4.5)
PLATELET # BLD AUTO: 180 10*3/MM3 (ref 140–450)
PMV BLD AUTO: 10.6 FL (ref 6–12)
POTASSIUM SERPL-SCNC: 4.2 MMOL/L (ref 3.5–5.2)
PROT SERPL-MCNC: 6.2 G/DL (ref 6–8.5)
RBC # BLD AUTO: 4.74 10*6/MM3 (ref 3.77–5.28)
SODIUM SERPL-SCNC: 141 MMOL/L (ref 136–145)
WBC NRBC COR # BLD AUTO: 6.62 10*3/MM3 (ref 3.4–10.8)

## 2024-03-11 PROCEDURE — 83735 ASSAY OF MAGNESIUM: CPT | Performed by: INTERNAL MEDICINE

## 2024-03-11 PROCEDURE — 85025 COMPLETE CBC W/AUTO DIFF WBC: CPT | Performed by: INTERNAL MEDICINE

## 2024-03-11 PROCEDURE — 80053 COMPREHEN METABOLIC PANEL: CPT | Performed by: INTERNAL MEDICINE

## 2024-03-11 PROCEDURE — 84100 ASSAY OF PHOSPHORUS: CPT | Performed by: INTERNAL MEDICINE

## 2024-03-11 PROCEDURE — 99232 SBSQ HOSP IP/OBS MODERATE 35: CPT | Performed by: INTERNAL MEDICINE

## 2024-03-11 RX ADMIN — PROPRANOLOL HYDROCHLORIDE 60 MG: 10 TABLET ORAL at 08:31

## 2024-03-11 RX ADMIN — Medication 10 ML: at 08:32

## 2024-03-11 RX ADMIN — OXYCODONE 5 MG: 5 TABLET ORAL at 21:46

## 2024-03-11 RX ADMIN — PANCRELIPASE 12000 UNITS OF LIPASE: 30000; 6000; 19000 CAPSULE, DELAYED RELEASE PELLETS ORAL at 18:03

## 2024-03-11 RX ADMIN — RIVAROXABAN 20 MG: 20 TABLET, FILM COATED ORAL at 08:31

## 2024-03-11 RX ADMIN — OXYCODONE 5 MG: 5 TABLET ORAL at 10:59

## 2024-03-11 RX ADMIN — ATORVASTATIN CALCIUM 20 MG: 20 TABLET, FILM COATED ORAL at 08:31

## 2024-03-11 RX ADMIN — PANCRELIPASE 12000 UNITS OF LIPASE: 30000; 6000; 19000 CAPSULE, DELAYED RELEASE PELLETS ORAL at 08:31

## 2024-03-11 RX ADMIN — LEVETIRACETAM 500 MG: 500 TABLET, FILM COATED ORAL at 21:46

## 2024-03-11 RX ADMIN — Medication 10 ML: at 21:47

## 2024-03-11 RX ADMIN — FAMOTIDINE 40 MG: 20 TABLET ORAL at 08:31

## 2024-03-11 RX ADMIN — OXYCODONE 5 MG: 5 TABLET ORAL at 06:30

## 2024-03-11 RX ADMIN — PANCRELIPASE 12000 UNITS OF LIPASE: 30000; 6000; 19000 CAPSULE, DELAYED RELEASE PELLETS ORAL at 12:30

## 2024-03-11 RX ADMIN — LEVETIRACETAM 500 MG: 500 TABLET, FILM COATED ORAL at 08:32

## 2024-03-11 RX ADMIN — PROPRANOLOL HYDROCHLORIDE 60 MG: 10 TABLET ORAL at 21:46

## 2024-03-11 RX ADMIN — OXYCODONE 5 MG: 5 TABLET ORAL at 15:07

## 2024-03-11 RX ADMIN — SERTRALINE HYDROCHLORIDE 50 MG: 25 TABLET, FILM COATED ORAL at 08:31

## 2024-03-11 RX ADMIN — MONTELUKAST 10 MG: 10 TABLET, FILM COATED ORAL at 21:46

## 2024-03-11 RX ADMIN — FENOFIBRATE 145 MG: 145 TABLET, FILM COATED ORAL at 08:32

## 2024-03-11 NOTE — PROGRESS NOTES
[facility]   Hospitalist Progress Note  Date: 3/11/2024  Patient Name: Tram García  : 1985  MRN: [id number]  Date of admission: 3/8/2024    Subjective   Subjective     Chief Complaint:   Follow-up acute pancreatitis    Summary:   Tram García is a 38 y.o. female with past medical history of hypertension, hyperlipidemia, DVT with clotting disorder on Xarelto, anxiety, migraines, GERD was transferred to this facility from [facility] with acute pancreatitis.  Patient states that for the last 2 days she has been having abdominal pain that was intermittent, sharp, in the left upper quadrant, radiating to her back, sharp, 10 out of 10 at its worst, and made worse with eating.  Yesterday afternoon patient's abdominal pain became unbearable so she went to outside facility for further evaluation.  While there she was found to have a slightly elevated lipase of 108 with CT of the abdomen showing findings of mild pancreatitis.  There were no available inpatient beds at [place] with patient was transferred here for higher level of care.  At our facility patient's vitals were all within normal limits on arrival.  Her lipase level normalized and remaining labs were also unremarkable given her chronic conditions.  When asked she denied any recent fevers, chills, headaches, focal weakness, chest pain, palpitation, diarrhea, constipation, dysuria, hematuria, hematochezia, melena, or anxiety.  Patient admitted for further evaluation and treatment     Interval Followup:   Over the next 24 hours patient resting comfortably in bed, states her abdominal pain is improved, her LFTs remain slightly elevated, she is pending evaluation with ultrasound    Objective   Objective     Vitals:   Temp:  [97.7 °F (36.5 °C)-98.2 °F (36.8 °C)] 98.1 °F (36.7 °C)  Heart Rate:  [51-62] 51  Resp:  [16-20] 16  BP: (112-135)/(70-80) 112/70    Physical Exam   GEN: No acute distress  HEENT: Moist mucous membranes  LUNGS:  Equal chest rise bilaterally  CARDIAC: Regular rate and rhythm  NEURO: Moving all 4 extremities spontaneously  SKIN: No obvious breakdown    Result Review    Result Review:  I have personally reviewed the results as below  [x]  Laboratory CBC, CMP personally reviewed  CBC          3/9/2024    07:59 3/10/2024    05:25 3/11/2024    05:48   CBC   WBC 7.33  6.31  6.62    RBC 4.83  4.66  4.74    Hemoglobin 14.3  13.8  13.9    Hematocrit 43.6  42.5  42.9    MCV 90.3  91.2  90.5    MCH 29.6  29.6  29.3    MCHC 32.8  32.5  32.4    RDW 13.8  13.6  13.1    Platelets 178  156  180      CMP          3/9/2024    07:59 3/10/2024    05:25 3/11/2024    05:48   CMP   Glucose 94  88  91    BUN 10  8  9    Creatinine 0.75  0.70  0.87    EGFR 104.0  113.0  87.0    Sodium 137  135  141    Potassium 4.1  3.9  4.2    Chloride 106  107  104    Calcium 8.5  8.5  9.0    Total Protein 5.9  6.0  6.2    Albumin 3.6  3.5  3.8    Globulin 2.3  2.5  2.4    Total Bilirubin 0.8  0.7  0.7    Alkaline Phosphatase 65  65  65    AST (SGOT) 53  50  42    ALT (SGPT) 73  66  64    Albumin/Globulin Ratio 1.6  1.4  1.6    BUN/Creatinine Ratio 13.3  11.4  10.3    Anion Gap 8.6  12.7  9.0      []  Microbiology  []  Radiology  []  EKG/Telemetry   []  Cardiology/Vascular   []  Pathology  []  Old records  []  Other:    Assessment & Plan   Assessment / Plan     Assessment:  Pancreatitis  History of DVT  Migraine  Hypertension  GERD  Depression  Anemia    Plan:  Patient admitted to the hospital for further workup and management of above  Completed IV hydration  Continue to advance diet as tolerated  Continue oxycodone for pain  Continue as needed antiemetics  Continue Protonix  Continue pain control with IV morphine today, high risk medication, monitor for sedation  CBC, CMP reviewed 3/11/2024   Repeat CBC, CMP, mag and Phos in a.m. 3/11/2024   Monitor hemoglobin, likely irritation from multiple bowel movements  Continue home Creon  CT scan of the abdomen  personally reviewed, consistent with pancreatic stranding, no fluid collection identified  Discharge pending ultrasound of the abdomen     Reviewed patients labs and imaging, and discussed with patient and nurse at bedside.    DVT prophylaxis:  Medical DVT prophylaxis orders are present.        CODE STATUS:   Level Of Support Discussed With: Patient  Code Status (Patient has no pulse and is not breathing): CPR (Attempt to Resuscitate)  Medical Interventions (Patient has pulse or is breathing): Full Support      Electronically signed by Efren Sadler MD, 03/11/24, 10:49 AM EDT.

## 2024-03-11 NOTE — PLAN OF CARE
Goal Outcome Evaluation:   Patient a&ox4. Sleeping in between care. Up ad christ. Medicated for c/o upper abdominal quadrant pain prn per MAR.

## 2024-03-11 NOTE — PLAN OF CARE
Goal Outcome Evaluation:  Plan of Care Reviewed With: patient        Progress: no change  Outcome Evaluation: Pt remained A&Ox4 this shift. Also, pt remained on room air maintaining stable oxygen saturation. Pt was medicated with PRN Oxycodone to help achieve an acceptable pain tolerance level. Pt refused stool softeners this shift. Pt will undergo a US-Liver tomorrow. Therefore, pt will need to be NPO-with sips for meds after midnight. Order has been placed. All other vital signs remained stable.

## 2024-03-12 ENCOUNTER — APPOINTMENT (OUTPATIENT)
Dept: ULTRASOUND IMAGING | Facility: HOSPITAL | Age: 39
DRG: 439 | End: 2024-03-12
Payer: MEDICARE

## 2024-03-12 VITALS
OXYGEN SATURATION: 97 % | DIASTOLIC BLOOD PRESSURE: 84 MMHG | TEMPERATURE: 98.1 F | HEART RATE: 64 BPM | HEIGHT: 62 IN | WEIGHT: 223.33 LBS | RESPIRATION RATE: 16 BRPM | SYSTOLIC BLOOD PRESSURE: 134 MMHG | BODY MASS INDEX: 41.1 KG/M2

## 2024-03-12 LAB
ALBUMIN SERPL-MCNC: 3.9 G/DL (ref 3.5–5.2)
ALBUMIN/GLOB SERPL: 1.5 G/DL
ALP SERPL-CCNC: 67 U/L (ref 39–117)
ALT SERPL W P-5'-P-CCNC: 69 U/L (ref 1–33)
ANION GAP SERPL CALCULATED.3IONS-SCNC: 8.3 MMOL/L (ref 5–15)
AST SERPL-CCNC: 50 U/L (ref 1–32)
BILIRUB SERPL-MCNC: 1 MG/DL (ref 0–1.2)
BUN SERPL-MCNC: 10 MG/DL (ref 6–20)
BUN/CREAT SERPL: 11.5 (ref 7–25)
CALCIUM SPEC-SCNC: 9.2 MG/DL (ref 8.6–10.5)
CHLORIDE SERPL-SCNC: 100 MMOL/L (ref 98–107)
CO2 SERPL-SCNC: 28.7 MMOL/L (ref 22–29)
CREAT SERPL-MCNC: 0.87 MG/DL (ref 0.57–1)
EGFRCR SERPLBLD CKD-EPI 2021: 87 ML/MIN/1.73
GLOBULIN UR ELPH-MCNC: 2.6 GM/DL
GLUCOSE SERPL-MCNC: 91 MG/DL (ref 65–99)
POTASSIUM SERPL-SCNC: 3.9 MMOL/L (ref 3.5–5.2)
PROT SERPL-MCNC: 6.5 G/DL (ref 6–8.5)
SODIUM SERPL-SCNC: 137 MMOL/L (ref 136–145)

## 2024-03-12 PROCEDURE — 99239 HOSP IP/OBS DSCHRG MGMT >30: CPT | Performed by: INTERNAL MEDICINE

## 2024-03-12 PROCEDURE — 76705 ECHO EXAM OF ABDOMEN: CPT

## 2024-03-12 PROCEDURE — 80053 COMPREHEN METABOLIC PANEL: CPT | Performed by: INTERNAL MEDICINE

## 2024-03-12 RX ADMIN — RIVAROXABAN 20 MG: 20 TABLET, FILM COATED ORAL at 08:57

## 2024-03-12 RX ADMIN — PROPRANOLOL HYDROCHLORIDE 60 MG: 10 TABLET ORAL at 08:57

## 2024-03-12 RX ADMIN — FAMOTIDINE 40 MG: 20 TABLET ORAL at 08:56

## 2024-03-12 RX ADMIN — Medication 10 ML: at 08:57

## 2024-03-12 RX ADMIN — LEVETIRACETAM 500 MG: 500 TABLET, FILM COATED ORAL at 08:57

## 2024-03-12 RX ADMIN — PANCRELIPASE 12000 UNITS OF LIPASE: 30000; 6000; 19000 CAPSULE, DELAYED RELEASE PELLETS ORAL at 08:57

## 2024-03-12 RX ADMIN — FENOFIBRATE 145 MG: 145 TABLET, FILM COATED ORAL at 08:57

## 2024-03-12 RX ADMIN — SERTRALINE HYDROCHLORIDE 50 MG: 25 TABLET, FILM COATED ORAL at 08:57

## 2024-03-12 RX ADMIN — ATORVASTATIN CALCIUM 20 MG: 20 TABLET, FILM COATED ORAL at 08:57

## 2024-03-12 NOTE — PLAN OF CARE
Goal Outcome Evaluation:  Plan of Care Reviewed With: patient        Progress: no change  Outcome Evaluation: Pt is A&Ox4. Also, pt remained on room air maintaining stable oxygen saturation. Pt denied pain this shift. Pt underwent US-Liver this shift. Pt tolerated well. All other vital signs remained stable. Pt is to dc to home.

## 2024-03-12 NOTE — PLAN OF CARE
Goal Outcome Evaluation:           Progress: no change  Outcome Evaluation: patient is alert and oriented x4 and on room air. prn oxycodone given during shift for c/o abdominal pain. NPO since midnight for a US of liver scheduled for today. no other issues at this time.

## 2024-03-12 NOTE — DISCHARGE SUMMARY
[facility]         HOSPITALIST  DISCHARGE SUMMARY    Patient Name: Tram García  : 1985  MRN: [id number]    Date of Admission: 3/8/2024  Date of Discharge:  3/12/2024  Primary Care Physician: Phyllis Meyers APRN    Consults       No orders found from 2024 to 3/9/2024.            Active and Resolved Hospital Problems:  Pancreatitis  History of DVT  Migraine  Hypertension  GERD  Depression  Anemia    Hospital Course     Hospital Course:  Tram García is a 38 y.o. female with past medical history of hypertension, hyperlipidemia, DVT with clotting disorder on Xarelto, anxiety, migraines, GERD was transferred to this facility from [facility] with acute pancreatitis.  Patient states that for the last 2 days she has been having abdominal pain that was intermittent, sharp, in the left upper quadrant, radiating to her back, sharp, 10 out of 10 at its worst, and made worse with eating.  Yesterday afternoon patient's abdominal pain became unbearable so she went to outside facility for further evaluation.  While there she was found to have a slightly elevated lipase of 108 with CT of the abdomen showing findings of mild pancreatitis.  There were no available inpatient beds at [place] with patient was transferred here for higher level of care.  At our facility patient's vitals were all within normal limits on arrival.  Her lipase level normalized and remaining labs were also unremarkable given her chronic conditions.  When asked she denied any recent fevers, chills, headaches, focal weakness, chest pain, palpitation, diarrhea, constipation, dysuria, hematuria, hematochezia, melena, or anxiety.  Patient admitted for further evaluation and treatment.    Patient's LFTs were mildly elevated with elevated AST/ALT, ultrasound was obtained which was significant for an absent gallbladder, no bile duct dilation, fatty liver.  Patient's pain has improved, she is deemed stable for  discharge.  She is discharged home today, her statin is being held secondary to mildly elevated AST/ALT, she should follow-up with her PCP for restarting of this.  She is discharged home today in stable condition    Day of Discharge     Vital Signs:  Temp:  [97.7 °F (36.5 °C)-99 °F (37.2 °C)] 98.1 °F (36.7 °C)  Heart Rate:  [52-69] 64  Resp:  [16] 16  BP: (114-136)/(68-90) 134/84    Physical Exam:   GEN: No acute distress  HEENT: Moist mucous membranes  LUNGS: Equal chest rise bilaterally  CARDIAC: Regular rate and rhythm  NEURO: Moving all 4 extremities spontaneously  SKIN: No obvious breakdown    Discharge Details        Discharge Medications        Continue These Medications        Instructions Start Date   amitriptyline 25 MG tablet  Commonly known as: ELAVIL   Take 1 tablet by mouth Every Night.      Creon 74451-532178 units capsule delayed-release particles capsule  Generic drug: Pancrelipase (Lip-Prot-Amyl)   36,000 units of lipase, Oral, Take As Directed, Take 2 capsule with every meal and 1 capsule with snacks      famotidine 40 MG tablet  Commonly known as: PEPCID   1 tablet, Oral, Daily      fenofibrate 145 MG tablet  Commonly known as: TRICOR   145 mg, Oral      levETIRAcetam 500 MG tablet  Commonly known as: KEPPRA   500 mg, Oral, 2 Times Daily      montelukast 10 MG tablet  Commonly known as: SINGULAIR   10 mg, Oral, Nightly      naproxen 500 MG tablet  Commonly known as: NAPROSYN   1 tablet, Oral, Every 12 Hours PRN      propranolol 60 MG tablet  Commonly known as: INDERAL   Take 1 tablet by mouth 2 (Two) Times a Day.      rivaroxaban 20 MG tablet  Commonly known as: XARELTO   20 mg, Oral, Daily      sertraline 50 MG tablet  Commonly known as: ZOLOFT   1 tablet, Oral, Daily      vitamin D 1.25 MG (22495 UT) capsule capsule  Commonly known as: ERGOCALCIFEROL   50,000 Units, Oral, Weekly, Monday               Allergies   Allergen Reactions    Sumatriptan Shortness Of Breath    Amoxicillin Hives      Hives and blisters    Tylenol [Acetaminophen] Hives       Discharge Disposition:  Home or Self Care    Diet:  Hospital:  Diet Order   Procedures    Diet: Gastrointestinal; Fiber-Restricted, Low Irritant; Texture: Regular (IDDSI 7); Fluid Consistency: Thin (IDDSI 0)       Discharge Activity:       CODE STATUS:  Code Status and Medical Interventions:   Ordered at: 03/08/24 0215     Level Of Support Discussed With:    Patient     Code Status (Patient has no pulse and is not breathing):    CPR (Attempt to Resuscitate)     Medical Interventions (Patient has pulse or is breathing):    Full Support       No future appointments.    Additional Instructions for the Follow-ups that You Need to Schedule       Discharge Follow-up with PCP   As directed       Currently Documented PCP:    Phyllis Meyers APRN    PCP Phone Number:    [phone]     Follow Up Details: 3 to 7 days                Pertinent  and/or Most Recent Results     IMAGING:  US Liver    Result Date: 3/12/2024  PROCEDURE: US LIVER  COMPARISON: None  INDICATIONS: abnormal lfts, pancreatitis  FINDINGS:  Visualized pancreas unremarkable.  No evident peripancreatic fluid collection.  No pancreatic duct dilatation  Diffuse increased echogenicity of the liver.  No intrahepatic biliary duct dilatation.  Patent interrogated hepatic and portal veins  Gallbladder surgically absent.  Common duct normal at 3 mm  Right kidney normal in echogenicity with no hydronephrosis         1. Hepatic steatosis  2. Status post cholecystectomy with no evidence of biliary obstruction  3. Unremarkable visualized pancreas, with no abnormal peripancreatic fluid collection or evidence of pancreatic duct obstruction  4. Normal ultrasound appearance of the right kidney      CLAUDIO GARIBAY MD       Electronically Signed and Approved By: CLAUDIO GARIBAY MD on 3/12/2024 at 7:47             XR Chest 1 View    Result Date: 3/8/2024  PROCEDURE: XR CHEST 1 VW  COMPARISON: [facility],  CR, CHEST PA/AP & LAT 2V, 1/16/2019, 17:54.  INDICATIONS: SHORT OF BREATH  FINDINGS:  Cardiac and mediastinal contours are normal.  Pulmonary vascularity is normal.  The lungs are clear.  No pneumothorax.       No acute cardiopulmonary findings.       MICHELLE LOPEZ MD       Electronically Signed and Approved By: MICHELLE LOPEZ MD on 3/08/2024 at 5:45             CT Abdomen Pelvis With Contrast    Result Date: 3/7/2024  REPORT-ID:[id number] EXAM:  CT ABDOMEN AND PELVIS WITH INTRAVENOUS CONTRAST CLINICAL INDICATION:  Pancreatitis, acute, severe TECHNIQUE:  Helically acquired images were obtained of the abdomen and pelvis with intravenous contrast.  This CT exam was performed using one or more of the following dose reduction techniques:  automated exposure control, adjustment of the mA and/or kV according to patient size, and/or use of iterative reconstruction technique. CONTRAST:  IV Optiray 320 100 COMPARISON:  8 September 2023 FINDINGS: LOWER THORAX:  Lung bases are clear. No pleural effusions. ABDOMEN: LIVER:  Liver parenchyma is normal without lesions or cirrhosis. GALLBLADDER AND BILE DUCTS:  The gallbladder is surgically removed. There is no biliary dilation. PANCREAS:  There is mild diffuse pancreatitis without fluid collections or parenchymal necrosis. SPLEEN:  Normal size without focal cystic or solid mass. ADRENALS:  Normal without nodules. KIDNEYS AND URETERS:  Normal renal size and position.  No hydronephrosis. STOMACH AND BOWEL:  There is no bowel obstruction or earnest-enteric inflammation. PELVIS: APPENDIX:  No findings of appendicitis. BLADDER:  Normal. REPRODUCTIVE:  No mass.  ABDOMEN and PELVIS: INTRAPERITONEAL SPACE:  No ascites or other fluid collection.  No free air. BONES/JOINTS:  No suspicious lytic or blastic abnormality. SOFT TISSUES:  No abdominal hernias. No soft tissue masses or fluid collections. VASCULATURE:  Aorta is normal caliber. LYMPH NODES:  No enlarged lymph  nodes.    Mild pancreatitis, no fluid collection. Electronically Signed: Nikolay Martinez MD 2024/03/07 at 19:43 CST Reading Location ID and State: [address] / [place] Tel [phone], Service support  [phone], Fax [phone]    XR Shoulder 2+ View Right    Result Date: 2/19/2024  REPORT-ID:[id number] STUDY:   X-RAY - RIGHT SHOULDER REASON FOR EXAM:   Female, 38 years old.  SHOULDER PAIN; Right shoulder pain x 2 days TECHNIQUE:   3 view(s) of the shoulder. COMPARISON:   None. ___________________________________ FINDINGS: Normal glenohumeral articulation.  Normal acromioclavicular joint.  Normal acromion. Normal humeral head and visualized proximal humerus. The soft tissue structures are unremarkable. Normal visualized pulmonary apex. ___________________________________    Normal x-ray examination of the shoulder. Electronically Signed: Stevenson Hitchcock MD 2024/02/19 at 15:30 CST Reading Location ID and State: [address] / [place] Tel [phone], Service support  [phone], Fax [phone]      LAB RESULTS:      Lab 03/11/24  0548 03/10/24  0525 03/09/24  0759 03/08/24  0226   WBC 6.62 6.31 7.33 9.86   HEMOGLOBIN 13.9 13.8 14.3 15.7   HEMATOCRIT 42.9 42.5 43.6 47.3*   PLATELETS 180 156 178 213   NEUTROS ABS 2.92 2.94 3.38 4.36   IMMATURE GRANS (ABS) 0.02 0.02 0.02 0.04   LYMPHS ABS 2.59 2.36 2.94 3.98*   MONOS ABS 0.65 0.55 0.56 0.87   EOS ABS 0.40 0.41* 0.38 0.53*   MCV 90.5 91.2 90.3 89.2   LACTATE  --   --   --  1.4         Lab 03/11/24  0548 03/10/24  0525 03/09/24  0759 03/08/24 0226   SODIUM 141 135* 137 138   POTASSIUM 4.2 3.9 4.1 4.0   CHLORIDE 104 107 106 107   CO2 28.0 15.3* 22.4 19.0*   ANION GAP 9.0 12.7 8.6 12.0   BUN 9 8 10 11   CREATININE 0.87 0.70 0.75 0.62   EGFR 87.0 113.0 104.0 116.3   GLUCOSE 91 88 94 104*   CALCIUM 9.0 8.5* 8.5* 8.9   MAGNESIUM 1.7 1.6  --   --    PHOSPHORUS 4.1 3.2  --   --          Lab 03/11/24  0548 03/10/24  0525 03/09/24  0759 03/08/24 0226   TOTAL PROTEIN  6.2 6.0 5.9* 6.6   ALBUMIN 3.8 3.5 3.6 4.0   GLOBULIN 2.4 2.5 2.3 2.6   ALT (SGPT) 64* 66* 73* 79*   AST (SGOT) 42* 50* 53* 61*   BILIRUBIN 0.7 0.7 0.8 0.6   ALK PHOS 65 65 65 80   LIPASE  --   --   --  27                     Brief Urine Lab Results  (Last result in the past 365 days)        Color   Clarity   Blood   Leuk Est   Nitrite   Protein   CREAT   Urine HCG        03/10/24 0937 Yellow   Clear   Negative   Negative   Negative   Negative                 Microbiology Results (last 10 days)       ** No results found for the last 240 hours. **                  Time spent on Discharge including face to face service: Greater than 30 minutes      Electronically signed by Efren Sadler MD, 03/12/24, 8:59 AM EDT.

## 2024-03-13 ENCOUNTER — READMISSION MANAGEMENT (OUTPATIENT)
Dept: CALL CENTER | Facility: HOSPITAL | Age: 39
End: 2024-03-13
Payer: MEDICARE

## 2024-03-13 NOTE — OUTREACH NOTE
Prep Survey      Flowsheet Row Responses   [facility] facility patient discharged from? [facility]   Is LACE score < 7 ? No   Eligibility Readm Mgmt   Discharge diagnosis Pancreatitis   Does the patient have one of the following disease processes/diagnoses(primary or secondary)? Other   Does the patient have Home health ordered? No   Is there a DME ordered? No   Medication alerts for this patient see avs   Prep survey completed? Yes            Delma MORRISON - Registered Nurse

## 2024-03-19 ENCOUNTER — READMISSION MANAGEMENT (OUTPATIENT)
Dept: CALL CENTER | Facility: HOSPITAL | Age: 39
End: 2024-03-19
Payer: MEDICARE

## 2024-03-19 ENCOUNTER — HOSPITAL ENCOUNTER (INPATIENT)
Facility: HOSPITAL | Age: 39
LOS: 3 days | Discharge: HOME | DRG: 439 | End: 2024-03-23
Attending: EMERGENCY MEDICINE | Admitting: INTERNAL MEDICINE
Payer: MEDICARE

## 2024-03-19 ENCOUNTER — APPOINTMENT (OUTPATIENT)
Dept: CT IMAGING | Facility: HOSPITAL | Age: 39
DRG: 439 | End: 2024-03-19
Payer: MEDICARE

## 2024-03-19 DIAGNOSIS — R10.13 EPIGASTRIC PAIN: ICD-10-CM

## 2024-03-19 DIAGNOSIS — K85.80 OTHER ACUTE PANCREATITIS, UNSPECIFIED COMPLICATION STATUS: Primary | ICD-10-CM

## 2024-03-19 DIAGNOSIS — R12 HEARTBURN: ICD-10-CM

## 2024-03-19 DIAGNOSIS — K21.9 GASTROESOPHAGEAL REFLUX DISEASE WITHOUT ESOPHAGITIS: ICD-10-CM

## 2024-03-19 DIAGNOSIS — K86.1 ACUTE ON CHRONIC PANCREATITIS: ICD-10-CM

## 2024-03-19 DIAGNOSIS — K85.90 ACUTE ON CHRONIC PANCREATITIS: ICD-10-CM

## 2024-03-19 LAB
ALBUMIN SERPL-MCNC: 4.5 G/DL (ref 3.5–5.2)
ALBUMIN/GLOB SERPL: 1.5 G/DL
ALP SERPL-CCNC: 88 U/L (ref 39–117)
ALT SERPL W P-5'-P-CCNC: 84 U/L (ref 1–33)
ANION GAP SERPL CALCULATED.3IONS-SCNC: 10.3 MMOL/L (ref 5–15)
AST SERPL-CCNC: 52 U/L (ref 1–32)
BASOPHILS # BLD AUTO: 0.04 10*3/MM3 (ref 0–0.2)
BASOPHILS NFR BLD AUTO: 0.4 % (ref 0–1.5)
BILIRUB SERPL-MCNC: 0.4 MG/DL (ref 0–1.2)
BILIRUB UR QL STRIP: NEGATIVE
BUN SERPL-MCNC: 10 MG/DL (ref 6–20)
BUN/CREAT SERPL: 13.2 (ref 7–25)
CALCIUM SPEC-SCNC: 9.7 MG/DL (ref 8.6–10.5)
CHLORIDE SERPL-SCNC: 103 MMOL/L (ref 98–107)
CLARITY UR: ABNORMAL
CO2 SERPL-SCNC: 24.7 MMOL/L (ref 22–29)
COLOR UR: YELLOW
CREAT SERPL-MCNC: 0.76 MG/DL (ref 0.57–1)
DEPRECATED RDW RBC AUTO: 46.2 FL (ref 37–54)
EGFRCR SERPLBLD CKD-EPI 2021: 102.4 ML/MIN/1.73
EOSINOPHIL # BLD AUTO: 0.36 10*3/MM3 (ref 0–0.4)
EOSINOPHIL NFR BLD AUTO: 3.9 % (ref 0.3–6.2)
ERYTHROCYTE [DISTWIDTH] IN BLOOD BY AUTOMATED COUNT: 13.8 % (ref 12.3–15.4)
GLOBULIN UR ELPH-MCNC: 3 GM/DL
GLUCOSE SERPL-MCNC: 116 MG/DL (ref 65–99)
GLUCOSE UR STRIP-MCNC: NEGATIVE MG/DL
HCG INTACT+B SERPL-ACNC: <0.5 MIU/ML
HCT VFR BLD AUTO: 50.3 % (ref 34–46.6)
HGB BLD-MCNC: 16.8 G/DL (ref 12–15.9)
HGB UR QL STRIP.AUTO: NEGATIVE
HOLD SPECIMEN: NORMAL
HOLD SPECIMEN: NORMAL
IMM GRANULOCYTES # BLD AUTO: 0.02 10*3/MM3 (ref 0–0.05)
IMM GRANULOCYTES NFR BLD AUTO: 0.2 % (ref 0–0.5)
KETONES UR QL STRIP: NEGATIVE
LEUKOCYTE ESTERASE UR QL STRIP.AUTO: NEGATIVE
LIPASE SERPL-CCNC: 33 U/L (ref 13–60)
LYMPHOCYTES # BLD AUTO: 4.01 10*3/MM3 (ref 0.7–3.1)
LYMPHOCYTES NFR BLD AUTO: 43.1 % (ref 19.6–45.3)
MCH RBC QN AUTO: 30.1 PG (ref 26.6–33)
MCHC RBC AUTO-ENTMCNC: 33.4 G/DL (ref 31.5–35.7)
MCV RBC AUTO: 90 FL (ref 79–97)
MONOCYTES # BLD AUTO: 0.74 10*3/MM3 (ref 0.1–0.9)
MONOCYTES NFR BLD AUTO: 7.9 % (ref 5–12)
NEUTROPHILS NFR BLD AUTO: 4.14 10*3/MM3 (ref 1.7–7)
NEUTROPHILS NFR BLD AUTO: 44.5 % (ref 42.7–76)
NITRITE UR QL STRIP: NEGATIVE
NRBC BLD AUTO-RTO: 0 /100 WBC (ref 0–0.2)
PH UR STRIP.AUTO: 6 [PH] (ref 5–8)
PLATELET # BLD AUTO: 346 10*3/MM3 (ref 140–450)
PMV BLD AUTO: 10.1 FL (ref 6–12)
POTASSIUM SERPL-SCNC: 4.9 MMOL/L (ref 3.5–5.2)
PROT SERPL-MCNC: 7.5 G/DL (ref 6–8.5)
PROT UR QL STRIP: NEGATIVE
RBC # BLD AUTO: 5.59 10*6/MM3 (ref 3.77–5.28)
SODIUM SERPL-SCNC: 138 MMOL/L (ref 136–145)
SP GR UR STRIP: 1.02 (ref 1–1.03)
UROBILINOGEN UR QL STRIP: ABNORMAL
WBC NRBC COR # BLD AUTO: 9.31 10*3/MM3 (ref 3.4–10.8)
WHOLE BLOOD HOLD COAG: NORMAL
WHOLE BLOOD HOLD SPECIMEN: NORMAL

## 2024-03-19 PROCEDURE — 25810000003 LACTATED RINGERS SOLUTION: Performed by: INTERNAL MEDICINE

## 2024-03-19 PROCEDURE — 36415 COLL VENOUS BLD VENIPUNCTURE: CPT

## 2024-03-19 PROCEDURE — 81003 URINALYSIS AUTO W/O SCOPE: CPT | Performed by: EMERGENCY MEDICINE

## 2024-03-19 PROCEDURE — 25010000002 ONDANSETRON PER 1 MG: Performed by: EMERGENCY MEDICINE

## 2024-03-19 PROCEDURE — 74177 CT ABD & PELVIS W/CONTRAST: CPT

## 2024-03-19 PROCEDURE — 80053 COMPREHEN METABOLIC PANEL: CPT | Performed by: EMERGENCY MEDICINE

## 2024-03-19 PROCEDURE — 25010000002 HYDROMORPHONE 1 MG/ML SOLUTION: Performed by: EMERGENCY MEDICINE

## 2024-03-19 PROCEDURE — 25510000001 IOPAMIDOL PER 1 ML: Performed by: EMERGENCY MEDICINE

## 2024-03-19 PROCEDURE — 99223 1ST HOSP IP/OBS HIGH 75: CPT | Performed by: INTERNAL MEDICINE

## 2024-03-19 PROCEDURE — 84702 CHORIONIC GONADOTROPIN TEST: CPT | Performed by: EMERGENCY MEDICINE

## 2024-03-19 PROCEDURE — 99285 EMERGENCY DEPT VISIT HI MDM: CPT

## 2024-03-19 PROCEDURE — 25810000003 SODIUM CHLORIDE 0.9 % SOLUTION: Performed by: EMERGENCY MEDICINE

## 2024-03-19 PROCEDURE — 85025 COMPLETE CBC W/AUTO DIFF WBC: CPT | Performed by: EMERGENCY MEDICINE

## 2024-03-19 PROCEDURE — 83690 ASSAY OF LIPASE: CPT | Performed by: EMERGENCY MEDICINE

## 2024-03-19 PROCEDURE — 25010000002 MORPHINE PER 10 MG: Performed by: EMERGENCY MEDICINE

## 2024-03-19 PROCEDURE — G0378 HOSPITAL OBSERVATION PER HR: HCPCS

## 2024-03-19 PROCEDURE — 25810000003 SODIUM CHLORIDE 0.9 % SOLUTION: Performed by: INTERNAL MEDICINE

## 2024-03-19 RX ORDER — SODIUM CHLORIDE 0.9 % (FLUSH) 0.9 %
10 SYRINGE (ML) INJECTION AS NEEDED
Status: DISCONTINUED | OUTPATIENT
Start: 2024-03-19 | End: 2024-03-23 | Stop reason: HOSPADM

## 2024-03-19 RX ORDER — ONDANSETRON 2 MG/ML
4 INJECTION INTRAMUSCULAR; INTRAVENOUS EVERY 6 HOURS PRN
Status: DISCONTINUED | OUTPATIENT
Start: 2024-03-19 | End: 2024-03-23 | Stop reason: HOSPADM

## 2024-03-19 RX ORDER — ONDANSETRON 2 MG/ML
4 INJECTION INTRAMUSCULAR; INTRAVENOUS ONCE
Status: COMPLETED | OUTPATIENT
Start: 2024-03-19 | End: 2024-03-19

## 2024-03-19 RX ORDER — SODIUM CHLORIDE 0.9 % (FLUSH) 0.9 %
10 SYRINGE (ML) INJECTION EVERY 12 HOURS SCHEDULED
Status: DISCONTINUED | OUTPATIENT
Start: 2024-03-19 | End: 2024-03-23 | Stop reason: HOSPADM

## 2024-03-19 RX ORDER — SODIUM CHLORIDE 9 MG/ML
40 INJECTION, SOLUTION INTRAVENOUS AS NEEDED
Status: DISCONTINUED | OUTPATIENT
Start: 2024-03-19 | End: 2024-03-23 | Stop reason: HOSPADM

## 2024-03-19 RX ORDER — SODIUM CHLORIDE 9 MG/ML
200 INJECTION, SOLUTION INTRAVENOUS CONTINUOUS
Status: DISCONTINUED | OUTPATIENT
Start: 2024-03-19 | End: 2024-03-20

## 2024-03-19 RX ORDER — NALOXONE HCL 0.4 MG/ML
0.4 VIAL (ML) INJECTION
Status: DISCONTINUED | OUTPATIENT
Start: 2024-03-19 | End: 2024-03-23

## 2024-03-19 RX ADMIN — Medication 10 ML: at 23:58

## 2024-03-19 RX ADMIN — HYDROMORPHONE HYDROCHLORIDE 1 MG: 1 INJECTION, SOLUTION INTRAMUSCULAR; INTRAVENOUS; SUBCUTANEOUS at 21:15

## 2024-03-19 RX ADMIN — SODIUM CHLORIDE, POTASSIUM CHLORIDE, SODIUM LACTATE AND CALCIUM CHLORIDE 2000 ML: 600; 310; 30; 20 INJECTION, SOLUTION INTRAVENOUS at 22:03

## 2024-03-19 RX ADMIN — SODIUM CHLORIDE 200 ML/HR: 9 INJECTION, SOLUTION INTRAVENOUS at 23:58

## 2024-03-19 RX ADMIN — SODIUM CHLORIDE 1000 ML: 9 INJECTION, SOLUTION INTRAVENOUS at 19:38

## 2024-03-19 RX ADMIN — ONDANSETRON 4 MG: 2 INJECTION INTRAMUSCULAR; INTRAVENOUS at 19:33

## 2024-03-19 RX ADMIN — IOPAMIDOL 100 ML: 755 INJECTION, SOLUTION INTRAVENOUS at 19:47

## 2024-03-19 RX ADMIN — MORPHINE SULFATE 4 MG: 4 INJECTION, SOLUTION INTRAMUSCULAR; INTRAVENOUS at 19:37

## 2024-03-19 NOTE — ED PROVIDER NOTES
Time: 7:19 PM EDT  Date of encounter:  3/19/2024  Independent Historian/Clinical History and Information was obtained by:   Patient    History is limited by: N/A    Chief Complaint   Patient presents with    Abdominal Pain    Vomiting         History of Present Illness:  Patient is a 38 y.o. year old female who presents to the emergency department for evaluation of right upper quadrant radiating to left upper quadrant abdominal pain that started yesterday.  Patient states the pain is worse and that brought her in today.  Patient has a history of pancreatitis and was recently admitted at [facility] for pancreatitis.  She admits to nausea and vomiting.  She states she is unable to eat any food.  Denies history of alcohol, diabetes, hyperlipidemia.  Had a prior cholecystectomy.  Denies diarrhea, dysuria and hematuria.    Patient Care Team  Primary Care Provider: Phyllis Meyers APRN    Past Medical History:     Allergies   Allergen Reactions    Sumatriptan Shortness Of Breath    Amoxicillin Hives     Hives and blisters    Tylenol [Acetaminophen] Hives     Past Medical History:   Diagnosis Date    Abnormal ECG     Acid reflux     Anemia     Anxiety 2014    Asthma     Bleeding disorder     Cholelithiasis     Clotting disorder     Deep vein thrombosis 2018    Depression     Foot sprain, right, initial encounter 10/21/2015    GERD (gastroesophageal reflux disease)     HTN (hypertension)     gestational    Migraine headache     Pancreatitis 2022    [facility]    Pineal gland cyst 2014    Renal cyst 2014     Past Surgical History:   Procedure Laterality Date     SECTION      CHOLECYSTECTOMY      COLONOSCOPY N/A 2023    Procedure: COLONOSCOPY WITH BIOPSIES;  Surgeon: Feliz Olguin MD;  Location: [facility] ENDOSCOPY;  Service: Gastroenterology;  Laterality: N/A;  NORMAL COLONOSCOPY    ENDOSCOPY N/A 10/22/2021    Procedure: ESOPHAGOGASTRODUODENOSCOPY WITH BIOPSIES;  Surgeon:  Feliz Olguin MD;  Location: [facility] ENDOSCOPY;  Service: Gastroenterology;  Laterality: N/A;  NORMAL EGD    ENDOSCOPY N/A 06/02/2023    Procedure: ESOPHAGOGASTRODUODENOSCOPY WITH BIOPSIES;  Surgeon: Feliz Olguin MD;  Location: [facility] ENDOSCOPY;  Service: Gastroenterology;  Laterality: N/A;  HIATAL HERNIA    LASER ABLATION      TUBAL ABDOMINAL LIGATION  03/27/2012     Family History   Problem Relation Age of Onset    Diabetes Mother     Arthritis Mother     Anxiety disorder Mother     Hyperlipidemia Mother     Liver disease Mother     Stroke Mother     Thyroid disease Mother     Other Father         cardio vascular disease    Diabetes Father     Kidney failure Father     Arthritis Father     Colon cancer Neg Hx        Home Medications:  Prior to Admission medications    Medication Sig Start Date End Date Taking? Authorizing Provider   amitriptyline (ELAVIL) 25 MG tablet Take 1 tablet by mouth Every Night. 9/15/23   Khari Mar MD   famotidine (PEPCID) 40 MG tablet Take 1 tablet by mouth Daily. 11/2/23   Khari Mar MD   fenofibrate (TRICOR) 145 MG tablet Take 1 tablet by mouth. 11/28/23   Khari Mar MD   levETIRAcetam (KEPPRA) 500 MG tablet Take 1 tablet by mouth 2 (Two) Times a Day.    Khari Mar MD   montelukast (SINGULAIR) 10 MG tablet Take 1 tablet by mouth Every Night.    Khari Mar MD   naproxen (NAPROSYN) 500 MG tablet Take 1 tablet by mouth Every 12 (Twelve) Hours As Needed for Mild Pain or Headache. 1/3/23   Khari Mar MD   Pancrelipase, Lip-Prot-Amyl, (Creon) 78327-044278 units capsule delayed-release particles capsule Take 1 capsule by mouth Take As Directed. Take 2 capsule with every meal and 1 capsule with snacks 10/20/23   Thao Valles APRN   propranolol (INDERAL) 60 MG tablet Take 1 tablet by mouth 2 (Two) Times a Day. 9/15/23   Khari Mar MD   rivaroxaban (XARELTO) 20 MG tablet Take 1 tablet by mouth  "Daily.    Khari Mar MD   sertraline (ZOLOFT) 50 MG tablet Take 1 tablet by mouth Daily. 1/3/23   Khari Mar MD   vitamin D (ERGOCALCIFEROL) 1.25 MG (66282 UT) capsule capsule Take 1 capsule by mouth 1 (One) Time Per Week. Monday    Khari Mar MD        Social History:   Social History     Tobacco Use    Smoking status: Former     Current packs/day: 0.00     Average packs/day: 1 pack/day for 15.0 years (15.0 ttl pk-yrs)     Types: Cigarettes     Start date: 2007     Quit date: 2022     Years since quittin.3     Passive exposure: Past    Smokeless tobacco: Never   Vaping Use    Vaping status: Never Used   Substance Use Topics    Alcohol use: Never    Drug use: Never         Review of Systems:  Review of Systems   Constitutional: Negative.    HENT: Negative.     Eyes: Negative.    Respiratory: Negative.     Cardiovascular: Negative.    Gastrointestinal:  Positive for abdominal pain, nausea and vomiting.   Endocrine: Negative.    Genitourinary: Negative.    Musculoskeletal: Negative.    Skin: Negative.    Allergic/Immunologic: Negative.    Neurological: Negative.    Hematological: Negative.    Psychiatric/Behavioral: Negative.          Physical Exam:  /69 (BP Location: Left arm, Patient Position: Lying)   Pulse 64   Temp 97.8 °F (36.6 °C) (Oral)   Resp 18   Ht 157.5 cm (62\")   Wt 99.7 kg (219 lb 12.8 oz)   SpO2 97%   Breastfeeding No   BMI 40.20 kg/m²         Physical Exam  Vitals and nursing note reviewed.   Constitutional:       Appearance: Normal appearance.   HENT:      Head: Normocephalic and atraumatic.      Nose: Nose normal.      Mouth/Throat:      Mouth: Mucous membranes are moist.   Eyes:      Extraocular Movements: Extraocular movements intact.      Conjunctiva/sclera: Conjunctivae normal.      Pupils: Pupils are equal, round, and reactive to light.   Cardiovascular:      Rate and Rhythm: Normal rate and regular rhythm.      Heart sounds: Normal heart " sounds.   Pulmonary:      Effort: Pulmonary effort is normal.      Breath sounds: Normal breath sounds.   Abdominal:      General: Abdomen is flat. Bowel sounds are normal.      Palpations: Abdomen is soft.      Tenderness: There is abdominal tenderness. There is guarding. There is no right CVA tenderness, left CVA tenderness or rebound.   Musculoskeletal:         General: Normal range of motion.      Cervical back: Normal range of motion and neck supple.   Skin:     General: Skin is warm and dry.   Neurological:      General: No focal deficit present.      Mental Status: She is alert and oriented to person, place, and time.   Psychiatric:         Mood and Affect: Mood normal.         Behavior: Behavior normal.                  Procedures:  Procedures      Medical Decision Making:      Comorbidities that affect care:    Pancreatitis, Asthma, Hypertension    External Notes reviewed:    Previous Admission Note: Admission for pancreatitis on 3/8/2024      The following orders were placed and all results were independently analyzed by me:  Orders Placed This Encounter   Procedures    CT Abdomen Pelvis With Contrast    [facility] Draw    Comprehensive Metabolic Panel    Lipase    Urinalysis With Microscopic If Indicated (No Culture) - Urine, Clean Catch    hCG, Quantitative, Pregnancy    CBC Auto Differential    NPO Diet NPO Type: Strict NPO    Undress & Gown    Inpatient Hospitalist Consult    Insert Peripheral IV    Initiate Observation Status    CBC & Differential    Green Top (Gel)    Lavender Top    Gold Top - SST    Light Blue Top       Medications Given in the Emergency Department:  Medications   sodium chloride 0.9 % flush 10 mL (has no administration in time range)   lactated ringers bolus 2,000 mL (has no administration in time range)   ondansetron (ZOFRAN) injection 4 mg (4 mg Intravenous Given 3/19/24 1933)   morphine injection 4 mg (4 mg Intravenous Given 3/19/24 1937)   sodium chloride 0.9 % bolus 1,000 mL (0  mL Intravenous Stopped 3/19/24 2117)   iopamidol (ISOVUE-370) 76 % injection 100 mL (100 mL Intravenous Given 3/19/24 1947)   HYDROmorphone (DILAUDID) injection 1 mg (1 mg Intravenous Given 3/19/24 2115)        ED Course:    The patient was initially evaluated in the triage area where orders were placed. The patient was later dispositioned by Dany Singh PA-C.      The patient was advised to stay for completion of workup which includes but is not limited to communication of labs and radiological results, reassessment and plan. The patient was advised that leaving prior to disposition by a provider could result in critical findings that are not communicated to the patient.     ED Course as of 03/19/24 2121   Tue Mar 19, 2024   2040 Patient states her pain is a 9 after morphine.  Will consult with hospitalist for admission for pancreatitis and intractable pain [AJ]   2059 Consulted with hospitalist Dr. Del Rio, he will come evaluate the patient [AJ]      ED Course User Index  [AJ] Dany Singh PA-C       Labs:    Lab Results (last 24 hours)       Procedure Component Value Units Date/Time    CBC & Differential [[id number]]  (Abnormal) Collected: 03/19/24 1834    Specimen: Blood from Arm, Right Updated: 03/19/24 1857    Narrative:      The following orders were created for panel order CBC & Differential.  Procedure                               Abnormality         Status                     ---------                               -----------         ------                     CBC Auto Differential[[id number]]        Abnormal            Final result                 Please view results for these tests on the individual orders.    Comprehensive Metabolic Panel [[id number]]  (Abnormal) Collected: 03/19/24 1834    Specimen: Blood from Arm, Right Updated: 03/19/24 1922     Glucose 116 mg/dL      BUN 10 mg/dL      Creatinine 0.76 mg/dL      Sodium 138 mmol/L      Potassium 4.9 mmol/L      Chloride 103 mmol/L       CO2 24.7 mmol/L      Calcium 9.7 mg/dL      Total Protein 7.5 g/dL      Albumin 4.5 g/dL      ALT (SGPT) 84 U/L      AST (SGOT) 52 U/L      Alkaline Phosphatase 88 U/L      Total Bilirubin 0.4 mg/dL      Globulin 3.0 gm/dL      A/G Ratio 1.5 g/dL      BUN/Creatinine Ratio 13.2     Anion Gap 10.3 mmol/L      eGFR 102.4 mL/min/1.73     Narrative:      GFR Normal >60  Chronic Kidney Disease <60  Kidney Failure <15      Lipase [[id number]]  (Normal) Collected: 03/19/24 1834    Specimen: Blood from Arm, Right Updated: 03/19/24 1922     Lipase 33 U/L     hCG, Quantitative, Pregnancy [[id number]] Collected: 03/19/24 1834    Specimen: Blood from Arm, Right Updated: 03/19/24 1913     HCG Quantitative <0.50 mIU/mL     Narrative:      HCG Ranges by Gestational Age    Females - non-pregnant premenopausal   </= 1mIU/mL HCG  Females - postmenopausal               </= 7mIU/mL HCG    3 Weeks       5.4   -      72 mIU/mL  4 Weeks      10.2   -     708 mIU/mL  5 Weeks       217   -   8,245 mIU/mL  6 Weeks       152   -  32,177 mIU/mL  7 Weeks     4,059   - 153,767 mIU/mL  8 Weeks    31,366   - 149,094 mIU/mL  9 Weeks    59,109   - 135,901 mIU/mL  10 Weeks   44,186   - 170,409 mIU/mL  12 Weeks   27,107   - 201,615 mIU/mL  14 Weeks   24,302   -  93,646 mIU/mL  15 Weeks   12,540   -  69,747 mIU/mL  16 Weeks    8,904   -  55,332 mIU/mL  17 Weeks    8,240   -  51,793 mIU/mL  18 Weeks    9,649   -  55,271 mIU/mL      CBC Auto Differential [[id number]]  (Abnormal) Collected: 03/19/24 1834    Specimen: Blood from Arm, Right Updated: 03/19/24 1857     WBC 9.31 10*3/mm3      RBC 5.59 10*6/mm3      Hemoglobin 16.8 g/dL      Hematocrit 50.3 %      MCV 90.0 fL      MCH 30.1 pg      MCHC 33.4 g/dL      RDW 13.8 %      RDW-SD 46.2 fl      MPV 10.1 fL      Platelets 346 10*3/mm3      Neutrophil % 44.5 %      Lymphocyte % 43.1 %      Monocyte % 7.9 %      Eosinophil % 3.9 %      Basophil % 0.4 %      Immature Grans % 0.2 %      Neutrophils, Absolute  4.14 10*3/mm3      Lymphocytes, Absolute 4.01 10*3/mm3      Monocytes, Absolute 0.74 10*3/mm3      Eosinophils, Absolute 0.36 10*3/mm3      Basophils, Absolute 0.04 10*3/mm3      Immature Grans, Absolute 0.02 10*3/mm3      nRBC 0.0 /100 WBC     Urinalysis With Microscopic If Indicated (No Culture) - Urine, Clean Catch [[id number]]  (Abnormal) Collected: 03/19/24 1840    Specimen: Urine, Clean Catch Updated: 03/19/24 1856     Color, UA Yellow     Appearance, UA Cloudy     pH, UA 6.0     Specific Gravity, UA 1.022     Glucose, UA Negative     Ketones, UA Negative     Bilirubin, UA Negative     Blood, UA Negative     Protein, UA Negative     Leuk Esterase, UA Negative     Nitrite, UA Negative     Urobilinogen, UA 1.0 E.U./dL    Narrative:      Urine microscopic not indicated.             Imaging:    CT Abdomen Pelvis With Contrast    Result Date: 3/19/2024  CT ABDOMEN PELVIS W CONTRAST-  Date of Exam: 3/19/2024 7:34 PM  Indication: Nausea/vomiting.  Comparison: CT abdomen pelvis 3/7/2024  Technique: Axial CT images were obtained of the abdomen and pelvis following the uneventful intravenous administration of 100 mL Isovue-370. Reconstructed coronal and sagittal images were also obtained. Automated exposure control and iterative construction methods were used.   Findings: Linear areas of bandlike atelectasis versus parenchymal scar. Heart size normal.  Marked diffuse hepatic steatosis. No suspicious liver lesion. Cholecystectomy. No dilation of the biliary tree. Areas of lipomatous infiltration and atrophy of the pancreas which may relate to prior inflammation or metabolic syndrome in the setting of hepatic steatosis. Previously noted mild interstitial edema is less conspicuous. No signs of worsening pancreatitis or drainable collection. Spleen is unremarkable.  No concerning adrenal nodule. Punctate nonobstructing left upper pole renal calculus. Symmetric renal size and contour. No hydronephrosis. Urinary bladder  unremarkable. Possible very small intramural fibroid in the fundus on the right. Bilateral tubal ligation clips. Symmetric appearance of ovaries.  Expected configuration of stomach and duodenum. Normal appendix. No bowel obstruction or active inflammation. Negative for abdominal aortic aneurysm. Similar focal narrowing of proximal celiac artery with mild poststenotic dilation. In the setting of a prominent GDA and few pancreaticoduodenal collaterals this is a chronic finding and could represent median arcuate ligament syndrome in the right clinical setting. No suspicious adenopathy. No ascites, free or drainable collection.  Soft tissues of the chest wall grossly unremarkable. Degenerative related changes of the spine with probable diffuse congenital narrowing. No acute fracture or aggressive bone lesion. Benign lucent lesion in T8 is stable.      Impression: 1. Resolving interstitial pancreatitis. No worsening inflammation or drainable collection. 2. Lipomatous infiltration and atrophy of the pancreas poss related to chronic pancreatitis or metabolic syndrome in the setting of marked hepatic steatosis. 3. Findings of possible median arcuate ligament syndrome in the right clinical setting. This is chronic in the setting of a prominent GDA and pancreaticoduodenal collaterals. 4. Punctate nonobstructing left upper pole renal calculus.   Electronically Signed By-Boaz Chi MD On:3/19/2024 8:10 PM         Differential Diagnosis and Discussion:      Abdominal Pain: Based on the patient's signs and symptoms, I considered abdominal aortic aneurysm, small bowel obstruction, pancreatitis, acute cholecystitis, acute appendecitis, peptic ulcer disease, gastritis, colitis, endocrine disorders, irritable bowel syndrome and other differential diagnosis an etiology of the patient's abdominal pain.  Vomiting: Differential diagnosis includes but is not limited to migraine, labyrinthine disorders, psychogenic, metabolic and  endocrine causes, peptic ulcer, gastric outlet obstruction, gastritis, gastroenteritis, appendicitis, intestinal obstruction, paralytic ileus, food poisoning, cholecystitis, acute hepatitis, acute pancreatitis, acute febrile illness, and myocardial infarction.    All labs were reviewed and interpreted by me.  CT scan radiology impression was interpreted by me.    MDM     Amount and/or Complexity of Data Reviewed  Clinical lab tests: reviewed  Tests in the radiology section of CPT®: reviewed  Decide to obtain previous medical records or to obtain history from someone other than the patient: yes                 Patient Care Considerations:    SEPSIS was considered but is NOT present in the emergency department as SIRS criteria is not present.      Consultants/Shared Management Plan:    Hospitalist: I have discussed the case with Dr. Del Rio who agrees to accept the patient for admission.    Social Determinants of Health:    Patient is independent, reliable, and has access to care.       Disposition and Care Coordination:    Admit:   Through independent evaluation of the patient's history, physical, and imperical data, the patient meets criteria for inpatient admission to the hospital.        Final diagnoses:   Other acute pancreatitis, unspecified complication status (resolving acute on chronic)        ED Disposition       ED Disposition   Decision to Admit    Condition   --    Comment   Level of Care: Med/Surg [1]   Diagnosis: Pancreatitis [202663]                 This medical record created using voice recognition software.             Dany Singh PA-C  03/19/24 0539

## 2024-03-20 ENCOUNTER — APPOINTMENT (OUTPATIENT)
Dept: MRI IMAGING | Facility: HOSPITAL | Age: 39
DRG: 439 | End: 2024-03-20
Payer: MEDICARE

## 2024-03-20 PROBLEM — K86.1 ACUTE ON CHRONIC PANCREATITIS: Status: ACTIVE | Noted: 2024-03-20

## 2024-03-20 PROBLEM — K85.90 ACUTE ON CHRONIC PANCREATITIS: Status: ACTIVE | Noted: 2024-03-20

## 2024-03-20 LAB
ALBUMIN SERPL-MCNC: 3.8 G/DL (ref 3.5–5.2)
ALBUMIN/GLOB SERPL: 1.7 G/DL
ALP SERPL-CCNC: 71 U/L (ref 39–117)
ALT SERPL W P-5'-P-CCNC: 61 U/L (ref 1–33)
ANION GAP SERPL CALCULATED.3IONS-SCNC: 8.6 MMOL/L (ref 5–15)
AST SERPL-CCNC: 38 U/L (ref 1–32)
BASOPHILS # BLD AUTO: 0.05 10*3/MM3 (ref 0–0.2)
BASOPHILS NFR BLD AUTO: 0.4 % (ref 0–1.5)
BILIRUB SERPL-MCNC: 0.4 MG/DL (ref 0–1.2)
BUN SERPL-MCNC: 7 MG/DL (ref 6–20)
BUN/CREAT SERPL: 10.1 (ref 7–25)
CALCIUM SPEC-SCNC: 8.4 MG/DL (ref 8.6–10.5)
CHLORIDE SERPL-SCNC: 105 MMOL/L (ref 98–107)
CO2 SERPL-SCNC: 23.4 MMOL/L (ref 22–29)
CREAT SERPL-MCNC: 0.69 MG/DL (ref 0.57–1)
DEPRECATED RDW RBC AUTO: 47.6 FL (ref 37–54)
EGFRCR SERPLBLD CKD-EPI 2021: 113.4 ML/MIN/1.73
EOSINOPHIL # BLD AUTO: 0.65 10*3/MM3 (ref 0–0.4)
EOSINOPHIL NFR BLD AUTO: 5.6 % (ref 0.3–6.2)
ERYTHROCYTE [DISTWIDTH] IN BLOOD BY AUTOMATED COUNT: 14 % (ref 12.3–15.4)
GLOBULIN UR ELPH-MCNC: 2.3 GM/DL
GLUCOSE SERPL-MCNC: 89 MG/DL (ref 65–99)
HCT VFR BLD AUTO: 44.6 % (ref 34–46.6)
HGB BLD-MCNC: 14.5 G/DL (ref 12–15.9)
IMM GRANULOCYTES # BLD AUTO: 0.02 10*3/MM3 (ref 0–0.05)
IMM GRANULOCYTES NFR BLD AUTO: 0.2 % (ref 0–0.5)
LYMPHOCYTES # BLD AUTO: 5.96 10*3/MM3 (ref 0.7–3.1)
LYMPHOCYTES NFR BLD AUTO: 51 % (ref 19.6–45.3)
MAGNESIUM SERPL-MCNC: 1.6 MG/DL (ref 1.6–2.6)
MCH RBC QN AUTO: 29.8 PG (ref 26.6–33)
MCHC RBC AUTO-ENTMCNC: 32.5 G/DL (ref 31.5–35.7)
MCV RBC AUTO: 91.8 FL (ref 79–97)
MONOCYTES # BLD AUTO: 0.76 10*3/MM3 (ref 0.1–0.9)
MONOCYTES NFR BLD AUTO: 6.5 % (ref 5–12)
NEUTROPHILS NFR BLD AUTO: 36.3 % (ref 42.7–76)
NEUTROPHILS NFR BLD AUTO: 4.25 10*3/MM3 (ref 1.7–7)
NRBC BLD AUTO-RTO: 0 /100 WBC (ref 0–0.2)
PLATELET # BLD AUTO: 290 10*3/MM3 (ref 140–450)
PMV BLD AUTO: 9.9 FL (ref 6–12)
POTASSIUM SERPL-SCNC: 4.1 MMOL/L (ref 3.5–5.2)
PROT SERPL-MCNC: 6.1 G/DL (ref 6–8.5)
RBC # BLD AUTO: 4.86 10*6/MM3 (ref 3.77–5.28)
SODIUM SERPL-SCNC: 137 MMOL/L (ref 136–145)
WBC NRBC COR # BLD AUTO: 11.69 10*3/MM3 (ref 3.4–10.8)

## 2024-03-20 PROCEDURE — 25810000003 SODIUM CHLORIDE 0.9 % SOLUTION: Performed by: INTERNAL MEDICINE

## 2024-03-20 PROCEDURE — 99222 1ST HOSP IP/OBS MODERATE 55: CPT | Performed by: INTERNAL MEDICINE

## 2024-03-20 PROCEDURE — 74181 MRI ABDOMEN W/O CONTRAST: CPT

## 2024-03-20 PROCEDURE — 99232 SBSQ HOSP IP/OBS MODERATE 35: CPT | Performed by: INTERNAL MEDICINE

## 2024-03-20 PROCEDURE — 83735 ASSAY OF MAGNESIUM: CPT | Performed by: INTERNAL MEDICINE

## 2024-03-20 PROCEDURE — 25010000002 MORPHINE PER 10 MG: Performed by: INTERNAL MEDICINE

## 2024-03-20 PROCEDURE — 85025 COMPLETE CBC W/AUTO DIFF WBC: CPT | Performed by: INTERNAL MEDICINE

## 2024-03-20 PROCEDURE — 25010000002 ONDANSETRON PER 1 MG: Performed by: INTERNAL MEDICINE

## 2024-03-20 PROCEDURE — 25810000003 LACTATED RINGERS PER 1000 ML: Performed by: INTERNAL MEDICINE

## 2024-03-20 PROCEDURE — 80053 COMPREHEN METABOLIC PANEL: CPT | Performed by: INTERNAL MEDICINE

## 2024-03-20 RX ORDER — SODIUM CHLORIDE, SODIUM LACTATE, POTASSIUM CHLORIDE, CALCIUM CHLORIDE 600; 310; 30; 20 MG/100ML; MG/100ML; MG/100ML; MG/100ML
150 INJECTION, SOLUTION INTRAVENOUS CONTINUOUS
Status: DISCONTINUED | OUTPATIENT
Start: 2024-03-20 | End: 2024-03-21

## 2024-03-20 RX ORDER — LEVETIRACETAM 500 MG/1
500 TABLET ORAL 2 TIMES DAILY
Status: DISCONTINUED | OUTPATIENT
Start: 2024-03-20 | End: 2024-03-23 | Stop reason: HOSPADM

## 2024-03-20 RX ORDER — AMITRIPTYLINE HYDROCHLORIDE 25 MG/1
25 TABLET, FILM COATED ORAL NIGHTLY
Status: DISCONTINUED | OUTPATIENT
Start: 2024-03-20 | End: 2024-03-23 | Stop reason: HOSPADM

## 2024-03-20 RX ORDER — MONTELUKAST SODIUM 10 MG/1
10 TABLET ORAL NIGHTLY
Status: DISCONTINUED | OUTPATIENT
Start: 2024-03-20 | End: 2024-03-23 | Stop reason: HOSPADM

## 2024-03-20 RX ORDER — ENOXAPARIN SODIUM 100 MG/ML
40 INJECTION SUBCUTANEOUS EVERY 12 HOURS
Status: DISCONTINUED | OUTPATIENT
Start: 2024-03-20 | End: 2024-03-20

## 2024-03-20 RX ADMIN — MORPHINE SULFATE 4 MG: 4 INJECTION, SOLUTION INTRAMUSCULAR; INTRAVENOUS at 20:33

## 2024-03-20 RX ADMIN — SODIUM CHLORIDE 200 ML/HR: 9 INJECTION, SOLUTION INTRAVENOUS at 04:28

## 2024-03-20 RX ADMIN — SODIUM CHLORIDE, POTASSIUM CHLORIDE, SODIUM LACTATE AND CALCIUM CHLORIDE 150 ML/HR: 600; 310; 30; 20 INJECTION, SOLUTION INTRAVENOUS at 23:34

## 2024-03-20 RX ADMIN — ONDANSETRON 4 MG: 2 INJECTION INTRAMUSCULAR; INTRAVENOUS at 17:33

## 2024-03-20 RX ADMIN — SERTRALINE HYDROCHLORIDE 50 MG: 50 TABLET ORAL at 17:28

## 2024-03-20 RX ADMIN — Medication 10 ML: at 10:12

## 2024-03-20 RX ADMIN — ONDANSETRON 4 MG: 2 INJECTION INTRAMUSCULAR; INTRAVENOUS at 23:38

## 2024-03-20 RX ADMIN — Medication 10 ML: at 17:30

## 2024-03-20 RX ADMIN — PANCRELIPASE 72000 UNITS OF LIPASE: 24000; 76000; 120000 CAPSULE, DELAYED RELEASE PELLETS ORAL at 17:28

## 2024-03-20 RX ADMIN — PROPRANOLOL HYDROCHLORIDE 60 MG: 40 TABLET ORAL at 23:05

## 2024-03-20 RX ADMIN — RIVAROXABAN 20 MG: 20 TABLET, FILM COATED ORAL at 17:28

## 2024-03-20 RX ADMIN — ONDANSETRON 4 MG: 2 INJECTION INTRAMUSCULAR; INTRAVENOUS at 11:34

## 2024-03-20 RX ADMIN — MORPHINE SULFATE 4 MG: 4 INJECTION, SOLUTION INTRAMUSCULAR; INTRAVENOUS at 04:06

## 2024-03-20 RX ADMIN — SODIUM CHLORIDE, POTASSIUM CHLORIDE, SODIUM LACTATE AND CALCIUM CHLORIDE 150 ML/HR: 600; 310; 30; 20 INJECTION, SOLUTION INTRAVENOUS at 15:13

## 2024-03-20 RX ADMIN — MORPHINE SULFATE 4 MG: 4 INJECTION, SOLUTION INTRAMUSCULAR; INTRAVENOUS at 15:56

## 2024-03-20 RX ADMIN — Medication 10 ML: at 22:24

## 2024-03-20 RX ADMIN — LEVETIRACETAM 500 MG: 500 TABLET, FILM COATED ORAL at 22:25

## 2024-03-20 RX ADMIN — ONDANSETRON 4 MG: 2 INJECTION INTRAMUSCULAR; INTRAVENOUS at 04:29

## 2024-03-20 RX ADMIN — Medication 10 ML: at 09:02

## 2024-03-20 RX ADMIN — MONTELUKAST 10 MG: 10 TABLET, FILM COATED ORAL at 22:24

## 2024-03-20 RX ADMIN — MORPHINE SULFATE 4 MG: 4 INJECTION, SOLUTION INTRAMUSCULAR; INTRAVENOUS at 00:08

## 2024-03-20 RX ADMIN — AMITRIPTYLINE HYDROCHLORIDE 25 MG: 25 TABLET, FILM COATED ORAL at 23:06

## 2024-03-20 RX ADMIN — MORPHINE SULFATE 4 MG: 4 INJECTION, SOLUTION INTRAMUSCULAR; INTRAVENOUS at 09:02

## 2024-03-20 RX ADMIN — Medication 10 ML: at 11:34

## 2024-03-20 NOTE — PLAN OF CARE
Goal Outcome Evaluation: C/O nausea. Zofran given x2. Epigastric pain continues. Medicated x2. GI consult complete. MRI ordered and awaiting to complete. Continue POC.

## 2024-03-20 NOTE — PROGRESS NOTES
[facility]   Hospitalist Progress Note  Date: 3/20/2024  Patient Name: Tram García  : 1985  MRN: [id number]  Date of admission: 3/19/2024  Room/Bed: 3002/1      Subjective   Subjective     Chief Complaint: Abdominal pain     Summary:Tram García is a 38 y.o. female  past medical history of obesity with a BMI of 40, pancreatitis with extensive GI workup, altered to severe fatty liver on FibroScan, and he DVT on Xarelto who presents with abdominal pain in her epigastric area over the last 24-hour.  The patient has had abdominal pain for significant amount of time.  Through the records and through history is most notable the last year.  Last February the patient presented to gastroenterology at that time had an MRCP which showed fatty liver and fatty infiltration of her pancreas.  Patient proceeded to have an EGD and colonoscopy in 2023 that showed no abnormalities.  She was recently hospitalized at [facility] for pancreatitis from 3/8 to 3/12.  Her lipase was not elevated at the outside hospital or here on that admission but her CT did show mild pancreatitis.  Patient states she felt much better on day of discharge and went home with no issues.  However, today started developing significant epigastric pain.  She was not able to eat or drink.  As result she came to the ER for further evaluation.     In the emergency department the patient's vital signs are as follows: Temperature is 97.8, pulse 64, respiratory is 18, blood pressure 120/69, 97% on room air.  CBC does show a hemoconcentrated hemoglobin of 16.8 with no other abnormalities.  CMP shows mildly elevated LFTs with AST of 52 and ALT of 84 which is very comparable to previous elevations.  Bilirubin is normal.  Urinalysis shows no ketones in her urine.  Patient is normal.  CT scan the abdomen pelvis shows resolving interstitial pancreatitis.  There is infiltration and atrophy of the pancreas possibly related to chronic pancreatitis or  metabolic syndrome in the setting of marked hepatic steatosis.  The patient was admitted to hospital for observation for resuscitation and pain control.    Interval Followup:   Patient states that she continues to have intermittent abdominal pain with nausea and vomiting.  She states that she has tolerated her breakfast however was not able to tolerate her lunch.  Patient's vitals are stable.  GI is following    Review of Systems    All systems reviewed and negative except for what is outlined above.      Objective   Objective     Vitals:   Temp:  [97.8 °F (36.6 °C)-98.6 °F (37 °C)] 97.9 °F (36.6 °C)  Heart Rate:  [57-69] 63  Resp:  [17-18] 18  BP: (100-133)/(57-80) 106/69    Physical Exam   General: Awake, alert, NAD patient is resting in bed  HENT: NCAT, MMM  Eyes: pupils equal, no scleral icterus  Cardiovascular: RRR, no murmurs   Pulmonary: CTA bilaterally; no wheezes; no conversational dyspnea  Gastrointestinal: S/ND/NT, +BS  Musculoskeletal: No gross deformities  Skin: No jaundice, no rash on exposed skin appreciated  Neuro: CN II through XII grossly intact; speech clear; no tremor  Psych: Mood and affect appropriate  : No Grande catheter; no suprapubic tenderness    Result Review    Result Review:  I have personally reviewed these results:  [x]  Laboratory      Lab 03/20/24  0547 03/19/24  1834   WBC 11.69* 9.31   HEMOGLOBIN 14.5 16.8*   HEMATOCRIT 44.6 50.3*   PLATELETS 290 346   NEUTROS ABS 4.25 4.14   IMMATURE GRANS (ABS) 0.02 0.02   LYMPHS ABS 5.96* 4.01*   MONOS ABS 0.76 0.74   EOS ABS 0.65* 0.36   MCV 91.8 90.0         Lab 03/20/24  0547 03/19/24  1834   SODIUM 137 138   POTASSIUM 4.1 4.9   CHLORIDE 105 103   CO2 23.4 24.7   ANION GAP 8.6 10.3   BUN 7 10   CREATININE 0.69 0.76   EGFR 113.4 102.4   GLUCOSE 89 116*   CALCIUM 8.4* 9.7   MAGNESIUM 1.6  --          Lab 03/20/24  0547 03/19/24  1834   TOTAL PROTEIN 6.1 7.5   ALBUMIN 3.8 4.5   GLOBULIN 2.3 3.0   ALT (SGPT) 61* 84*   AST (SGOT) 38* 52*    BILIRUBIN 0.4 0.4   ALK PHOS 71 88   LIPASE  --  33                     Brief Urine Lab Results  (Last result in the past 365 days)        Color   Clarity   Blood   Leuk Est   Nitrite   Protein   CREAT   Urine HCG        03/19/24 1840 Yellow   Cloudy   Negative   Negative   Negative   Negative                 [x]  Microbiology   Microbiology Results (last 10 days)       ** No results found for the last 240 hours. **          [x]  Radiology  CT Abdomen Pelvis With Contrast    Result Date: 3/19/2024  Impression: 1. Resolving interstitial pancreatitis. No worsening inflammation or drainable collection. 2. Lipomatous infiltration and atrophy of the pancreas poss related to chronic pancreatitis or metabolic syndrome in the setting of marked hepatic steatosis. 3. Findings of possible median arcuate ligament syndrome in the right clinical setting. This is chronic in the setting of a prominent GDA and pancreaticoduodenal collaterals. 4. Punctate nonobstructing left upper pole renal calculus.   Electronically Signed By-Boaz Chi MD On:3/19/2024 8:10 PM     []  EKG/Telemetry   []  Cardiology/Vascular   []  Pathology  []  Old records  []  Other:    Assessment & Plan   Assessment / Plan     Assessment:  Acute on chronic pancreatitis  Super morbid obesity with a BMI of 40  Fatty liver  History of DVT on Xarelto  Median arcuate ligament syndrome?    Plan:  GI has been consulted  At this time we will discontinue IV fluids  Advance diet as tolerated  Continue patient on pain control and antiemetics  Resume patient's home medication  MRCP ordered to rule out any bile duct stones       Discussed with RN.    DVT prophylaxis:  No DVT prophylaxis order currently exists.        CODE STATUS:   Code Status (Patient has no pulse and is not breathing): CPR (Attempt to Resuscitate)  Medical Interventions (Patient has pulse or is breathing): Full Support      Electronically signed by Maury Piña DO, 3/20/2024, 15:11  EDT.

## 2024-03-20 NOTE — CONSULTS
Chief Complaint  Abdominal Pain and Vomiting    History of Present Illness  Tram García is a 38 y.o. female with history of acid reflux, anxiety, asthma, bleeding disorder, gallbladder stones, clotting disorder, deep vein thrombosis, GERD, hypertension, migraine headaches who presents to [address] on referral from No ref. provider found for a gastroenterology evaluation of pancreatitis.  Patient has recently been discharged from the hospital with diagnosis of pancreatitis she said she was doing better but then yesterday she had pain started coming back it is achy pain in  epigastric region of abdomen.  The pain is 7-8/10 intensity radiates to the back without any relieving factors.  Patient is also nauseated and having vomiting she cannot keep anything down including liquids.  There is no problem with bowel movements she is making urine.  There is no fever shakes chills or weight loss.        Labs Result Review Imaging    Past Medical History:   Diagnosis Date    Abnormal ECG     Acid reflux     Anemia     Anxiety 2014    Asthma     Bleeding disorder     Cholelithiasis     Clotting disorder     Deep vein thrombosis 2018    Depression     Foot sprain, right, initial encounter 10/21/2015    GERD (gastroesophageal reflux disease)     HTN (hypertension)     gestational    Migraine headache     Pancreatitis 2022    [facility] -[facility]    Pineal gland cyst 2014    Renal cyst 2014       Past Surgical History:   Procedure Laterality Date     SECTION      CHOLECYSTECTOMY      COLONOSCOPY N/A 2023    Procedure: COLONOSCOPY WITH BIOPSIES;  Surgeon: Feliz Olguin MD;  Location: [facility] ENDOSCOPY;  Service: Gastroenterology;  Laterality: N/A;  NORMAL COLONOSCOPY    ENDOSCOPY N/A 10/22/2021    Procedure: ESOPHAGOGASTRODUODENOSCOPY WITH BIOPSIES;  Surgeon: Feliz Olguin MD;  Location: [facility] ENDOSCOPY;  Service: Gastroenterology;  Laterality: N/A;   NORMAL EGD    ENDOSCOPY N/A 06/02/2023    Procedure: ESOPHAGOGASTRODUODENOSCOPY WITH BIOPSIES;  Surgeon: Feliz Olguin MD;  Location: [facility] ENDOSCOPY;  Service: Gastroenterology;  Laterality: N/A;  HIATAL HERNIA    LASER ABLATION      TUBAL ABDOMINAL LIGATION  03/27/2012         Current Facility-Administered Medications:     lactated ringers infusion, 150 mL/hr, Intravenous, Continuous, Feliz Olguin MD    morphine injection 4 mg, 4 mg, Intravenous, Q4H PRN, 4 mg at 03/20/24 0902 **AND** naloxone (NARCAN) injection 0.4 mg, 0.4 mg, Intravenous, Q5 Min PRN, Geoff Del Rio MD    ondansetron (ZOFRAN) injection 4 mg, 4 mg, Intravenous, Q6H PRN, Geoff Del Rio MD, 4 mg at 03/20/24 1134    sodium chloride 0.9 % flush 10 mL, 10 mL, Intravenous, PRN, Geoff Del Rio MD    sodium chloride 0.9 % flush 10 mL, 10 mL, Intravenous, Q12H, Geoff Del Rio MD, 10 mL at 03/20/24 0902    sodium chloride 0.9 % flush 10 mL, 10 mL, Intravenous, PRN, Geoff Del Rio MD, 10 mL at 03/20/24 1134    sodium chloride 0.9 % infusion 40 mL, 40 mL, Intravenous, PRN, Geoff Del Rio MD     Allergies   Allergen Reactions    Sumatriptan Shortness Of Breath    Amoxicillin Hives     Hives and blisters    Tylenol [Acetaminophen] Hives       Family History   Problem Relation Age of Onset    Diabetes Mother     Arthritis Mother     Anxiety disorder Mother     Hyperlipidemia Mother     Liver disease Mother     Stroke Mother     Thyroid disease Mother     Other Father         cardio vascular disease    Diabetes Father     Kidney failure Father     Arthritis Father     Colon cancer Neg Hx         Social History     Social History Narrative    Not on file   Social history negative for smoking, alcohol use, drug    Immunization:  Immunization History   Administered Date(s) Administered    COVID-19 (MODERNA) 1st,2nd,3rd Dose Monovalent 09/07/2021, 10/05/2021    Hep B, Adolescent or Pediatric 07/18/2000, 08/17/2000, 01/18/2001    MMR 03/08/1996, 05/22/1997    Td (TDVAX)  "01/18/2001        Objective     Review of Systems 10 system review is negative except as mentioned HPI    Vital Signs:   /69 (BP Location: Left arm, Patient Position: Lying)   Pulse 63   Temp 97.9 °F (36.6 °C) (Oral)   Resp 18   Ht 157.5 cm (62.01\")   Wt 100 kg (220 lb 7.4 oz)   SpO2 92%   BMI 40.31 kg/m²       Physical Exam  Constitutional:       General: She is awake. She is not in acute distress.     Appearance: Normal appearance. She is well-developed and well-groomed.   HENT:      Head: Normocephalic and atraumatic.      Mouth/Throat:      Mouth: Mucous membranes are moist.      Comments: Jodie dental hygiene is good  Eyes:      General: Lids are normal.      Conjunctiva/sclera: Conjunctivae normal.      Pupils: Pupils are equal, round, and reactive to light.   Neck:      Thyroid: No thyroid mass.      Trachea: Trachea normal.   Cardiovascular:      Rate and Rhythm: Normal rate and regular rhythm.      Heart sounds: Normal heart sounds.   Pulmonary:      Effort: Pulmonary effort is normal.      Breath sounds: Normal breath sounds and air entry.   Abdominal:      General: Abdomen is flat. Bowel sounds are normal. There is no distension.      Palpations: Abdomen is soft. There is no mass.      Tenderness: There is no abdominal tenderness. There is no guarding.   Musculoskeletal:      Cervical back: Neck supple.      Right lower leg: No edema.      Left lower leg: No edema.   Skin:     General: Skin is warm and moist.      Coloration: Skin is not cyanotic, jaundiced or pale.      Findings: No rash.      Nails: There is no clubbing.   Neurological:      Mental Status: She is alert and oriented to person, place, and time.   Psychiatric:         Attention and Perception: Attention normal.         Mood and Affect: Mood and affect normal.         Speech: Speech normal.         Labs:  Results from last 7 days   Lab Units 03/20/24  0547 03/19/24  1834   WBC 10*3/mm3 11.69* 9.31   HEMOGLOBIN g/dL 14.5 16.8* "   HEMATOCRIT % 44.6 50.3*   PLATELETS 10*3/mm3 290 346      Results from last 7 days   Lab Units 03/20/24  0547 03/19/24  1834   SODIUM mmol/L 137 138   POTASSIUM mmol/L 4.1 4.9   CHLORIDE mmol/L 105 103   CO2 mmol/L 23.4 24.7   BUN mg/dL 7 10   CREATININE mg/dL 0.69 0.76   CALCIUM mg/dL 8.4* 9.7   BILIRUBIN mg/dL 0.4 0.4   ALK PHOS U/L 71 88   ALT (SGPT) U/L 61* 84*   AST (SGOT) U/L 38* 52*   GLUCOSE mg/dL 89 116*             Assessment & Plan:  Principal Problem:    Pancreatitis  Active Problems:    Acute on chronic pancreatitis     Plan will continue IV fluids give analgesics and antiemetics as needed.  Will start patient clear liquid diet for diet trial.  Will check MRCP to rule out bile duct stones.  Patient was given instructions and counseling regarding her condition or for health maintenance advice. Please see specific information pulled into the AVS if appropriate.        Signed:  Joanna Olguin MD  03/20/24  15:06 EDT

## 2024-03-20 NOTE — PLAN OF CARE
Goal Outcome Evaluation:  Plan of Care Reviewed With: patient      Pt admitted from ED to unit. Pt vss. Pt c/o upper abdominal pain 9/10, PRN morphine administered. Continuing with plan of care.

## 2024-03-20 NOTE — OUTREACH NOTE
Medical Week 2 Survey      Flowsheet Row Responses   [facility] facility patient discharged from? [place]   Does the patient have one of the following disease processes/diagnoses(primary or secondary)? Other   Week 2 attempt successful? No   Unsuccessful attempts Attempt 1   Revoke Readmitted            JOSE MIGUEL VELA - Registered Nurse   SUBJECTIVE:                                                      Lazara Maria is a 7 [facility] Laurie Kirkpatrick MD  [facility] CHILDREN S

## 2024-03-20 NOTE — H&P
[facility] HISTORY AND PHYSICAL  Date: 3/19/2024   Patient Name: Tram García  : 1985  MRN: [id number]  Primary Care Physician:  Phyllis Meyers APRN  Date of admission: 3/19/2024    Subjective   Subjective     Chief Complaint: Abdominal pain    HPI:    Tram García is a 38 y.o. female past medical history of obesity with a BMI of 40, pancreatitis with extensive GI workup, altered to severe fatty liver on FibroScan, and he DVT on Xarelto who presents with abdominal pain in her epigastric area over the last 24-hour    The patient has had abdominal pain for significant amount of time.  Through the records and through history is most notable the last year.  Last February the patient presented to gastroenterology at that time had an MRCP which showed fatty liver and fatty infiltration of her pancreas.  Patient proceeded to have an EGD and colonoscopy in 2023 that showed no abnormalities.  She was recently hospitalized at [facility] for pancreatitis from 3/8 to 3/12.  Her lipase was not elevated at the outside hospital or here on that admission but her CT did show mild pancreatitis.  Patient states she felt much better on day of discharge and went home with no issues.  However, today started developing significant epigastric pain.  She was not able to eat or drink.  As result she came to the ER for further evaluation.    In the emergency department the patient's vital signs are as follows: Temperature is 97.8, pulse 64, respiratory is 18, blood pressure 120/69, 97% on room air.  CBC does show a hemoconcentrated hemoglobin of 16.8 with no other abnormalities.  CMP shows mildly elevated LFTs with AST of 52 and ALT of 84 which is very comparable to previous elevations.  Bilirubin is normal.  Urinalysis shows no ketones in her urine.  Patient is normal.  CT scan the abdomen pelvis shows resolving interstitial pancreatitis.  There is infiltration and atrophy of the pancreas possibly  related to chronic pancreatitis or metabolic syndrome in the setting of marked hepatic steatosis.  The patient was admitted to hospital for observation for resuscitation and pain control.    All systems reviewed abnormalities noted above  Personal History     Past Medical History:  Obesity with BMI 40  Fatty liver with severe to moderate score on FibroScan with elevated LFTs  Fatty infiltration of pancreas  Concern for chronic pancreatitis  Asthma  Clotting disorder/DVT on Xarelto  Hypertension  Migraine  GERD  Depression/anxiety    Past Surgical History:   section  Cholecystectomy  Colonoscopy  Endoscopy  Laser ablation of tubal ligation    Family History:   Anxiety  Thyroid disease  Kidney disease    Social History:   Former smoker  Never alcohol.    Home Medications:  Pancrelipase (Lip-Prot-Amyl), amitriptyline, famotidine, fenofibrate, levETIRAcetam, montelukast, naproxen, propranolol, rivaroxaban, sertraline, and vitamin D    Allergies:  Allergies   Allergen Reactions    Sumatriptan Shortness Of Breath    Amoxicillin Hives     Hives and blisters    Tylenol [Acetaminophen] Hives           Objective   Objective     Vitals:   Temp:  [97.8 °F (36.6 °C)] 97.8 °F (36.6 °C)  Heart Rate:  [63-68] 64  Resp:  [17-18] 18  BP: (120-133)/(69-78) 120/69    Physical Exam    Constitutional: Awake, alert, no acute distress   Eyes: Pupils equal, sclerae anicteric, no conjunctival injection   HENT: NCAT, mucous membranes moist   Neck: Supple, no thyromegaly, no lymphadenopathy, trachea midline   Respiratory: Clear to auscultation bilaterally, nonlabored respirations    Cardiovascular: RRR, no murmurs, rubs, or gallops, palpable pedal pulses bilaterally   Gastrointestinal: Epigastric pain with palpation.  Guarding.  No rebound.   Musculoskeletal: No bilateral ankle edema, no clubbing or cyanosis to extremities   Psychiatric: Appropriate affect, cooperative   Neurologic: Oriented x 3, strength symmetric in all extremities,  Cranial Nerves grossly intact to confrontation, speech clear   Skin: No rashes     Result Review    Result Review:  I have personally reviewed the results from the time of this admission to 3/19/2024 21:20 EDT and agree with these findings:  Hemoglobin is 16.8  CT scan the abdomen pelvis  Resolving interstitial pancreatitis no worsening inflammation or drainable collection  The lipomatous infiltration and atrophy of the pancreas possible related to chronic pancreatitis or metabolic syndrome in the setting of marked hepatic steatosis  Is a possible median arcuate ligament syndrome in the right clinical setting due to prominent GDA and pancreatic duodenal collaterals  Punctate nonobstructing left upper renal calculus      Assessment & Plan   Assessment / Plan     Assessment/Plan:   Acute on chronic pancreatitis  Chronic pancreatitis  Median arcuate ligament syndrome?  DVT with history falling disorder on Xarelto  Fatty liver with mild elevation LFTs and motor previous hospitalization    Plan:  -- Admit to hospital service  -- Patient received 1 L normal saline emergency department  -- I will give her 2 L of LR in the emergency department  -- She will be started on 200 mL an hour overnight of normal saline  -- IV morphine for pain control  -- Zofran for nausea control  -- Will put a clear liquid diet  -- If patient does not show improvement then would consider consulting GI  -- Had a long discussion about the cyclical nature of acute pancreatitis and chronic pancreatitis.  We discussed specifically weight loss due to the fact that she has significant fatty liver on her FibroScan and the downstream complications of this.  Also discussed pain control and the fluctuations of pain with chronic pancreatitis.        DVT prophylaxis:  Xarelto        CODE STATUS:     Full code    Admission Status:  I believe this patient meets observation status.    Electronically signed by Geoff Del Rio MD, 03/19/24, 9:20 PM EDT.

## 2024-03-20 NOTE — H&P (VIEW-ONLY)
Chief Complaint  Abdominal Pain and Vomiting    History of Present Illness  Tram García is a 38 y.o. female with history of acid reflux, anxiety, asthma, bleeding disorder, gallbladder stones, clotting disorder, deep vein thrombosis, GERD, hypertension, migraine headaches who presents to [address] on referral from No ref. provider found for a gastroenterology evaluation of pancreatitis.  Patient has recently been discharged from the hospital with diagnosis of pancreatitis she said she was doing better but then yesterday she had pain started coming back it is achy pain in  epigastric region of abdomen.  The pain is 7-8/10 intensity radiates to the back without any relieving factors.  Patient is also nauseated and having vomiting she cannot keep anything down including liquids.  There is no problem with bowel movements she is making urine.  There is no fever shakes chills or weight loss.        Labs Result Review Imaging    Past Medical History:   Diagnosis Date    Abnormal ECG     Acid reflux     Anemia     Anxiety 2014    Asthma     Bleeding disorder     Cholelithiasis     Clotting disorder     Deep vein thrombosis 2018    Depression     Foot sprain, right, initial encounter 10/21/2015    GERD (gastroesophageal reflux disease)     HTN (hypertension)     gestational    Migraine headache     Pancreatitis 2022    [facility]    Pineal gland cyst 2014    Renal cyst 2014       Past Surgical History:   Procedure Laterality Date     SECTION      CHOLECYSTECTOMY      COLONOSCOPY N/A 2023    Procedure: COLONOSCOPY WITH BIOPSIES;  Surgeon: Feliz Olguin MD;  Location: [facility] ENDOSCOPY;  Service: Gastroenterology;  Laterality: N/A;  NORMAL COLONOSCOPY    ENDOSCOPY N/A 10/22/2021    Procedure: ESOPHAGOGASTRODUODENOSCOPY WITH BIOPSIES;  Surgeon: Feliz Olguin MD;  Location: [facility] ENDOSCOPY;  Service: Gastroenterology;  Laterality: N/A;   NORMAL EGD    ENDOSCOPY N/A 06/02/2023    Procedure: ESOPHAGOGASTRODUODENOSCOPY WITH BIOPSIES;  Surgeon: Feliz Olguin MD;  Location: [facility] ENDOSCOPY;  Service: Gastroenterology;  Laterality: N/A;  HIATAL HERNIA    LASER ABLATION      TUBAL ABDOMINAL LIGATION  03/27/2012         Current Facility-Administered Medications:     lactated ringers infusion, 150 mL/hr, Intravenous, Continuous, eFliz Olguin MD    morphine injection 4 mg, 4 mg, Intravenous, Q4H PRN, 4 mg at 03/20/24 0902 **AND** naloxone (NARCAN) injection 0.4 mg, 0.4 mg, Intravenous, Q5 Min PRN, Geoff Del Rio MD    ondansetron (ZOFRAN) injection 4 mg, 4 mg, Intravenous, Q6H PRN, Geoff Del Rio MD, 4 mg at 03/20/24 1134    sodium chloride 0.9 % flush 10 mL, 10 mL, Intravenous, PRN, Geoff Del Rio MD    sodium chloride 0.9 % flush 10 mL, 10 mL, Intravenous, Q12H, Geoff Del Rio MD, 10 mL at 03/20/24 0902    sodium chloride 0.9 % flush 10 mL, 10 mL, Intravenous, PRN, Geoff Del Rio MD, 10 mL at 03/20/24 1134    sodium chloride 0.9 % infusion 40 mL, 40 mL, Intravenous, PRN, Geoff Del Rio MD     Allergies   Allergen Reactions    Sumatriptan Shortness Of Breath    Amoxicillin Hives     Hives and blisters    Tylenol [Acetaminophen] Hives       Family History   Problem Relation Age of Onset    Diabetes Mother     Arthritis Mother     Anxiety disorder Mother     Hyperlipidemia Mother     Liver disease Mother     Stroke Mother     Thyroid disease Mother     Other Father         cardio vascular disease    Diabetes Father     Kidney failure Father     Arthritis Father     Colon cancer Neg Hx         Social History     Social History Narrative    Not on file   Social history negative for smoking, alcohol use, drug    Immunization:  Immunization History   Administered Date(s) Administered    COVID-19 (MODERNA) 1st,2nd,3rd Dose Monovalent 09/07/2021, 10/05/2021    Hep B, Adolescent or Pediatric 07/18/2000, 08/17/2000, 01/18/2001    MMR 03/08/1996, 05/22/1997    Td (TDVAX)  "01/18/2001        Objective     Review of Systems 10 system review is negative except as mentioned HPI    Vital Signs:   /69 (BP Location: Left arm, Patient Position: Lying)   Pulse 63   Temp 97.9 °F (36.6 °C) (Oral)   Resp 18   Ht 157.5 cm (62.01\")   Wt 100 kg (220 lb 7.4 oz)   SpO2 92%   BMI 40.31 kg/m²       Physical Exam  Constitutional:       General: She is awake. She is not in acute distress.     Appearance: Normal appearance. She is well-developed and well-groomed.   HENT:      Head: Normocephalic and atraumatic.      Mouth/Throat:      Mouth: Mucous membranes are moist.      Comments: Jodie dental hygiene is good  Eyes:      General: Lids are normal.      Conjunctiva/sclera: Conjunctivae normal.      Pupils: Pupils are equal, round, and reactive to light.   Neck:      Thyroid: No thyroid mass.      Trachea: Trachea normal.   Cardiovascular:      Rate and Rhythm: Normal rate and regular rhythm.      Heart sounds: Normal heart sounds.   Pulmonary:      Effort: Pulmonary effort is normal.      Breath sounds: Normal breath sounds and air entry.   Abdominal:      General: Abdomen is flat. Bowel sounds are normal. There is no distension.      Palpations: Abdomen is soft. There is no mass.      Tenderness: There is no abdominal tenderness. There is no guarding.   Musculoskeletal:      Cervical back: Neck supple.      Right lower leg: No edema.      Left lower leg: No edema.   Skin:     General: Skin is warm and moist.      Coloration: Skin is not cyanotic, jaundiced or pale.      Findings: No rash.      Nails: There is no clubbing.   Neurological:      Mental Status: She is alert and oriented to person, place, and time.   Psychiatric:         Attention and Perception: Attention normal.         Mood and Affect: Mood and affect normal.         Speech: Speech normal.         Labs:  Results from last 7 days   Lab Units 03/20/24  0547 03/19/24  1834   WBC 10*3/mm3 11.69* 9.31   HEMOGLOBIN g/dL 14.5 16.8* "   HEMATOCRIT % 44.6 50.3*   PLATELETS 10*3/mm3 290 346      Results from last 7 days   Lab Units 03/20/24  0547 03/19/24  1834   SODIUM mmol/L 137 138   POTASSIUM mmol/L 4.1 4.9   CHLORIDE mmol/L 105 103   CO2 mmol/L 23.4 24.7   BUN mg/dL 7 10   CREATININE mg/dL 0.69 0.76   CALCIUM mg/dL 8.4* 9.7   BILIRUBIN mg/dL 0.4 0.4   ALK PHOS U/L 71 88   ALT (SGPT) U/L 61* 84*   AST (SGOT) U/L 38* 52*   GLUCOSE mg/dL 89 116*             Assessment & Plan:  Principal Problem:    Pancreatitis  Active Problems:    Acute on chronic pancreatitis     Plan will continue IV fluids give analgesics and antiemetics as needed.  Will start patient clear liquid diet for diet trial.  Will check MRCP to rule out bile duct stones.  Patient was given instructions and counseling regarding her condition or for health maintenance advice. Please see specific information pulled into the AVS if appropriate.        Signed:  Joanna Olguin MD  03/20/24  15:06 EDT

## 2024-03-21 PROCEDURE — 25810000003 LACTATED RINGERS PER 1000 ML: Performed by: INTERNAL MEDICINE

## 2024-03-21 PROCEDURE — 99232 SBSQ HOSP IP/OBS MODERATE 35: CPT | Performed by: INTERNAL MEDICINE

## 2024-03-21 PROCEDURE — 25010000002 ONDANSETRON PER 1 MG: Performed by: INTERNAL MEDICINE

## 2024-03-21 PROCEDURE — 25010000002 MORPHINE PER 10 MG: Performed by: INTERNAL MEDICINE

## 2024-03-21 RX ADMIN — MONTELUKAST 10 MG: 10 TABLET, FILM COATED ORAL at 21:27

## 2024-03-21 RX ADMIN — MORPHINE SULFATE 4 MG: 4 INJECTION, SOLUTION INTRAMUSCULAR; INTRAVENOUS at 03:40

## 2024-03-21 RX ADMIN — SODIUM CHLORIDE, POTASSIUM CHLORIDE, SODIUM LACTATE AND CALCIUM CHLORIDE 150 ML/HR: 600; 310; 30; 20 INJECTION, SOLUTION INTRAVENOUS at 06:17

## 2024-03-21 RX ADMIN — LEVETIRACETAM 500 MG: 500 TABLET, FILM COATED ORAL at 21:27

## 2024-03-21 RX ADMIN — PANCRELIPASE 72000 UNITS OF LIPASE: 24000; 76000; 120000 CAPSULE, DELAYED RELEASE PELLETS ORAL at 08:39

## 2024-03-21 RX ADMIN — SERTRALINE HYDROCHLORIDE 50 MG: 50 TABLET ORAL at 08:34

## 2024-03-21 RX ADMIN — Medication 10 ML: at 21:27

## 2024-03-21 RX ADMIN — MORPHINE SULFATE 4 MG: 4 INJECTION, SOLUTION INTRAMUSCULAR; INTRAVENOUS at 12:40

## 2024-03-21 RX ADMIN — ONDANSETRON 4 MG: 2 INJECTION INTRAMUSCULAR; INTRAVENOUS at 06:17

## 2024-03-21 RX ADMIN — ONDANSETRON 4 MG: 2 INJECTION INTRAMUSCULAR; INTRAVENOUS at 19:13

## 2024-03-21 RX ADMIN — RIVAROXABAN 20 MG: 20 TABLET, FILM COATED ORAL at 08:34

## 2024-03-21 RX ADMIN — AMITRIPTYLINE HYDROCHLORIDE 25 MG: 25 TABLET, FILM COATED ORAL at 21:27

## 2024-03-21 RX ADMIN — LEVETIRACETAM 500 MG: 500 TABLET, FILM COATED ORAL at 08:34

## 2024-03-21 RX ADMIN — SODIUM CHLORIDE, POTASSIUM CHLORIDE, SODIUM LACTATE AND CALCIUM CHLORIDE 150 ML/HR: 600; 310; 30; 20 INJECTION, SOLUTION INTRAVENOUS at 12:40

## 2024-03-21 RX ADMIN — MORPHINE SULFATE 4 MG: 4 INJECTION, SOLUTION INTRAMUSCULAR; INTRAVENOUS at 22:11

## 2024-03-21 RX ADMIN — MORPHINE SULFATE 4 MG: 4 INJECTION, SOLUTION INTRAMUSCULAR; INTRAVENOUS at 08:40

## 2024-03-21 RX ADMIN — PROPRANOLOL HYDROCHLORIDE 60 MG: 40 TABLET ORAL at 08:34

## 2024-03-21 RX ADMIN — MORPHINE SULFATE 4 MG: 4 INJECTION, SOLUTION INTRAMUSCULAR; INTRAVENOUS at 18:08

## 2024-03-21 RX ADMIN — ONDANSETRON 4 MG: 2 INJECTION INTRAMUSCULAR; INTRAVENOUS at 12:41

## 2024-03-21 RX ADMIN — PANCRELIPASE 72000 UNITS OF LIPASE: 24000; 76000; 120000 CAPSULE, DELAYED RELEASE PELLETS ORAL at 18:48

## 2024-03-21 RX ADMIN — PANCRELIPASE 72000 UNITS OF LIPASE: 24000; 76000; 120000 CAPSULE, DELAYED RELEASE PELLETS ORAL at 13:11

## 2024-03-21 RX ADMIN — Medication 10 ML: at 08:33

## 2024-03-21 RX ADMIN — PROPRANOLOL HYDROCHLORIDE 60 MG: 40 TABLET ORAL at 21:27

## 2024-03-21 NOTE — PLAN OF CARE
Goal Outcome Evaluation:  Patient alert and oriented, able to make needs known.  Patient with no acute events thus far this shift.  Patient with c/o nausea x1, c/o pain x2, see emar for medication administration.  Patient with MRI complete, pt stated that she got sick down in MRI. Patient with no other needs at this time.  Continue plan of care.

## 2024-03-21 NOTE — PLAN OF CARE
Goal Outcome Evaluation:  Plan of Care Reviewed With: patient        Progress: no change  Outcome Evaluation: VSS, full liquid diet today. Strict NPO after midnight tonight for EGD tomorrw morning with Dr. Olguin. C/o pain upper abdomen, see eMAR. No other complaints at this time.

## 2024-03-21 NOTE — PROGRESS NOTES
[facility]   Hospitalist Progress Note  Date: 3/21/2024  Patient Name: Tram García  : 1985  MRN: [id number]  Date of admission: 3/19/2024  Room/Bed: 3002/1      Subjective   Subjective     Chief Complaint: Abdominal pain     Summary:Tram García is a 38 y.o. female  past medical history of obesity with a BMI of 40, pancreatitis with extensive GI workup, altered to severe fatty liver on FibroScan, and he DVT on Xarelto who presents with abdominal pain in her epigastric area over the last 24-hour.  The patient has had abdominal pain for significant amount of time.  Through the records and through history is most notable the last year.  Last February the patient presented to gastroenterology at that time had an MRCP which showed fatty liver and fatty infiltration of her pancreas.  Patient proceeded to have an EGD and colonoscopy in 2023 that showed no abnormalities.  She was recently hospitalized at [facility] for pancreatitis from 3/8 to 3/12.  Her lipase was not elevated at the outside hospital or here on that admission but her CT did show mild pancreatitis.  Patient states she felt much better on day of discharge and went home with no issues.  However, today started developing significant epigastric pain.  She was not able to eat or drink.  As result she came to the ER for further evaluation.     In the emergency department the patient's vital signs are as follows: Temperature is 97.8, pulse 64, respiratory is 18, blood pressure 120/69, 97% on room air.  CBC does show a hemoconcentrated hemoglobin of 16.8 with no other abnormalities.  CMP shows mildly elevated LFTs with AST of 52 and ALT of 84 which is very comparable to previous elevations.  Bilirubin is normal.  Urinalysis shows no ketones in her urine.  Patient is normal.  CT scan the abdomen pelvis shows resolving interstitial pancreatitis.  There is infiltration and atrophy of the pancreas possibly related to chronic pancreatitis or  metabolic syndrome in the setting of marked hepatic steatosis.  The patient was admitted to hospital for observation for resuscitation and pain control.    Interval Followup:   Patient states that she continues to have intermittent abdominal pain with nausea and vomiting.    Patient's vitals are stable.  GI is following  MRCP was fine  GI to do EGD in am     Review of Systems    All systems reviewed and negative except for what is outlined above.      Objective   Objective     Vitals:   Temp:  [97.5 °F (36.4 °C)-98.8 °F (37.1 °C)] 98.2 °F (36.8 °C)  Heart Rate:  [57-67] 60  Resp:  [18-21] 20  BP: ()/(56-91) 100/56    Physical Exam   General: Awake, alert, NAD patient is resting in bed  HENT: NCAT, MMM  Eyes: pupils equal, no scleral icterus  Cardiovascular: RRR, no murmurs   Pulmonary: CTA bilaterally; no wheezes; no conversational dyspnea  Gastrointestinal: S/ND/NT, +BS  Musculoskeletal: No gross deformities  Skin: No jaundice, no rash on exposed skin appreciated  Neuro: CN II through XII grossly intact; speech clear; no tremor  Psych: Mood and affect appropriate  : No Grande catheter; no suprapubic tenderness    Result Review    Result Review:  I have personally reviewed these results:  [x]  Laboratory      Lab 03/20/24  0547 03/19/24  1834   WBC 11.69* 9.31   HEMOGLOBIN 14.5 16.8*   HEMATOCRIT 44.6 50.3*   PLATELETS 290 346   NEUTROS ABS 4.25 4.14   IMMATURE GRANS (ABS) 0.02 0.02   LYMPHS ABS 5.96* 4.01*   MONOS ABS 0.76 0.74   EOS ABS 0.65* 0.36   MCV 91.8 90.0         Lab 03/20/24  0547 03/19/24  1834   SODIUM 137 138   POTASSIUM 4.1 4.9   CHLORIDE 105 103   CO2 23.4 24.7   ANION GAP 8.6 10.3   BUN 7 10   CREATININE 0.69 0.76   EGFR 113.4 102.4   GLUCOSE 89 116*   CALCIUM 8.4* 9.7   MAGNESIUM 1.6  --          Lab 03/20/24  0547 03/19/24  1834   TOTAL PROTEIN 6.1 7.5   ALBUMIN 3.8 4.5   GLOBULIN 2.3 3.0   ALT (SGPT) 61* 84*   AST (SGOT) 38* 52*   BILIRUBIN 0.4 0.4   ALK PHOS 71 88   LIPASE  --  33                      Brief Urine Lab Results  (Last result in the past 365 days)        Color   Clarity   Blood   Leuk Est   Nitrite   Protein   CREAT   Urine HCG        03/19/24 1840 Yellow   Cloudy   Negative   Negative   Negative   Negative                 [x]  Microbiology   Microbiology Results (last 10 days)       ** No results found for the last 240 hours. **          [x]  Radiology  MRI abdomen wo contrast mrcp    Result Date: 3/20/2024  Impression: 1. Unremarkable postcholecystectomy cholangiogram. 2. Lipomatous infiltration and atrophy of the pancreas without MRI findings of residual active inflammation. No main duct dilation or divisum morphology. 3. Marked hepatic steatosis. 4. No acute MRI findings.    Electronically Signed ByHolden Chi MD On:3/20/2024 9:59 PM      CT Abdomen Pelvis With Contrast    Result Date: 3/19/2024  Impression: 1. Resolving interstitial pancreatitis. No worsening inflammation or drainable collection. 2. Lipomatous infiltration and atrophy of the pancreas poss related to chronic pancreatitis or metabolic syndrome in the setting of marked hepatic steatosis. 3. Findings of possible median arcuate ligament syndrome in the right clinical setting. This is chronic in the setting of a prominent GDA and pancreaticoduodenal collaterals. 4. Punctate nonobstructing left upper pole renal calculus.   Electronically Signed ByHolden Chi MD On:3/19/2024 8:10 PM     []  EKG/Telemetry   []  Cardiology/Vascular   []  Pathology  []  Old records  []  Other:    Assessment & Plan   Assessment / Plan     Assessment:  Acute on chronic pancreatitis  Super morbid obesity with a BMI of 40  Fatty liver  History of DVT on Xarelto  Median arcuate ligament syndrome?    Plan:  GI following   Stop IV fluids   Advance diet as tolerated  Continue patient on pain control and antiemetics  Resumed patient's home medication  MRCP appears fine  Plan for EGD in am   Repeat labs in am        Discussed with  RN.    DVT prophylaxis:  Medical DVT prophylaxis orders are present.        CODE STATUS:   Code Status (Patient has no pulse and is not breathing): CPR (Attempt to Resuscitate)  Medical Interventions (Patient has pulse or is breathing): Full Support      Electronically signed by Maury Piña DO, 3/21/2024, 10:34 EDT.

## 2024-03-21 NOTE — PROGRESS NOTES
[facility]  Inpatient Progress Note    Reason for Follow Up: Abdominal pain and vomiting    Subjective     Interval History:   Patient says her abdomen is sore-patient currently using heating pad to help with pain which she says helps.  Patient states she tried eating some oatmeal this morning, but was unable to keep it down.  Patient denies hematemesis when vomiting.  No other events overnight.    03/20/2024 MRCP  1. Unremarkable postcholecystectomy cholangiogram.  2. Lipomatous infiltration and atrophy of the pancreas without MRI  findings of residual active inflammation. No main duct dilation or  divisum morphology.  3. Marked hepatic steatosis.  4. No acute MRI findings.    Current Facility-Administered Medications:     amitriptyline (ELAVIL) tablet 25 mg, 25 mg, Oral, Nightly, Maruy Piña DO, 25 mg at 03/20/24 2306    lactated ringers infusion, 150 mL/hr, Intravenous, Continuous, Feliz Olguin MD, Last Rate: 150 mL/hr at 03/21/24 0617, 150 mL/hr at 03/21/24 0617    levETIRAcetam (KEPPRA) tablet 500 mg, 500 mg, Oral, BID, Maury Piña DO, 500 mg at 03/21/24 0834    montelukast (SINGULAIR) tablet 10 mg, 10 mg, Oral, Nightly, Maury Piña DO, 10 mg at 03/20/24 2224    morphine injection 4 mg, 4 mg, Intravenous, Q4H PRN, 4 mg at 03/21/24 0840 **AND** naloxone (NARCAN) injection 0.4 mg, 0.4 mg, Intravenous, Q5 Min PRN, Geoff Del Rio MD    ondansetron (ZOFRAN) injection 4 mg, 4 mg, Intravenous, Q6H PRN, Geoff Dle Rio MD, 4 mg at 03/21/24 0617    pancrelipase (Lip-Prot-Amyl) (CREON) capsule 36,000 units of lipase, 36,000 units of lipase, Oral, With Snacks, Maury Piña DO    pancrelipase (Lip-Prot-Amyl) (CREON) capsule 72,000 units of lipase, 72,000 units of lipase, Oral, TID With Meals, Maury Piña DO, 72,000 units of lipase at 03/21/24 0839    propranolol (INDERAL) tablet 60 mg, 60 mg, Oral, BID, Maury Piña DO, 60 mg at 03/21/24 0834    rivaroxaban (XARELTO) tablet 20 mg,  20 mg, Oral, Daily, Maury Piña DO, 20 mg at 03/21/24 0834    sertraline (ZOLOFT) tablet 50 mg, 50 mg, Oral, Daily, Maury Piña DO, 50 mg at 03/21/24 0834    sodium chloride 0.9 % flush 10 mL, 10 mL, Intravenous, PRN, Geoff Del Rio MD    sodium chloride 0.9 % flush 10 mL, 10 mL, Intravenous, Q12H, Geoff Del Rio MD, 10 mL at 03/21/24 0833    sodium chloride 0.9 % flush 10 mL, 10 mL, Intravenous, PRN, Geoff Del Rio MD, 10 mL at 03/20/24 1730    sodium chloride 0.9 % infusion 40 mL, 40 mL, Intravenous, Quang WAGNER Jay, MD  Review of Systems:    All systems were reviewed and negative except for:  Gastrointestinal: positive for  See HPI    Objective     Vital Signs  Temp:  [97.5 °F (36.4 °C)-98.8 °F (37.1 °C)] 98.2 °F (36.8 °C)  Heart Rate:  [57-67] 60  Resp:  [18-21] 20  BP: ()/(56-91) 100/56  Body mass index is 43.62 kg/m².    Intake/Output Summary (Last 24 hours) at 3/21/2024 0859  Last data filed at 3/21/2024 0714  Gross per 24 hour   Intake 3448 ml   Output 1925 ml   Net 1523 ml     I/O this shift:  In: -   Out: 450 [Urine:450]     Physical Exam:   General: awake, alert and in no acute distress   Eyes: eyes move symmetrical in all directions, no scleral icterus   Neck: supple, trachea is midline   Skin: warm and dry, not jaundiced   Cardiovascular: regular rhythm and rate   Pulm: Unlabored breathing   Abdomen: soft, tender in epigastric and right upper quadrant on palpation, non distended   Rectal: deferred   Extremities: no rash or edema   Psychiatric: mental status within normal limits, alert and oriented      Results Review:     I reviewed the patient's new clinical results.    Results from last 7 days   Lab Units 03/20/24  0547 03/19/24  1834   WBC 10*3/mm3 11.69* 9.31   HEMOGLOBIN g/dL 14.5 16.8*   HEMATOCRIT % 44.6 50.3*   PLATELETS 10*3/mm3 290 346     Results from last 7 days   Lab Units 03/20/24  0547 03/19/24  1834   SODIUM mmol/L 137 138   POTASSIUM mmol/L 4.1 4.9   CHLORIDE mmol/L 105 103   CO2  mmol/L 23.4 24.7   BUN mg/dL 7 10   CREATININE mg/dL 0.69 0.76   CALCIUM mg/dL 8.4* 9.7   BILIRUBIN mg/dL 0.4 0.4   ALK PHOS U/L 71 88   ALT (SGPT) U/L 61* 84*   AST (SGOT) U/L 38* 52*   GLUCOSE mg/dL 89 116*         Lab Results   Lab Value Date/Time    LIPASE 33 03/19/2024 1834    LIPASE 27 03/08/2024 0226    LIPASE 34 01/05/2023 2219    LIPASE 37 08/23/2019 1621    LIPASE 33 04/22/2019 1950       Radiology:    MRI abdomen wo contrast mrcp    Result Date: 3/20/2024  Impression: 1. Unremarkable postcholecystectomy cholangiogram. 2. Lipomatous infiltration and atrophy of the pancreas without MRI findings of residual active inflammation. No main duct dilation or divisum morphology. 3. Marked hepatic steatosis. 4. No acute MRI findings.    Electronically Signed ByHolden Chi MD On:3/20/2024 9:59 PM      CT Abdomen Pelvis With Contrast    Result Date: 3/19/2024  Impression: 1. Resolving interstitial pancreatitis. No worsening inflammation or drainable collection. 2. Lipomatous infiltration and atrophy of the pancreas poss related to chronic pancreatitis or metabolic syndrome in the setting of marked hepatic steatosis. 3. Findings of possible median arcuate ligament syndrome in the right clinical setting. This is chronic in the setting of a prominent GDA and pancreaticoduodenal collaterals. 4. Punctate nonobstructing left upper pole renal calculus.   Electronically Signed ByHolden Chi MD On:3/19/2024 8:10 PM         Assessment & Plan   Assessment:   Pancreatitis      Plan:   Patient still experiencing abdominal soreness and vomiting-discussed with patient possibly performing EGD to further evaluate-patient ate breakfast this morning so if EGD to be performed it would be tomorrow morning.  Continue IV hydration, pain medication, antiemetics, and heating pad   MRCP showed no bile duct stones  Patient's LFTs continue to improve  Patient understands and agrees to the plan    I discussed the patients findings  and my recommendations with patient.      Electronically signed by BOSTON Landis, 3/21/2024, 08:59 EDT.

## 2024-03-22 ENCOUNTER — ANESTHESIA (OUTPATIENT)
Dept: GASTROENTEROLOGY | Facility: HOSPITAL | Age: 39
End: 2024-03-22
Payer: MEDICARE

## 2024-03-22 ENCOUNTER — ANESTHESIA EVENT (OUTPATIENT)
Dept: GASTROENTEROLOGY | Facility: HOSPITAL | Age: 39
End: 2024-03-22
Payer: MEDICARE

## 2024-03-22 PROCEDURE — 0DB68ZX EXCISION OF STOMACH, VIA NATURAL OR ARTIFICIAL OPENING ENDOSCOPIC, DIAGNOSTIC: ICD-10-PCS | Performed by: INTERNAL MEDICINE

## 2024-03-22 PROCEDURE — 99232 SBSQ HOSP IP/OBS MODERATE 35: CPT | Performed by: INTERNAL MEDICINE

## 2024-03-22 PROCEDURE — 25010000002 ONDANSETRON PER 1 MG: Performed by: INTERNAL MEDICINE

## 2024-03-22 PROCEDURE — 25010000002 MORPHINE PER 10 MG: Performed by: INTERNAL MEDICINE

## 2024-03-22 PROCEDURE — 88305 TISSUE EXAM BY PATHOLOGIST: CPT | Performed by: INTERNAL MEDICINE

## 2024-03-22 PROCEDURE — 0DB48ZX EXCISION OF ESOPHAGOGASTRIC JUNCTION, VIA NATURAL OR ARTIFICIAL OPENING ENDOSCOPIC, DIAGNOSTIC: ICD-10-PCS | Performed by: INTERNAL MEDICINE

## 2024-03-22 PROCEDURE — 25010000002 PROPOFOL 10 MG/ML EMULSION: Performed by: NURSE ANESTHETIST, CERTIFIED REGISTERED

## 2024-03-22 PROCEDURE — 43239 EGD BIOPSY SINGLE/MULTIPLE: CPT | Performed by: INTERNAL MEDICINE

## 2024-03-22 PROCEDURE — 25810000003 LACTATED RINGERS PER 1000 ML: Performed by: NURSE ANESTHETIST, CERTIFIED REGISTERED

## 2024-03-22 RX ORDER — OXYCODONE HYDROCHLORIDE 5 MG/1
5 TABLET ORAL ONCE AS NEEDED
Status: COMPLETED | OUTPATIENT
Start: 2024-03-22 | End: 2024-03-22

## 2024-03-22 RX ORDER — PROPOFOL 10 MG/ML
VIAL (ML) INTRAVENOUS AS NEEDED
Status: DISCONTINUED | OUTPATIENT
Start: 2024-03-22 | End: 2024-03-22 | Stop reason: SURG

## 2024-03-22 RX ORDER — SODIUM CHLORIDE, SODIUM LACTATE, POTASSIUM CHLORIDE, CALCIUM CHLORIDE 600; 310; 30; 20 MG/100ML; MG/100ML; MG/100ML; MG/100ML
INJECTION, SOLUTION INTRAVENOUS CONTINUOUS PRN
Status: DISCONTINUED | OUTPATIENT
Start: 2024-03-22 | End: 2024-03-22 | Stop reason: SURG

## 2024-03-22 RX ORDER — LIDOCAINE HYDROCHLORIDE 20 MG/ML
INJECTION, SOLUTION EPIDURAL; INFILTRATION; INTRACAUDAL; PERINEURAL AS NEEDED
Status: DISCONTINUED | OUTPATIENT
Start: 2024-03-22 | End: 2024-03-22 | Stop reason: SURG

## 2024-03-22 RX ADMIN — PROPOFOL 50 MG: 10 INJECTION, EMULSION INTRAVENOUS at 16:48

## 2024-03-22 RX ADMIN — PANCRELIPASE 72000 UNITS OF LIPASE: 24000; 76000; 120000 CAPSULE, DELAYED RELEASE PELLETS ORAL at 11:07

## 2024-03-22 RX ADMIN — PROPRANOLOL HYDROCHLORIDE 60 MG: 40 TABLET ORAL at 10:11

## 2024-03-22 RX ADMIN — LIDOCAINE HYDROCHLORIDE 50 MG: 20 INJECTION, SOLUTION EPIDURAL; INFILTRATION; INTRACAUDAL; PERINEURAL at 16:47

## 2024-03-22 RX ADMIN — AMITRIPTYLINE HYDROCHLORIDE 25 MG: 25 TABLET, FILM COATED ORAL at 21:48

## 2024-03-22 RX ADMIN — PANCRELIPASE 72000 UNITS OF LIPASE: 24000; 76000; 120000 CAPSULE, DELAYED RELEASE PELLETS ORAL at 17:45

## 2024-03-22 RX ADMIN — SERTRALINE HYDROCHLORIDE 50 MG: 50 TABLET ORAL at 10:11

## 2024-03-22 RX ADMIN — PROPOFOL 100 MG: 10 INJECTION, EMULSION INTRAVENOUS at 16:46

## 2024-03-22 RX ADMIN — PROPOFOL 30 MG: 10 INJECTION, EMULSION INTRAVENOUS at 16:50

## 2024-03-22 RX ADMIN — MONTELUKAST 10 MG: 10 TABLET, FILM COATED ORAL at 21:48

## 2024-03-22 RX ADMIN — LEVETIRACETAM 500 MG: 500 TABLET, FILM COATED ORAL at 10:11

## 2024-03-22 RX ADMIN — SODIUM CHLORIDE, POTASSIUM CHLORIDE, SODIUM LACTATE AND CALCIUM CHLORIDE: 600; 310; 30; 20 INJECTION, SOLUTION INTRAVENOUS at 16:37

## 2024-03-22 RX ADMIN — MORPHINE SULFATE 4 MG: 4 INJECTION, SOLUTION INTRAMUSCULAR; INTRAVENOUS at 08:31

## 2024-03-22 RX ADMIN — Medication 10 ML: at 08:33

## 2024-03-22 RX ADMIN — MORPHINE SULFATE 4 MG: 4 INJECTION, SOLUTION INTRAMUSCULAR; INTRAVENOUS at 13:33

## 2024-03-22 RX ADMIN — MORPHINE SULFATE 4 MG: 4 INJECTION, SOLUTION INTRAMUSCULAR; INTRAVENOUS at 17:44

## 2024-03-22 RX ADMIN — PROPRANOLOL HYDROCHLORIDE 60 MG: 40 TABLET ORAL at 21:48

## 2024-03-22 RX ADMIN — ONDANSETRON 4 MG: 2 INJECTION INTRAMUSCULAR; INTRAVENOUS at 03:18

## 2024-03-22 RX ADMIN — Medication 10 ML: at 21:49

## 2024-03-22 RX ADMIN — PANCRELIPASE 72000 UNITS OF LIPASE: 24000; 76000; 120000 CAPSULE, DELAYED RELEASE PELLETS ORAL at 10:11

## 2024-03-22 RX ADMIN — MORPHINE SULFATE 4 MG: 4 INJECTION, SOLUTION INTRAMUSCULAR; INTRAVENOUS at 21:48

## 2024-03-22 RX ADMIN — LIDOCAINE HYDROCHLORIDE 50 MG: 20 INJECTION, SOLUTION EPIDURAL; INFILTRATION; INTRACAUDAL; PERINEURAL at 16:45

## 2024-03-22 RX ADMIN — OXYCODONE 5 MG: 5 TABLET ORAL at 23:31

## 2024-03-22 RX ADMIN — LEVETIRACETAM 500 MG: 500 TABLET, FILM COATED ORAL at 21:48

## 2024-03-22 RX ADMIN — MORPHINE SULFATE 4 MG: 4 INJECTION, SOLUTION INTRAMUSCULAR; INTRAVENOUS at 03:18

## 2024-03-22 NOTE — PROGRESS NOTES
[facility]   Hospitalist Progress Note  Date: 3/22/2024  Patient Name: Tram García  : 1985  MRN: [id number]  Date of admission: 3/19/2024  Room/Bed: 3002/1      Subjective   Subjective     Chief Complaint: Abdominal pain     Summary:Tram García is a 38 y.o. female  past medical history of obesity with a BMI of 40, pancreatitis with extensive GI workup, altered to severe fatty liver on FibroScan, and he DVT on Xarelto who presents with abdominal pain in her epigastric area over the last 24-hour.  The patient has had abdominal pain for significant amount of time.  Through the records and through history is most notable the last year.  Last February the patient presented to gastroenterology at that time had an MRCP which showed fatty liver and fatty infiltration of her pancreas.  Patient proceeded to have an EGD and colonoscopy in 2023 that showed no abnormalities.  She was recently hospitalized at [place] for pancreatitis from 3/8 to 3/12.  Her lipase was not elevated at the outside hospital or here on that admission but her CT did show mild pancreatitis.  Patient states she felt much better on day of discharge and went home with no issues.  However, today started developing significant epigastric pain.  She was not able to eat or drink.  As result she came to the ER for further evaluation.     In the emergency department the patient's vital signs are as follows: Temperature is 97.8, pulse 64, respiratory is 18, blood pressure 120/69, 97% on room air.  CBC does show a hemoconcentrated hemoglobin of 16.8 with no other abnormalities.  CMP shows mildly elevated LFTs with AST of 52 and ALT of 84 which is very comparable to previous elevations.  Bilirubin is normal.  Urinalysis shows no ketones in her urine.  Patient is normal.  CT scan the abdomen pelvis shows resolving interstitial pancreatitis.  There is infiltration and atrophy of the pancreas possibly related to chronic pancreatitis or  metabolic syndrome in the setting of marked hepatic steatosis.  The patient was admitted to hospital for observation for resuscitation and pain control.    Interval Followup:   Patient states that she continues to have intermittent abdominal pain   No nausea or vomiting today  Patient's vitals are stable.  GI is following  MRCP was fine  GI to do EGD today     Review of Systems    All systems reviewed and negative except for what is outlined above.      Objective   Objective     Vitals:   Temp:  [97.8 °F (36.6 °C)-98.2 °F (36.8 °C)] 98.2 °F (36.8 °C)  Heart Rate:  [55-68] 55  Resp:  [16-20] 16  BP: (103-129)/(59-77) 109/59    Physical Exam   General: Awake, alert, NAD patient is walking around the room after a shower   HENT: NCAT, MMM  Eyes: pupils equal, no scleral icterus  Cardiovascular: RRR, no murmurs   Pulmonary: CTA bilaterally; no wheezes; no conversational dyspnea  Gastrointestinal: Soft, minimal tenderness to palpation   Musculoskeletal: No gross deformities  Skin: No jaundice, no rash on exposed skin appreciated  Neuro: CN II through XII grossly intact; speech clear; no tremor  Psych: Mood and affect appropriate  : No Grande catheter; no suprapubic tenderness    Result Review    Result Review:  I have personally reviewed these results:  [x]  Laboratory      Lab 03/20/24  0547 03/19/24  1834   WBC 11.69* 9.31   HEMOGLOBIN 14.5 16.8*   HEMATOCRIT 44.6 50.3*   PLATELETS 290 346   NEUTROS ABS 4.25 4.14   IMMATURE GRANS (ABS) 0.02 0.02   LYMPHS ABS 5.96* 4.01*   MONOS ABS 0.76 0.74   EOS ABS 0.65* 0.36   MCV 91.8 90.0         Lab 03/20/24  0547 03/19/24  1834   SODIUM 137 138   POTASSIUM 4.1 4.9   CHLORIDE 105 103   CO2 23.4 24.7   ANION GAP 8.6 10.3   BUN 7 10   CREATININE 0.69 0.76   EGFR 113.4 102.4   GLUCOSE 89 116*   CALCIUM 8.4* 9.7   MAGNESIUM 1.6  --          Lab 03/20/24  0547 03/19/24  1834   TOTAL PROTEIN 6.1 7.5   ALBUMIN 3.8 4.5   GLOBULIN 2.3 3.0   ALT (SGPT) 61* 84*   AST (SGOT) 38* 52*    BILIRUBIN 0.4 0.4   ALK PHOS 71 88   LIPASE  --  33                     Brief Urine Lab Results  (Last result in the past 365 days)        Color   Clarity   Blood   Leuk Est   Nitrite   Protein   CREAT   Urine HCG        03/19/24 1840 Yellow   Cloudy   Negative   Negative   Negative   Negative                 [x]  Microbiology   Microbiology Results (last 10 days)       ** No results found for the last 240 hours. **          [x]  Radiology  MRI abdomen wo contrast mrcp    Result Date: 3/20/2024  Impression: 1. Unremarkable postcholecystectomy cholangiogram. 2. Lipomatous infiltration and atrophy of the pancreas without MRI findings of residual active inflammation. No main duct dilation or divisum morphology. 3. Marked hepatic steatosis. 4. No acute MRI findings.    Electronically Signed ByHolden Chi MD On:3/20/2024 9:59 PM      CT Abdomen Pelvis With Contrast    Result Date: 3/19/2024  Impression: 1. Resolving interstitial pancreatitis. No worsening inflammation or drainable collection. 2. Lipomatous infiltration and atrophy of the pancreas poss related to chronic pancreatitis or metabolic syndrome in the setting of marked hepatic steatosis. 3. Findings of possible median arcuate ligament syndrome in the right clinical setting. This is chronic in the setting of a prominent GDA and pancreaticoduodenal collaterals. 4. Punctate nonobstructing left upper pole renal calculus.   Electronically Signed ByHolden Chi MD On:3/19/2024 8:10 PM     []  EKG/Telemetry   []  Cardiology/Vascular   []  Pathology  []  Old records  []  Other:    Assessment & Plan   Assessment / Plan     Assessment:  Acute on chronic pancreatitis  Super morbid obesity with a BMI of 40  Fatty liver  History of DVT on Xarelto  Median arcuate ligament syndrome?    Plan:  GI following   Advance diet as tolerated  Continue patient on pain control and antiemetics  Resumed patient's home medication  MRCP appears fine  Plan for EGD today    Repeat labs in am        Discussed with RN.    DVT prophylaxis:  Medical DVT prophylaxis orders are present.        CODE STATUS:   Code Status (Patient has no pulse and is not breathing): CPR (Attempt to Resuscitate)  Medical Interventions (Patient has pulse or is breathing): Full Support      Electronically signed by Maury Piña DO, 3/22/2024, 08:17 EDT.

## 2024-03-22 NOTE — PLAN OF CARE
Goal Outcome Evaluation:  Patient alert and oriented, able to make needs known.  Patient with c/o nausea and pain this shift, see emar.  Patient to go for EGD today. Patient strict NPO since Midnight.  Patient with no acute events thus far. Continue plan of care.

## 2024-03-22 NOTE — PLAN OF CARE
Goal Outcome Evaluation:  Plan of Care Reviewed With: patient        Progress: no change  Outcome Evaluation: VSS, NPO until EGD today. Morphine given for pain, see eMAR. No other complaints at this time. Will continue plan of care.

## 2024-03-22 NOTE — ANESTHESIA PREPROCEDURE EVALUATION
Anesthesia Evaluation     Patient summary reviewed and Nursing notes reviewed   NPO Solid Status: > 8 hours  NPO Liquid Status: > 2 hours           Airway   Mallampati: II  TM distance: >3 FB  Neck ROM: full  No difficulty expected  Dental    (+) poor dentition        Pulmonary     breath sounds clear to auscultation  (+) asthma,  Cardiovascular     Rhythm: regular  Rate: normal    (+) hypertension, DVT      Neuro/Psych  (+) headaches, psychiatric history  GI/Hepatic/Renal/Endo    (+) obesity, morbid obesity, GERD, renal disease-    Musculoskeletal     Abdominal    Substance History      OB/GYN          Other                          Anesthesia Plan    ASA 3     general   total IV anesthesia  (Total IV Anesthesia    Patient understands anesthesia not responsible for dental damage.  )  intravenous induction     Anesthetic plan, risks, benefits, and alternatives have been provided, discussed and informed consent has been obtained with: patient.  Pre-procedure education provided  Plan discussed with CRNA.        CODE STATUS:    Code Status (Patient has no pulse and is not breathing): CPR (Attempt to Resuscitate)  Medical Interventions (Patient has pulse or is breathing): Full Support

## 2024-03-22 NOTE — ANESTHESIA POSTPROCEDURE EVALUATION
Patient: Tram García    Procedure Summary       Date: 03/22/24 Room / Location: [facility] END[facility] 2 / [facility]    Anesthesia Start: 1641 Anesthesia Stop: 1659    Procedure: ESOPHAGOGASTRODUODENOSCOPY WITH BIOPSIES Diagnosis:       Heartburn      Epigastric pain      Gastroesophageal reflux disease without esophagitis      (Heartburn [R12])      (Epigastric pain [R10.13])      (Gastroesophageal reflux disease without esophagitis [K21.9])    Surgeons: Feliz Olguin MD Provider: Dafne De Los Santos CRNA    Anesthesia Type: general ASA Status: 3            Anesthesia Type: general    Vitals  Vitals Value Taken Time   /72 03/22/24 1659   Temp 36.9 °C (98.5 °F) 03/22/24 1658   Pulse 76 03/22/24 1700   Resp 16 03/22/24 1658   SpO2 96 % 03/22/24 1700   Vitals shown include unfiled device data.        Post Anesthesia Care and Evaluation    Patient location during evaluation: bedside  Patient participation: complete - patient participated  Level of consciousness: awake  Pain management: adequate    Airway patency: patent  Anesthetic complications: No anesthetic complications  PONV Status: controlled  Cardiovascular status: acceptable and stable  Respiratory status: acceptable

## 2024-03-23 VITALS
BODY MASS INDEX: 41.18 KG/M2 | DIASTOLIC BLOOD PRESSURE: 63 MMHG | WEIGHT: 223.77 LBS | HEART RATE: 57 BPM | RESPIRATION RATE: 18 BRPM | SYSTOLIC BLOOD PRESSURE: 97 MMHG | TEMPERATURE: 97.7 F | OXYGEN SATURATION: 93 % | HEIGHT: 62 IN

## 2024-03-23 LAB
ALBUMIN SERPL-MCNC: 3.9 G/DL (ref 3.5–5.2)
ALBUMIN/GLOB SERPL: 1.6 G/DL
ALP SERPL-CCNC: 73 U/L (ref 39–117)
ALT SERPL W P-5'-P-CCNC: 57 U/L (ref 1–33)
ANION GAP SERPL CALCULATED.3IONS-SCNC: 8.8 MMOL/L (ref 5–15)
AST SERPL-CCNC: 39 U/L (ref 1–32)
BILIRUB SERPL-MCNC: 0.7 MG/DL (ref 0–1.2)
BUN SERPL-MCNC: 6 MG/DL (ref 6–20)
BUN/CREAT SERPL: 7.2 (ref 7–25)
CALCIUM SPEC-SCNC: 9.2 MG/DL (ref 8.6–10.5)
CHLORIDE SERPL-SCNC: 104 MMOL/L (ref 98–107)
CHOLEST SERPL-MCNC: 147 MG/DL (ref 0–200)
CO2 SERPL-SCNC: 29.2 MMOL/L (ref 22–29)
CREAT SERPL-MCNC: 0.83 MG/DL (ref 0.57–1)
DEPRECATED RDW RBC AUTO: 45.8 FL (ref 37–54)
EGFRCR SERPLBLD CKD-EPI 2021: 92.1 ML/MIN/1.73
ERYTHROCYTE [DISTWIDTH] IN BLOOD BY AUTOMATED COUNT: 13.4 % (ref 12.3–15.4)
GLOBULIN UR ELPH-MCNC: 2.5 GM/DL
GLUCOSE SERPL-MCNC: 88 MG/DL (ref 65–99)
HCT VFR BLD AUTO: 45.9 % (ref 34–46.6)
HDLC SERPL-MCNC: 28 MG/DL (ref 40–60)
HGB BLD-MCNC: 14 G/DL (ref 12–15.9)
LDLC SERPL CALC-MCNC: 93 MG/DL (ref 0–100)
LDLC/HDLC SERPL: 3.19 {RATIO}
MAGNESIUM SERPL-MCNC: 1.7 MG/DL (ref 1.6–2.6)
MCH RBC QN AUTO: 28.5 PG (ref 26.6–33)
MCHC RBC AUTO-ENTMCNC: 30.5 G/DL (ref 31.5–35.7)
MCV RBC AUTO: 93.5 FL (ref 79–97)
PLATELET # BLD AUTO: 215 10*3/MM3 (ref 140–450)
PMV BLD AUTO: 10.6 FL (ref 6–12)
POTASSIUM SERPL-SCNC: 4.4 MMOL/L (ref 3.5–5.2)
PROT SERPL-MCNC: 6.4 G/DL (ref 6–8.5)
RBC # BLD AUTO: 4.91 10*6/MM3 (ref 3.77–5.28)
SODIUM SERPL-SCNC: 142 MMOL/L (ref 136–145)
TRIGL SERPL-MCNC: 148 MG/DL (ref 0–150)
VLDLC SERPL-MCNC: 26 MG/DL (ref 5–40)
WBC NRBC COR # BLD AUTO: 8 10*3/MM3 (ref 3.4–10.8)

## 2024-03-23 PROCEDURE — 85027 COMPLETE CBC AUTOMATED: CPT | Performed by: INTERNAL MEDICINE

## 2024-03-23 PROCEDURE — 80061 LIPID PANEL: CPT | Performed by: INTERNAL MEDICINE

## 2024-03-23 PROCEDURE — 25010000002 MORPHINE PER 10 MG: Performed by: INTERNAL MEDICINE

## 2024-03-23 PROCEDURE — 80053 COMPREHEN METABOLIC PANEL: CPT | Performed by: INTERNAL MEDICINE

## 2024-03-23 PROCEDURE — 83735 ASSAY OF MAGNESIUM: CPT | Performed by: INTERNAL MEDICINE

## 2024-03-23 PROCEDURE — 99239 HOSP IP/OBS DSCHRG MGMT >30: CPT | Performed by: INTERNAL MEDICINE

## 2024-03-23 RX ORDER — PANTOPRAZOLE SODIUM 40 MG/1
40 TABLET, DELAYED RELEASE ORAL DAILY
Qty: 30 TABLET | Refills: 0 | Status: SHIPPED | OUTPATIENT
Start: 2024-03-23 | End: 2024-04-22

## 2024-03-23 RX ORDER — OXYCODONE HYDROCHLORIDE 5 MG/1
5 TABLET ORAL ONCE AS NEEDED
Status: COMPLETED | OUTPATIENT
Start: 2024-03-23 | End: 2024-03-23

## 2024-03-23 RX ORDER — OXYCODONE HYDROCHLORIDE 5 MG/1
5 TABLET ORAL EVERY 8 HOURS PRN
Qty: 6 TABLET | Refills: 0 | Status: SHIPPED | OUTPATIENT
Start: 2024-03-23 | End: 2024-03-25

## 2024-03-23 RX ADMIN — PANCRELIPASE 72000 UNITS OF LIPASE: 24000; 76000; 120000 CAPSULE, DELAYED RELEASE PELLETS ORAL at 09:08

## 2024-03-23 RX ADMIN — PANCRELIPASE 72000 UNITS OF LIPASE: 24000; 76000; 120000 CAPSULE, DELAYED RELEASE PELLETS ORAL at 11:34

## 2024-03-23 RX ADMIN — MORPHINE SULFATE 4 MG: 4 INJECTION, SOLUTION INTRAMUSCULAR; INTRAVENOUS at 05:56

## 2024-03-23 RX ADMIN — OXYCODONE 5 MG: 5 TABLET ORAL at 09:49

## 2024-03-23 RX ADMIN — Medication 10 ML: at 09:09

## 2024-03-23 RX ADMIN — SERTRALINE HYDROCHLORIDE 50 MG: 50 TABLET ORAL at 09:08

## 2024-03-23 RX ADMIN — MORPHINE SULFATE 4 MG: 4 INJECTION, SOLUTION INTRAMUSCULAR; INTRAVENOUS at 01:52

## 2024-03-23 RX ADMIN — LEVETIRACETAM 500 MG: 500 TABLET, FILM COATED ORAL at 09:08

## 2024-03-23 RX ADMIN — PROPRANOLOL HYDROCHLORIDE 60 MG: 40 TABLET ORAL at 09:49

## 2024-03-23 RX ADMIN — RIVAROXABAN 20 MG: 20 TABLET, FILM COATED ORAL at 09:08

## 2024-03-23 NOTE — PLAN OF CARE
Goal Outcome Evaluation:      Pt discharging home via private vehicle w/ fiance. AVS reviewed. IV removed w/o complications. Belongings returned.

## 2024-03-23 NOTE — DISCHARGE SUMMARY
[facility]         HOSPITALIST  DISCHARGE SUMMARY    Patient Name: Tram García  : 1985  MRN: [id number]    Date of Admission: 3/19/2024  Date of Discharge:  3/23/2024    Primary Care Physician: Tricia Esparza MD    Consults       Date and Time Order Name Status Description    3/20/2024  2:24 PM Inpatient Gastroenterology Consult Completed     3/19/2024  8:41 PM Inpatient Hospitalist Consult              Active and Resolved Hospital Problems:  Active Hospital Problems    Diagnosis POA   • **Pancreatitis [K85.90] Yes   • Acute on chronic pancreatitis [K85.90, K86.1] Yes   • Heartburn [R12] Unknown   • Epigastric pain [R10.13] Unknown   • GERD (gastroesophageal reflux disease) [K21.9] Unknown      Resolved Hospital Problems   No resolved problems to display.       Hospital Course     Hospital Course:  Tram García is a 38 y.o. female with past medical history of obesity with a BMI of 40, pancreatitis with extensive GI workup, altered to severe fatty liver on FibroScan, and he DVT on Xarelto who presents with abdominal pain in her epigastric area over the last 24-hour.  The patient has had abdominal pain for significant amount of time.  Through the records and through history is most notable the last year.  Last February the patient presented to gastroenterology at that time had an MRCP which showed fatty liver and fatty infiltration of her pancreas.  Patient proceeded to have an EGD and colonoscopy in 2023 that showed no abnormalities.  She was recently hospitalized at [place] for pancreatitis from 3/8 to 3/12.  Her lipase was not elevated at the outside hospital or here on that admission but her CT did show mild pancreatitis.  Patient states she felt much better on day of discharge and went home with no issues.  However, today started developing significant epigastric pain.  She was not able to eat or drink.  As result she came to the ER for further evaluation. In the emergency  department the patient's vital signs are as follows: Temperature is 97.8, pulse 64, respiratory is 18, blood pressure 120/69, 97% on room air.  CBC does show a hemoconcentrated hemoglobin of 16.8 with no other abnormalities.  CMP shows mildly elevated LFTs with AST of 52 and ALT of 84 which is very comparable to previous elevations.  Bilirubin is normal.  Urinalysis shows no ketones in her urine.  Patient is normal.  CT scan the abdomen pelvis shows resolving interstitial pancreatitis.  There is infiltration and atrophy of the pancreas possibly related to chronic pancreatitis or metabolic syndrome in the setting of marked hepatic steatosis.  The patient was admitted to hospital for observation for resuscitation and pain control. Patient underwent MRCP which was unremarkable.  It showed no residual active inflammation or duct dilatation.  Patient also underwent EGD which showed esophagitis.  It is recommended that patient start on Protonix.  Patient will be discharged home today in stable condition.  Patient will need close follow-up up with GI for continued abdominal pain and chronic intermittent pancreatitis.        DISCHARGE Follow Up Recommendations for labs and diagnostics:   Follow up with GI in 1 week  Follow up with pcp in 1 week       Day of Discharge     Vital Signs:  Temp:  [97 °F (36.1 °C)-98.5 °F (36.9 °C)] 98.1 °F (36.7 °C)  Heart Rate:  [51-70] 59  Resp:  [14-18] 18  BP: (103-148)/(58-91) 105/58  Flow (L/min):  [3] 3  Physical Exam:   General: Awake, alert, NAD resting in bed   HENT: NCAT, MMM  Eyes: pupils equal, no scleral icterus  Cardiovascular: RRR, no murmurs   Pulmonary: CTA bilaterally; no wheezes; no conversational dyspnea  Gastrointestinal: Soft, minimal tenderness to palpation   Musculoskeletal: No gross deformities  Skin: No jaundice, no rash on exposed skin appreciated  Neuro: CN II through XII grossly intact; speech clear; no tremor  Psych: Mood and affect appropriate  : No Grande  catheter; no suprapubic tenderness      Discharge Details        Discharge Medications        New Medications        Instructions Start Date   oxyCODONE 5 MG immediate release tablet  Commonly known as: Roxicodone   5 mg, Oral, Every 8 Hours PRN      pantoprazole 40 MG EC tablet  Commonly known as: Protonix   40 mg, Oral, Daily             Continue These Medications        Instructions Start Date   amitriptyline 25 MG tablet  Commonly known as: ELAVIL   Take 1 tablet by mouth Every Night.      Creon [id number] units capsule delayed-release particles capsule  Generic drug: Pancrelipase (Lip-Prot-Amyl)   36,000 units of lipase, Oral, Take As Directed, Take 2 capsule with every meal and 1 capsule with snacks      fenofibrate 145 MG tablet  Commonly known as: TRICOR   145 mg, Oral      levETIRAcetam 500 MG tablet  Commonly known as: KEPPRA   500 mg, Oral, 2 Times Daily      montelukast 10 MG tablet  Commonly known as: SINGULAIR   10 mg, Oral, Nightly      propranolol 60 MG tablet  Commonly known as: INDERAL   Take 1 tablet by mouth 2 (Two) Times a Day.      rivaroxaban 20 MG tablet  Commonly known as: XARELTO   20 mg, Oral, Daily      sertraline 50 MG tablet  Commonly known as: ZOLOFT   1 tablet, Oral, Daily      vitamin D 1.25 MG (75684 UT) capsule capsule  Commonly known as: ERGOCALCIFEROL   50,000 Units, Oral, Weekly, Monday             Stop These Medications      famotidine 40 MG tablet  Commonly known as: PEPCID     naproxen 500 MG tablet  Commonly known as: NAPROSYN              Allergies   Allergen Reactions   • Sumatriptan Shortness Of Breath   • Amoxicillin Hives     Hives and blisters   • Tylenol [Acetaminophen] Hives       Discharge Disposition:  Home or Self Care    Diet:  Hospital:  Diet Order   Procedures   • Diet: Liquid; Full Liquid; Fluid Consistency: Thin (IDDSI 0)       Discharge Activity: as tolerated       CODE STATUS:  Code Status and Medical Interventions:   Ordered at: 03/20/24 0843     Code  Status (Patient has no pulse and is not breathing):    CPR (Attempt to Resuscitate)     Medical Interventions (Patient has pulse or is breathing):    Full Support         No future appointments.        Pertinent  and/or Most Recent Results     PROCEDURES:   EGD    LAB RESULTS:      Lab 03/23/24  0625 03/20/24  0547 03/19/24  1834   WBC 8.00 11.69* 9.31   HEMOGLOBIN 14.0 14.5 16.8*   HEMATOCRIT 45.9 44.6 50.3*   PLATELETS 215 290 346   NEUTROS ABS  --  4.25 4.14   IMMATURE GRANS (ABS)  --  0.02 0.02   LYMPHS ABS  --  5.96* 4.01*   MONOS ABS  --  0.76 0.74   EOS ABS  --  0.65* 0.36   MCV 93.5 91.8 90.0         Lab 03/23/24  0625 03/20/24  0547 03/19/24  1834   SODIUM 142 137 138   POTASSIUM 4.4 4.1 4.9   CHLORIDE 104 105 103   CO2 29.2* 23.4 24.7   ANION GAP 8.8 8.6 10.3   BUN 6 7 10   CREATININE 0.83 0.69 0.76   EGFR 92.1 113.4 102.4   GLUCOSE 88 89 116*   CALCIUM 9.2 8.4* 9.7   MAGNESIUM 1.7 1.6  --          Lab 03/23/24  0625 03/20/24  0547 03/19/24  1834   TOTAL PROTEIN 6.4 6.1 7.5   ALBUMIN 3.9 3.8 4.5   GLOBULIN 2.5 2.3 3.0   ALT (SGPT) 57* 61* 84*   AST (SGOT) 39* 38* 52*   BILIRUBIN 0.7 0.4 0.4   ALK PHOS 73 71 88   LIPASE  --   --  33             Lab 03/23/24  0625   CHOLESTEROL 147   LDL CHOL 93   HDL CHOL 28*   TRIGLYCERIDES 148             Brief Urine Lab Results  (Last result in the past 365 days)        Color   Clarity   Blood   Leuk Est   Nitrite   Protein   CREAT   Urine HCG        03/19/24 1840 Yellow   Cloudy   Negative   Negative   Negative   Negative                 Microbiology Results (last 10 days)       ** No results found for the last 240 hours. **            MRI abdomen wo contrast mrcp    Result Date: 3/20/2024  Impression: 1. Unremarkable postcholecystectomy cholangiogram. 2. Lipomatous infiltration and atrophy of the pancreas without MRI findings of residual active inflammation. No main duct dilation or divisum morphology. 3. Marked hepatic steatosis. 4. No acute MRI findings.     Electronically Signed ByHolden Chi MD On:3/20/2024 9:59 PM      CT Abdomen Pelvis With Contrast    Result Date: 3/19/2024  Impression: 1. Resolving interstitial pancreatitis. No worsening inflammation or drainable collection. 2. Lipomatous infiltration and atrophy of the pancreas poss related to chronic pancreatitis or metabolic syndrome in the setting of marked hepatic steatosis. 3. Findings of possible median arcuate ligament syndrome in the right clinical setting. This is chronic in the setting of a prominent GDA and pancreaticoduodenal collaterals. 4. Punctate nonobstructing left upper pole renal calculus.   Electronically Signed ByHolden Chi MD On:3/19/2024 8:10 PM                    Labs Pending at Discharge:  Pending Labs       Order Current Status    Tissue Pathology Exam Collected (03/22/24 2511)              Time spent on Discharge including face to face service:  more than 35  minutes    Electronically signed by Maury Piña DO, 03/23/24, 10:43 AM EDT.

## 2024-03-23 NOTE — PLAN OF CARE
Goal Outcome Evaluation:  Progress: no change  Outcome Evaluation: VSS with BP being slighly hypotensive, lowest at 103/65, and bradycardic, lowest at 51. Pt received PRN Morphine Q4h. Following her first dose of PRN Morphine, pt stated she did not feel it was effective and a one time dose of Oxycodone 5 was given. Pt also did not feel this medication changed her pain and requested PRN Morphine when able to receive. Pt is hopeful to form a pain management regimen that will keep her pain tolerable.

## 2024-03-24 ENCOUNTER — READMISSION MANAGEMENT (OUTPATIENT)
Dept: CALL CENTER | Facility: HOSPITAL | Age: 39
End: 2024-03-24
Payer: MEDICARE

## 2024-03-24 NOTE — OUTREACH NOTE
Prep Survey      Flowsheet Row Responses   [facility] facility patient discharged from? [place]   Is LACE score < 7 ? No   Eligibility Readm Mgmt   Discharge diagnosis *Pancreatitis   Does the patient have one of the following disease processes/diagnoses(primary or secondary)? Other   Does the patient have Home health ordered? No   Is there a DME ordered? No   Prep survey completed? Yes            JAMAR STILL - Registered Nurse

## 2024-03-27 ENCOUNTER — TELEPHONE (OUTPATIENT)
Dept: GASTROENTEROLOGY | Facility: CLINIC | Age: 39
End: 2024-03-27
Payer: MEDICARE

## 2024-03-27 ENCOUNTER — READMISSION MANAGEMENT (OUTPATIENT)
Dept: CALL CENTER | Facility: HOSPITAL | Age: 39
End: 2024-03-27
Payer: MEDICARE

## 2024-03-27 NOTE — OUTREACH NOTE
Medical Week 1 Survey      Flowsheet Row Responses   [facility] patient discharged from? [place]   Does the patient have one of the following disease processes/diagnoses(primary or secondary)? Other   Week 1 attempt successful? No   Unsuccessful attempts Attempt 1            Ayaka GANDHI - Licensed Nurse

## 2024-03-27 NOTE — TELEPHONE ENCOUNTER
----- Message from April Ley sent at 3/27/2024 12:28 PM EDT -----    ----- Message -----  From: Thao Valles APRN  Sent: 3/27/2024  12:13 PM EDT  To: [facility] Clinical Pool    I have reviewed the patients upper endoscopy and pathology.  Esophageal biopsies normal.  Stomach biopsies show mild chronic inactive gastritis.  Biopsies are negative for H. Pylori, dysplasia, metaplasia, and malignancy.  Recommend continuing Protonix 40 mg daily.  Patient needs follow-up appointment in about 4 weeks.

## 2024-03-27 NOTE — TELEPHONE ENCOUNTER
Called pt. No Answer. Left message for pt to call back.    RE: EGD Results --- RESULT NOTES/In Basket    Postponing Result Note until tomorrow, 3.28.24

## 2024-03-28 ENCOUNTER — TELEPHONE (OUTPATIENT)
Dept: GASTROENTEROLOGY | Facility: CLINIC | Age: 39
End: 2024-03-28
Payer: MEDICARE

## 2024-03-28 NOTE — TELEPHONE ENCOUNTER
Caller: Tram García    Relationship: Self    Best call back number: [phone]    What is the best time to reach you: AFTERNOON    Who are you requesting to speak with (clinical staff, provider,  specific staff member): ANY    Do you know the name of the person who called: STEFANIE MORE    What was the call regarding: RESULTS    Is it okay if the provider responds through MyChart: NO, PHONE CALL

## 2024-04-04 ENCOUNTER — READMISSION MANAGEMENT (OUTPATIENT)
Dept: CALL CENTER | Facility: HOSPITAL | Age: 39
End: 2024-04-04
Payer: MEDICARE

## 2024-04-04 NOTE — OUTREACH NOTE
Medical Week 2 Survey      Flowsheet Row Responses   [facility] facility patient discharged from? [place]   Does the patient have one of the following disease processes/diagnoses(primary or secondary)? Other   Week 2 attempt successful? No   Unsuccessful attempts Attempt 1            Anh CAMPOS - Registered Nurse

## 2024-04-08 ENCOUNTER — READMISSION MANAGEMENT (OUTPATIENT)
Dept: CALL CENTER | Facility: HOSPITAL | Age: 39
End: 2024-04-08
Payer: MEDICARE

## 2024-04-08 NOTE — OUTREACH NOTE
Medical Week 2 Survey      Flowsheet Row Responses   [facility] facility patient discharged from? [place]   Does the patient have one of the following disease processes/diagnoses(primary or secondary)? Other   Week 2 attempt successful? No   Unsuccessful attempts Attempt 2            JOSE MIGUEL VELA - Registered Nurse

## 2024-04-18 ENCOUNTER — READMISSION MANAGEMENT (OUTPATIENT)
Dept: CALL CENTER | Facility: HOSPITAL | Age: 39
End: 2024-04-18
Payer: MEDICARE

## 2024-04-18 NOTE — OUTREACH NOTE
Medical Week 3 Survey      Flowsheet Row Responses   [facility] patient discharged from? [place]   Does the patient have one of the following disease processes/diagnoses(primary or secondary)? Other   Week 3 attempt successful? No   Unsuccessful attempts Attempt 1   Revoke Decline to participate            Lory COYNE - Licensed Nurse

## 2024-04-19 ENCOUNTER — TELEPHONE (OUTPATIENT)
Dept: GASTROENTEROLOGY | Facility: CLINIC | Age: 39
End: 2024-04-19
Payer: MEDICARE

## 2024-04-19 NOTE — TELEPHONE ENCOUNTER
----- Message from April Ley sent at 3/29/2024 12:41 PM EDT -----  S/w pt. Aware of results and recommendations. Pt aware we will schedule a f/u up in about 4 weeks. At this time, [facility] does not have an opening. Can pt be overbooked?

## 2024-04-22 ENCOUNTER — TELEPHONE (OUTPATIENT)
Dept: GASTROENTEROLOGY | Facility: CLINIC | Age: 39
End: 2024-04-22
Payer: MEDICARE

## 2024-04-29 NOTE — TELEPHONE ENCOUNTER
Called patient to schedule f/u. No answer. Left vm and call back number. Letter sent to patient due to not being able to contact

## 2024-05-30 ENCOUNTER — APPOINTMENT (OUTPATIENT)
Dept: CT IMAGING | Facility: HOSPITAL | Age: 39
End: 2024-05-30
Payer: MEDICARE

## 2024-05-30 ENCOUNTER — HOSPITAL ENCOUNTER (EMERGENCY)
Facility: HOSPITAL | Age: 39
Discharge: HOME | End: 2024-05-30
Attending: EMERGENCY MEDICINE
Payer: MEDICARE

## 2024-05-30 VITALS
OXYGEN SATURATION: 97 % | RESPIRATION RATE: 16 BRPM | SYSTOLIC BLOOD PRESSURE: 114 MMHG | WEIGHT: 218 LBS | TEMPERATURE: 98.2 F | DIASTOLIC BLOOD PRESSURE: 69 MMHG | HEIGHT: 62 IN | BODY MASS INDEX: 40.12 KG/M2 | HEART RATE: 75 BPM

## 2024-05-30 DIAGNOSIS — R10.9 ABDOMINAL PAIN, UNSPECIFIED ABDOMINAL LOCATION: Primary | ICD-10-CM

## 2024-05-30 LAB
ALBUMIN SERPL-MCNC: 4.4 G/DL (ref 3.5–5.2)
ALBUMIN/GLOB SERPL: 1.5 G/DL
ALP SERPL-CCNC: 86 U/L (ref 39–117)
ALT SERPL W P-5'-P-CCNC: 72 U/L (ref 1–33)
ANION GAP SERPL CALCULATED.3IONS-SCNC: 11.3 MMOL/L (ref 5–15)
AST SERPL-CCNC: 56 U/L (ref 1–32)
BASOPHILS # BLD AUTO: 0.09 10*3/MM3 (ref 0–0.2)
BASOPHILS NFR BLD AUTO: 1.1 % (ref 0–1.5)
BILIRUB SERPL-MCNC: 0.6 MG/DL (ref 0–1.2)
BILIRUB UR QL STRIP: NEGATIVE
BUN SERPL-MCNC: 9 MG/DL (ref 6–20)
BUN/CREAT SERPL: 13.2 (ref 7–25)
CALCIUM SPEC-SCNC: 9.3 MG/DL (ref 8.6–10.5)
CHLORIDE SERPL-SCNC: 103 MMOL/L (ref 98–107)
CLARITY UR: CLEAR
CO2 SERPL-SCNC: 22.7 MMOL/L (ref 22–29)
COLOR UR: YELLOW
CREAT SERPL-MCNC: 0.68 MG/DL (ref 0.57–1)
DEPRECATED RDW RBC AUTO: 43.4 FL (ref 37–54)
EGFRCR SERPLBLD CKD-EPI 2021: 113.8 ML/MIN/1.73
EOSINOPHIL # BLD AUTO: 0.65 10*3/MM3 (ref 0–0.4)
EOSINOPHIL NFR BLD AUTO: 7.8 % (ref 0.3–6.2)
ERYTHROCYTE [DISTWIDTH] IN BLOOD BY AUTOMATED COUNT: 13.5 % (ref 12.3–15.4)
GLOBULIN UR ELPH-MCNC: 3 GM/DL
GLUCOSE SERPL-MCNC: 104 MG/DL (ref 65–99)
GLUCOSE UR STRIP-MCNC: NEGATIVE MG/DL
HCG INTACT+B SERPL-ACNC: <0.5 MIU/ML
HCT VFR BLD AUTO: 50.6 % (ref 34–46.6)
HGB BLD-MCNC: 17 G/DL (ref 12–15.9)
HGB UR QL STRIP.AUTO: NEGATIVE
HOLD SPECIMEN: NORMAL
HOLD SPECIMEN: NORMAL
IMM GRANULOCYTES # BLD AUTO: 0.02 10*3/MM3 (ref 0–0.05)
IMM GRANULOCYTES NFR BLD AUTO: 0.2 % (ref 0–0.5)
KETONES UR QL STRIP: NEGATIVE
LEUKOCYTE ESTERASE UR QL STRIP.AUTO: NEGATIVE
LIPASE SERPL-CCNC: 30 U/L (ref 13–60)
LYMPHOCYTES # BLD AUTO: 3.94 10*3/MM3 (ref 0.7–3.1)
LYMPHOCYTES NFR BLD AUTO: 47.1 % (ref 19.6–45.3)
MCH RBC QN AUTO: 29.7 PG (ref 26.6–33)
MCHC RBC AUTO-ENTMCNC: 33.6 G/DL (ref 31.5–35.7)
MCV RBC AUTO: 88.5 FL (ref 79–97)
MONOCYTES # BLD AUTO: 0.71 10*3/MM3 (ref 0.1–0.9)
MONOCYTES NFR BLD AUTO: 8.5 % (ref 5–12)
NEUTROPHILS NFR BLD AUTO: 2.95 10*3/MM3 (ref 1.7–7)
NEUTROPHILS NFR BLD AUTO: 35.3 % (ref 42.7–76)
NITRITE UR QL STRIP: NEGATIVE
NRBC BLD AUTO-RTO: 0 /100 WBC (ref 0–0.2)
PH UR STRIP.AUTO: 5.5 [PH] (ref 5–8)
PLATELET # BLD AUTO: 276 10*3/MM3 (ref 140–450)
PMV BLD AUTO: 9.9 FL (ref 6–12)
POTASSIUM SERPL-SCNC: 4.2 MMOL/L (ref 3.5–5.2)
PROT SERPL-MCNC: 7.4 G/DL (ref 6–8.5)
PROT UR QL STRIP: NEGATIVE
RBC # BLD AUTO: 5.72 10*6/MM3 (ref 3.77–5.28)
SODIUM SERPL-SCNC: 137 MMOL/L (ref 136–145)
SP GR UR STRIP: 1.02 (ref 1–1.03)
UROBILINOGEN UR QL STRIP: NORMAL
WBC NRBC COR # BLD AUTO: 8.36 10*3/MM3 (ref 3.4–10.8)
WHOLE BLOOD HOLD COAG: NORMAL
WHOLE BLOOD HOLD SPECIMEN: NORMAL

## 2024-05-30 PROCEDURE — 84702 CHORIONIC GONADOTROPIN TEST: CPT | Performed by: EMERGENCY MEDICINE

## 2024-05-30 PROCEDURE — 25010000002 MORPHINE PER 10 MG: Performed by: EMERGENCY MEDICINE

## 2024-05-30 PROCEDURE — 81003 URINALYSIS AUTO W/O SCOPE: CPT | Performed by: EMERGENCY MEDICINE

## 2024-05-30 PROCEDURE — 83690 ASSAY OF LIPASE: CPT | Performed by: EMERGENCY MEDICINE

## 2024-05-30 PROCEDURE — 25010000002 KETOROLAC TROMETHAMINE PER 15 MG: Performed by: EMERGENCY MEDICINE

## 2024-05-30 PROCEDURE — 85025 COMPLETE CBC W/AUTO DIFF WBC: CPT | Performed by: EMERGENCY MEDICINE

## 2024-05-30 PROCEDURE — 36415 COLL VENOUS BLD VENIPUNCTURE: CPT

## 2024-05-30 PROCEDURE — 80053 COMPREHEN METABOLIC PANEL: CPT | Performed by: EMERGENCY MEDICINE

## 2024-05-30 PROCEDURE — 25510000001 IOPAMIDOL PER 1 ML: Performed by: EMERGENCY MEDICINE

## 2024-05-30 PROCEDURE — 99285 EMERGENCY DEPT VISIT HI MDM: CPT

## 2024-05-30 PROCEDURE — 96374 THER/PROPH/DIAG INJ IV PUSH: CPT

## 2024-05-30 PROCEDURE — 74177 CT ABD & PELVIS W/CONTRAST: CPT

## 2024-05-30 PROCEDURE — 96375 TX/PRO/DX INJ NEW DRUG ADDON: CPT

## 2024-05-30 RX ORDER — SODIUM CHLORIDE 0.9 % (FLUSH) 0.9 %
10 SYRINGE (ML) INJECTION AS NEEDED
Status: DISCONTINUED | OUTPATIENT
Start: 2024-05-30 | End: 2024-05-30 | Stop reason: HOSPADM

## 2024-05-30 RX ORDER — ONDANSETRON 4 MG/1
4 TABLET, ORALLY DISINTEGRATING ORAL EVERY 8 HOURS PRN
Qty: 15 TABLET | Refills: 0 | Status: SHIPPED | OUTPATIENT
Start: 2024-05-30

## 2024-05-30 RX ORDER — KETOROLAC TROMETHAMINE 30 MG/ML
30 INJECTION, SOLUTION INTRAMUSCULAR; INTRAVENOUS ONCE
Status: COMPLETED | OUTPATIENT
Start: 2024-05-30 | End: 2024-05-30

## 2024-05-30 RX ORDER — DICYCLOMINE HCL 20 MG
20 TABLET ORAL EVERY 6 HOURS
Qty: 20 TABLET | Refills: 0 | Status: SHIPPED | OUTPATIENT
Start: 2024-05-30

## 2024-05-30 RX ADMIN — KETOROLAC TROMETHAMINE 30 MG: 30 INJECTION, SOLUTION INTRAMUSCULAR; INTRAVENOUS at 16:20

## 2024-05-30 RX ADMIN — IOPAMIDOL 90 ML: 755 INJECTION, SOLUTION INTRAVENOUS at 17:26

## 2024-05-30 RX ADMIN — MORPHINE SULFATE 4 MG: 4 INJECTION, SOLUTION INTRAMUSCULAR; INTRAVENOUS at 16:29

## 2024-05-30 NOTE — ED PROVIDER NOTES
Time: 4:06 PM EDT  Date of encounter:  2024  Independent Historian/Clinical History and Information was obtained by:   Patient    History is limited by: N/A    Chief Complaint: Abdominal pain      History of Present Illness:  Patient is a 38 y.o. year old female who presents to the emergency department for evaluation of 3 days of abdominal pain accompanied by nausea.  Patient denies vomiting.  Patient has no chest pain or shortness of breath.  Patient has no cough hemoptysis.  Patient denies dysuria and urinary frequency.    HPI    Patient Care Team  Primary Care Provider: Tricia Esparza MD    Past Medical History:     Allergies   Allergen Reactions    Sumatriptan Shortness Of Breath    Amoxicillin Hives     Hives and blisters    Tylenol [Acetaminophen] Hives     Past Medical History:   Diagnosis Date    Abnormal ECG     Acid reflux     Anemia     Anxiety 2014    Asthma     Bleeding disorder     Cholelithiasis     Clotting disorder     Deep vein thrombosis     Depression     Foot sprain, right, initial encounter 10/21/2015    GERD (gastroesophageal reflux disease)     HTN (hypertension)     gestational    Migraine headache     Pancreatitis 2022    [facility] -INPATIENT    Pineal gland cyst 2014    Renal cyst 2014     Past Surgical History:   Procedure Laterality Date     SECTION      CHOLECYSTECTOMY      COLONOSCOPY N/A 2023    Procedure: COLONOSCOPY WITH BIOPSIES;  Surgeon: Feliz Olguin MD;  Location: [facility] ENDOSCOPY;  Service: Gastroenterology;  Laterality: N/A;  NORMAL COLONOSCOPY    ENDOSCOPY N/A 10/22/2021    Procedure: ESOPHAGOGASTRODUODENOSCOPY WITH BIOPSIES;  Surgeon: Feliz Olguin MD;  Location: [facility] ENDOSCOPY;  Service: Gastroenterology;  Laterality: N/A;  NORMAL EGD    ENDOSCOPY N/A 2023    Procedure: ESOPHAGOGASTRODUODENOSCOPY WITH BIOPSIES;  Surgeon: Feliz Olguin MD;  Location: [facility] ENDOSCOPY;  Service: Gastroenterology;   Laterality: N/A;  HIATAL HERNIA    ENDOSCOPY N/A 3/22/2024    Procedure: ESOPHAGOGASTRODUODENOSCOPY WITH BIOPSIES;  Surgeon: Feliz Olguin MD;  Location: [facility] ENDOSCOPY;  Service: Gastroenterology;  Laterality: N/A;  RETAINED FOOD IN THE STOMACH, EROSIVE GASTRITIS, REFLUX ESOPHAGITIS    LASER ABLATION      TUBAL ABDOMINAL LIGATION  03/27/2012     Family History   Problem Relation Age of Onset    Diabetes Mother     Arthritis Mother     Anxiety disorder Mother     Hyperlipidemia Mother     Liver disease Mother     Stroke Mother     Thyroid disease Mother     Other Father         cardio vascular disease    Diabetes Father     Kidney failure Father     Arthritis Father     Colon cancer Neg Hx        Home Medications:  Prior to Admission medications    Medication Sig Start Date End Date Taking? Authorizing Provider   amitriptyline (ELAVIL) 25 MG tablet Take 1 tablet by mouth Every Night. 9/15/23   Khari Mar MD   fenofibrate (TRICOR) 145 MG tablet Take 1 tablet by mouth. 11/28/23   Khari Mar MD   levETIRAcetam (KEPPRA) 500 MG tablet Take 1 tablet by mouth 2 (Two) Times a Day.    Khari Mar MD   montelukast (SINGULAIR) 10 MG tablet Take 1 tablet by mouth Every Night.    Khari Mar MD   Pancrelipase, Lip-Prot-Amyl, (Creon) 89268-941896 units capsule delayed-release particles capsule Take 1 capsule by mouth Take As Directed. Take 2 capsule with every meal and 1 capsule with snacks 10/20/23   Thao Valles APRN   propranolol (INDERAL) 60 MG tablet Take 1 tablet by mouth 2 (Two) Times a Day. 9/15/23   Khari Mar MD   rivaroxaban (XARELTO) 20 MG tablet Take 1 tablet by mouth Daily.    Khari Mar MD   sertraline (ZOLOFT) 50 MG tablet Take 1 tablet by mouth Daily. 1/3/23   Khari Mar MD   vitamin D (ERGOCALCIFEROL) 1.25 MG (56106 UT) capsule capsule Take 1 capsule by mouth 1 (One) Time Per Week. Monday    Khari Mar MD     "    Social History:   Social History     Tobacco Use    Smoking status: Former     Current packs/day: 0.00     Average packs/day: 1 pack/day for 15.0 years (15.0 ttl pk-yrs)     Types: Cigarettes     Start date: 2007     Quit date: 2022     Years since quittin.5     Passive exposure: Past    Smokeless tobacco: Never   Vaping Use    Vaping status: Never Used   Substance Use Topics    Alcohol use: Never    Drug use: Never         Review of Systems:  Review of Systems   Constitutional:  Negative for chills and fever.   HENT:  Negative for congestion, rhinorrhea and sore throat.    Eyes:  Negative for pain and visual disturbance.   Respiratory:  Negative for apnea, cough, chest tightness and shortness of breath.    Cardiovascular:  Negative for chest pain and palpitations.   Gastrointestinal:  Positive for abdominal pain. Negative for diarrhea, nausea and vomiting.   Genitourinary:  Negative for difficulty urinating and dysuria.   Musculoskeletal:  Negative for joint swelling and myalgias.   Skin:  Negative for color change.   Neurological:  Negative for seizures and headaches.   Psychiatric/Behavioral: Negative.     All other systems reviewed and are negative.       Physical Exam:  /69   Pulse 75   Temp 98.2 °F (36.8 °C) (Oral)   Resp 16   Ht 157.5 cm (62\")   Wt 98.9 kg (218 lb)   SpO2 97%   Breastfeeding No   BMI 39.87 kg/m²     Physical Exam  Vitals and nursing note reviewed.   Constitutional:       General: She is not in acute distress.     Appearance: Normal appearance. She is not toxic-appearing.   HENT:      Head: Normocephalic and atraumatic.      Jaw: There is normal jaw occlusion.   Eyes:      General: Lids are normal.      Extraocular Movements: Extraocular movements intact.      Conjunctiva/sclera: Conjunctivae normal.      Pupils: Pupils are equal, round, and reactive to light.   Cardiovascular:      Rate and Rhythm: Normal rate and regular rhythm.      Pulses: Normal pulses.      " Heart sounds: Normal heart sounds.   Pulmonary:      Effort: Pulmonary effort is normal. No respiratory distress.      Breath sounds: Normal breath sounds. No wheezing or rhonchi.   Abdominal:      General: Abdomen is flat.      Palpations: Abdomen is soft.      Tenderness: There is abdominal tenderness in the right lower quadrant. There is no guarding or rebound.   Musculoskeletal:         General: Normal range of motion.      Cervical back: Normal range of motion and neck supple.      Right lower leg: No edema.      Left lower leg: No edema.   Skin:     General: Skin is warm and dry.   Neurological:      Mental Status: She is alert and oriented to person, place, and time. Mental status is at baseline.   Psychiatric:         Mood and Affect: Mood normal.                  Procedures:  Procedures      Medical Decision Making:      Comorbidities that affect care:    Previous cholecystectomy    External Notes reviewed:    Hospital Discharge Summary: Patient was last admitted to the hospital for acute on chronic pancreatitis      The following orders were placed and all results were independently analyzed by me:  Orders Placed This Encounter   Procedures    CT Abdomen Pelvis With Contrast    [place] Draw    Comprehensive Metabolic Panel    Lipase    Urinalysis With Microscopic If Indicated (No Culture) - Urine, Clean Catch    hCG, Quantitative, Pregnancy    CBC Auto Differential    NPO Diet NPO Type: Strict NPO    Undress & Gown    Insert Peripheral IV    CBC & Differential    Green Top (Gel)    Lavender Top    Gold Top - SST    Light Blue Top       Medications Given in the Emergency Department:  Medications   sodium chloride 0.9 % flush 10 mL (has no administration in time range)   ketorolac (TORADOL) injection 30 mg (30 mg Intravenous Given 5/30/24 1620)   morphine injection 4 mg (4 mg Intravenous Given 5/30/24 1629)   iopamidol (ISOVUE-370) 76 % injection 100 mL (90 mL Intravenous Given 5/30/24 1726)        ED  Course:         Labs:    Lab Results (last 24 hours)       Procedure Component Value Units Date/Time    CBC & Differential [[id number]]  (Abnormal) Collected: 05/30/24 1545    Specimen: Blood Updated: 05/30/24 1554    Narrative:      The following orders were created for panel order CBC & Differential.  Procedure                               Abnormality         Status                     ---------                               -----------         ------                     CBC Auto Differential[[id number]]        Abnormal            Final result                 Please view results for these tests on the individual orders.    Comprehensive Metabolic Panel [[id number]]  (Abnormal) Collected: 05/30/24 1545    Specimen: Blood Updated: 05/30/24 1618     Glucose 104 mg/dL      BUN 9 mg/dL      Creatinine 0.68 mg/dL      Sodium 137 mmol/L      Potassium 4.2 mmol/L      Comment: Slight hemolysis detected by analyzer. Result may be falsely elevated.        Chloride 103 mmol/L      CO2 22.7 mmol/L      Calcium 9.3 mg/dL      Total Protein 7.4 g/dL      Albumin 4.4 g/dL      ALT (SGPT) 72 U/L      AST (SGOT) 56 U/L      Alkaline Phosphatase 86 U/L      Total Bilirubin 0.6 mg/dL      Globulin 3.0 gm/dL      A/G Ratio 1.5 g/dL      BUN/Creatinine Ratio 13.2     Anion Gap 11.3 mmol/L      eGFR 113.8 mL/min/1.73     Narrative:      GFR Normal >60  Chronic Kidney Disease <60  Kidney Failure <15      Lipase [[id number]]  (Normal) Collected: 05/30/24 1545    Specimen: Blood Updated: 05/30/24 1618     Lipase 30 U/L     hCG, Quantitative, Pregnancy [[id number]] Collected: 05/30/24 1545    Specimen: Blood Updated: 05/30/24 1612     HCG Quantitative <0.50 mIU/mL     Narrative:      HCG Ranges by Gestational Age    Females - non-pregnant premenopausal   </= 1mIU/mL HCG  Females - postmenopausal               </= 7mIU/mL HCG    3 Weeks       5.4   -      72 mIU/mL  4 Weeks      10.2   -     708 mIU/mL  5 Weeks       217   -   8,245 mIU/mL  6  Weeks       152   -  32,177 mIU/mL  7 Weeks     4,059   - 153,767 mIU/mL  8 Weeks    31,366   - 149,094 mIU/mL  9 Weeks    59,109   - 135,901 mIU/mL  10 Weeks   44,186   - 170,409 mIU/mL  12 Weeks   27,107   - 201,615 mIU/mL  14 Weeks   24,302   -  93,646 mIU/mL  15 Weeks   12,540   -  69,747 mIU/mL  16 Weeks    8,904   -  55,332 mIU/mL  17 Weeks    8,240   -  51,793 mIU/mL  18 Weeks    9,649   -  55,271 mIU/mL      CBC Auto Differential [[id number]]  (Abnormal) Collected: 05/30/24 1545    Specimen: Blood Updated: 05/30/24 1554     WBC 8.36 10*3/mm3      RBC 5.72 10*6/mm3      Hemoglobin 17.0 g/dL      Hematocrit 50.6 %      MCV 88.5 fL      MCH 29.7 pg      MCHC 33.6 g/dL      RDW 13.5 %      RDW-SD 43.4 fl      MPV 9.9 fL      Platelets 276 10*3/mm3      Neutrophil % 35.3 %      Lymphocyte % 47.1 %      Monocyte % 8.5 %      Eosinophil % 7.8 %      Basophil % 1.1 %      Immature Grans % 0.2 %      Neutrophils, Absolute 2.95 10*3/mm3      Lymphocytes, Absolute 3.94 10*3/mm3      Monocytes, Absolute 0.71 10*3/mm3      Eosinophils, Absolute 0.65 10*3/mm3      Basophils, Absolute 0.09 10*3/mm3      Immature Grans, Absolute 0.02 10*3/mm3      nRBC 0.0 /100 WBC     Urinalysis With Microscopic If Indicated (No Culture) - Urine, Clean Catch [[id number]]  (Normal) Collected: 05/30/24 1629    Specimen: Urine, Clean Catch Updated: 05/30/24 1640     Color, UA Yellow     Appearance, UA Clear     pH, UA 5.5     Specific Gravity, UA 1.017     Glucose, UA Negative     Ketones, UA Negative     Bilirubin, UA Negative     Blood, UA Negative     Protein, UA Negative     Leuk Esterase, UA Negative     Nitrite, UA Negative     Urobilinogen, UA 1.0 E.U./dL    Narrative:      Urine microscopic not indicated.             Imaging:    CT Abdomen Pelvis With Contrast    Result Date: 5/30/2024  CT ABDOMEN PELVIS W CONTRAST-  Date of Exam: 5/30/2024 5:13 PM  Indication: Abdominal pain. Epigastric pain  Comparison: CT scan of the abdomen and  pelvis 3/19/2024  Technique: Axial CT images were obtained of the abdomen and pelvis following the uneventful intravenous administration of Isovue-370, 90 cc. Reconstructed coronal and sagittal images were also obtained. Automated exposure control and iterative construction methods were used.  Findings: Subsegmental atelectasis and/are linear fibrosis at the lung bases is unchanged.  The liver is of normal size. The liver is of diffusely diminished density consistent with moderate fatty infiltration. The gallbladder is absent, surgical clips are seen in the gallbladder bed.  Heterogeneous fatty infiltration of the pancreas with atrophy is again appreciated. No pancreatic mass is evident. No inflammatory change is evident.  No adrenal mass is seen. The spleen is of normal size. The kidneys enhance bilaterally. Solitary tiny nonobstructing stone in the upper pole collecting system of the left kidney is stable. No ureteral stones are seen. There is no evidence of hydronephrosis. The urinary bladder is not abnormally distended.  The uterus measures 6.5 cm in greatest transverse dimension. No pelvic mass is seen. Clips are consistent with tubal ligation.  The stomach is not abnormally dilated. Bowel loops are not abnormally distended. No significant stool is seen. The appendix is normal.  Narrowing of the proximal celiac artery with poststenotic dilatation is stable.  The anterior abdominal wall is intact, no hernia is evident. Degenerative spurring is seen in the lower thoracic and lumbar spine.      Impression: CT scan of the abdomen and pelvis with IV contrast demonstrating heterogeneous fatty infiltration of the pancreas with atrophy, not significantly changed. No inflammatory change is evident.  Fatty liver.  Post cholecystectomy.  Narrowing of the proximal celiac artery with poststenotic dilatation is stable.  Solitary tiny nonobstructing calyceal calcification, upper pole, left kidney.    Electronically Signed  Gregorio JOHNS MD On:5/30/2024 5:50 PM         Differential Diagnosis and Discussion:    Abdominal Pain: Based on the patient's signs and symptoms, I considered abdominal aortic aneurysm, small bowel obstruction, pancreatitis, acute cholecystitis, acute appendecitis, peptic ulcer disease, gastritis, colitis, endocrine disorders, irritable bowel syndrome and other differential diagnosis an etiology of the patient's abdominal pain.    All labs were reviewed and interpreted by me.  CT scan radiology impression was interpreted by me.    MDM     The patient is resting comfortably and feels better, is alert and in no distress.  The patient´s CBC that was reviewed and interpreted by me shows no abnormalities of critical concern. Of note, there is no anemia requiring a blood transfusion and the platelet count is acceptable.  The patient´s CMP that was reviewed and interpretted by me shows no abnormalities of critical concern. Of note, the patient´s sodium and potassium are acceptable. The patient´s liver enzymes are unremarkable. The patient´s renal function (creatinine) is preserved. The patient has a normal anion gap.  CT scan of the abdomen pelvis negative for acute intra-abdominal pathology.  Repeat examination is unremarkable and benign; in particular, there's no discomfort at McBurney's point and there is no pulsatile mass. The history, exam, diagnostic testing, and current condition does not suggest acute appendicitis, bowel obstruction, acute cholecystitis, bowel perforation, major gastrointestinal bleeding, severe diverticulitis, abdominal aortic aneurysm, mesenteric ischemia, volvulus, sepsis, or other significant pathology that warrants further testing, continued ED treatment, admission, for surgical evaluation at this point. The vital signs have been stable. The patient does not have uncontrollable pain, intractable vomiting, or other significant symptoms. The patient's condition is stable and appropriate for  discharge from the emergency department.          Patient Care Considerations:    ANTIBIOTICS: I considered prescribing antibiotics as an outpatient however no bacterial focus of infection was found.      Consultants/Shared Management Plan:    None    Social Determinants of Health:    Patient is independent, reliable, and has access to care.       Disposition and Care Coordination:    Discharged: I considered escalation of care by admitting this patient to the hospital, however patient reports cessation of her abdominal pain.    I have explained the patient´s condition, diagnoses and treatment plan based on the information available to me at this time. I have answered questions and addressed any concerns. The patient has a good  understanding of the patient´s diagnosis, condition, and treatment plan as can be expected at this point. The vital signs have been stable. The patient´s condition is stable and appropriate for discharge from the emergency department.      The patient will pursue further outpatient evaluation with the primary care physician or other designated or consulting physician as outlined in the discharge instructions. They are agreeable to this plan of care and follow-up instructions have been explained in detail. The patient has received these instructions in written format and has expressed an understanding of the discharge instructions. The patient is aware that any significant change in condition or worsening of symptoms should prompt an immediate return to this or the closest emergency department or call to 911.  I have explained discharge medications and the need for follow up with the patient/caretakers. This was also printed in the discharge instructions. Patient was discharged with the following medications and follow up:      Medication List        New Prescriptions      dicyclomine 20 MG tablet  Commonly known as: BENTYL  Take 1 tablet by mouth Every 6 (Six) Hours.     ondansetron ODT 4 MG  disintegrating tablet  Commonly known as: ZOFRAN-ODT  Place 1 tablet on the tongue Every 8 (Eight) Hours As Needed for Nausea or Vomiting.               Where to Get Your Medications        These medications were sent to [facility] - [place], [place] - [address] - [phone]  - [phone] FX  [address] [facility] [place] [address]      Phone: [phone]   dicyclomine 20 MG tablet  ondansetron ODT 4 MG disintegrating tablet      Tricia Esparza MD  [address] [place] [address] [id number] [phone]             Final diagnoses:   Abdominal pain, unspecified abdominal location        ED Disposition       ED Disposition   Discharge    Condition   Stable    Comment   --               This medical record created using voice recognition software.             Antoni Elizondo MD  05/30/24 [id number]

## 2024-07-24 LAB
ALBUMIN SERPL-MCNC: 4.6 G/DL (ref 3.5–5.2)
ALBUMIN/GLOB SERPL: 1.7 G/DL
ALP SERPL-CCNC: 96 U/L (ref 39–117)
ALT SERPL W P-5'-P-CCNC: 75 U/L (ref 1–33)
ANION GAP SERPL CALCULATED.3IONS-SCNC: 12.5 MMOL/L (ref 5–15)
AST SERPL-CCNC: 57 U/L (ref 1–32)
BASOPHILS # BLD AUTO: 0.07 10*3/MM3 (ref 0–0.2)
BASOPHILS NFR BLD AUTO: 0.7 % (ref 0–1.5)
BILIRUB SERPL-MCNC: 0.6 MG/DL (ref 0–1.2)
BILIRUB UR QL STRIP: NEGATIVE
BUN SERPL-MCNC: 8 MG/DL (ref 6–20)
BUN/CREAT SERPL: 11.3 (ref 7–25)
CALCIUM SPEC-SCNC: 9.4 MG/DL (ref 8.6–10.5)
CHLORIDE SERPL-SCNC: 102 MMOL/L (ref 98–107)
CLARITY UR: CLEAR
CO2 SERPL-SCNC: 22.5 MMOL/L (ref 22–29)
COLOR UR: YELLOW
CREAT SERPL-MCNC: 0.71 MG/DL (ref 0.57–1)
D-LACTATE SERPL-SCNC: 1.4 MMOL/L (ref 0.5–2)
DEPRECATED RDW RBC AUTO: 44.7 FL (ref 37–54)
EGFRCR SERPLBLD CKD-EPI 2021: 111.1 ML/MIN/1.73
EOSINOPHIL # BLD AUTO: 0.56 10*3/MM3 (ref 0–0.4)
EOSINOPHIL NFR BLD AUTO: 5.3 % (ref 0.3–6.2)
ERYTHROCYTE [DISTWIDTH] IN BLOOD BY AUTOMATED COUNT: 13.8 % (ref 12.3–15.4)
GLOBULIN UR ELPH-MCNC: 2.7 GM/DL
GLUCOSE SERPL-MCNC: 96 MG/DL (ref 65–99)
GLUCOSE UR STRIP-MCNC: NEGATIVE MG/DL
HCG INTACT+B SERPL-ACNC: <0.5 MIU/ML
HCT VFR BLD AUTO: 52.6 % (ref 34–46.6)
HGB BLD-MCNC: 17.9 G/DL (ref 12–15.9)
HGB UR QL STRIP.AUTO: NEGATIVE
HOLD SPECIMEN: NORMAL
HOLD SPECIMEN: NORMAL
IMM GRANULOCYTES # BLD AUTO: 0.03 10*3/MM3 (ref 0–0.05)
IMM GRANULOCYTES NFR BLD AUTO: 0.3 % (ref 0–0.5)
KETONES UR QL STRIP: NEGATIVE
LEUKOCYTE ESTERASE UR QL STRIP.AUTO: NEGATIVE
LIPASE SERPL-CCNC: 27 U/L (ref 13–60)
LYMPHOCYTES # BLD AUTO: 4.52 10*3/MM3 (ref 0.7–3.1)
LYMPHOCYTES NFR BLD AUTO: 42.8 % (ref 19.6–45.3)
MCH RBC QN AUTO: 30.1 PG (ref 26.6–33)
MCHC RBC AUTO-ENTMCNC: 34 G/DL (ref 31.5–35.7)
MCV RBC AUTO: 88.4 FL (ref 79–97)
MONOCYTES # BLD AUTO: 0.67 10*3/MM3 (ref 0.1–0.9)
MONOCYTES NFR BLD AUTO: 6.3 % (ref 5–12)
NEUTROPHILS NFR BLD AUTO: 4.71 10*3/MM3 (ref 1.7–7)
NEUTROPHILS NFR BLD AUTO: 44.6 % (ref 42.7–76)
NITRITE UR QL STRIP: NEGATIVE
NRBC BLD AUTO-RTO: 0 /100 WBC (ref 0–0.2)
PH UR STRIP.AUTO: 6 [PH] (ref 5–8)
PLATELET # BLD AUTO: 273 10*3/MM3 (ref 140–450)
PMV BLD AUTO: 9.8 FL (ref 6–12)
POTASSIUM SERPL-SCNC: 3.9 MMOL/L (ref 3.5–5.2)
PROT SERPL-MCNC: 7.3 G/DL (ref 6–8.5)
PROT UR QL STRIP: NEGATIVE
RBC # BLD AUTO: 5.95 10*6/MM3 (ref 3.77–5.28)
SODIUM SERPL-SCNC: 137 MMOL/L (ref 136–145)
SP GR UR STRIP: 1.02 (ref 1–1.03)
UROBILINOGEN UR QL STRIP: NORMAL
WBC NRBC COR # BLD AUTO: 10.56 10*3/MM3 (ref 3.4–10.8)
WHOLE BLOOD HOLD COAG: NORMAL
WHOLE BLOOD HOLD SPECIMEN: NORMAL

## 2024-07-24 PROCEDURE — 87040 BLOOD CULTURE FOR BACTERIA: CPT

## 2024-07-24 PROCEDURE — 99285 EMERGENCY DEPT VISIT HI MDM: CPT

## 2024-07-24 PROCEDURE — 83605 ASSAY OF LACTIC ACID: CPT

## 2024-07-24 PROCEDURE — 81003 URINALYSIS AUTO W/O SCOPE: CPT

## 2024-07-24 PROCEDURE — 83690 ASSAY OF LIPASE: CPT

## 2024-07-24 PROCEDURE — 36415 COLL VENOUS BLD VENIPUNCTURE: CPT

## 2024-07-24 PROCEDURE — 80053 COMPREHEN METABOLIC PANEL: CPT

## 2024-07-24 PROCEDURE — 85025 COMPLETE CBC W/AUTO DIFF WBC: CPT

## 2024-07-24 PROCEDURE — 84702 CHORIONIC GONADOTROPIN TEST: CPT

## 2024-07-24 RX ORDER — SODIUM CHLORIDE 0.9 % (FLUSH) 0.9 %
10 SYRINGE (ML) INJECTION AS NEEDED
Status: DISCONTINUED | OUTPATIENT
Start: 2024-07-24 | End: 2024-07-25 | Stop reason: HOSPADM

## 2024-07-25 ENCOUNTER — HOSPITAL ENCOUNTER (EMERGENCY)
Facility: HOSPITAL | Age: 39
Discharge: HOME | End: 2024-07-25
Attending: EMERGENCY MEDICINE
Payer: MEDICARE

## 2024-07-25 ENCOUNTER — APPOINTMENT (OUTPATIENT)
Dept: CT IMAGING | Facility: HOSPITAL | Age: 39
End: 2024-07-25
Payer: MEDICARE

## 2024-07-25 ENCOUNTER — APPOINTMENT (OUTPATIENT)
Dept: GENERAL RADIOLOGY | Facility: HOSPITAL | Age: 39
End: 2024-07-25
Payer: MEDICARE

## 2024-07-25 VITALS
TEMPERATURE: 98.6 F | RESPIRATION RATE: 18 BRPM | DIASTOLIC BLOOD PRESSURE: 72 MMHG | HEIGHT: 62 IN | BODY MASS INDEX: 40.49 KG/M2 | SYSTOLIC BLOOD PRESSURE: 110 MMHG | HEART RATE: 80 BPM | WEIGHT: 220.02 LBS | OXYGEN SATURATION: 96 %

## 2024-07-25 DIAGNOSIS — R10.9 LEFT SIDED ABDOMINAL PAIN OF UNKNOWN CAUSE: Primary | ICD-10-CM

## 2024-07-25 DIAGNOSIS — R10.9 FLANK PAIN: ICD-10-CM

## 2024-07-25 PROCEDURE — 25010000002 KETOROLAC TROMETHAMINE PER 15 MG: Performed by: NURSE PRACTITIONER

## 2024-07-25 PROCEDURE — 74018 RADEX ABDOMEN 1 VIEW: CPT

## 2024-07-25 PROCEDURE — 25510000001 IOPAMIDOL PER 1 ML: Performed by: EMERGENCY MEDICINE

## 2024-07-25 PROCEDURE — 96375 TX/PRO/DX INJ NEW DRUG ADDON: CPT

## 2024-07-25 PROCEDURE — 74177 CT ABD & PELVIS W/CONTRAST: CPT

## 2024-07-25 PROCEDURE — 25010000002 ONDANSETRON PER 1 MG: Performed by: NURSE PRACTITIONER

## 2024-07-25 PROCEDURE — 96374 THER/PROPH/DIAG INJ IV PUSH: CPT

## 2024-07-25 RX ORDER — ONDANSETRON 2 MG/ML
4 INJECTION INTRAMUSCULAR; INTRAVENOUS ONCE
Status: COMPLETED | OUTPATIENT
Start: 2024-07-25 | End: 2024-07-25

## 2024-07-25 RX ORDER — CYCLOBENZAPRINE HCL 10 MG
10 TABLET ORAL 3 TIMES DAILY PRN
Qty: 15 TABLET | Refills: 0 | Status: SHIPPED | OUTPATIENT
Start: 2024-07-25

## 2024-07-25 RX ORDER — DICYCLOMINE HYDROCHLORIDE 10 MG/1
20 CAPSULE ORAL ONCE
Status: COMPLETED | OUTPATIENT
Start: 2024-07-25 | End: 2024-07-25

## 2024-07-25 RX ORDER — TRAMADOL HYDROCHLORIDE 50 MG/1
50 TABLET ORAL ONCE
Status: COMPLETED | OUTPATIENT
Start: 2024-07-25 | End: 2024-07-25

## 2024-07-25 RX ORDER — DICYCLOMINE HCL 20 MG
20 TABLET ORAL EVERY 6 HOURS PRN
Qty: 30 TABLET | Refills: 0 | Status: SHIPPED | OUTPATIENT
Start: 2024-07-25

## 2024-07-25 RX ORDER — ONDANSETRON 4 MG/1
4 TABLET, ORALLY DISINTEGRATING ORAL 4 TIMES DAILY PRN
Qty: 15 TABLET | Refills: 0 | Status: SHIPPED | OUTPATIENT
Start: 2024-07-25

## 2024-07-25 RX ORDER — KETOROLAC TROMETHAMINE 30 MG/ML
30 INJECTION, SOLUTION INTRAMUSCULAR; INTRAVENOUS ONCE
Status: COMPLETED | OUTPATIENT
Start: 2024-07-25 | End: 2024-07-25

## 2024-07-25 RX ADMIN — ONDANSETRON 4 MG: 2 INJECTION INTRAMUSCULAR; INTRAVENOUS at 03:54

## 2024-07-25 RX ADMIN — DICYCLOMINE HYDROCHLORIDE 20 MG: 10 CAPSULE ORAL at 06:03

## 2024-07-25 RX ADMIN — IOPAMIDOL 100 ML: 755 INJECTION, SOLUTION INTRAVENOUS at 05:09

## 2024-07-25 RX ADMIN — TRAMADOL HYDROCHLORIDE 50 MG: 50 TABLET ORAL at 06:04

## 2024-07-25 RX ADMIN — KETOROLAC TROMETHAMINE 30 MG: 30 INJECTION, SOLUTION INTRAMUSCULAR; INTRAVENOUS at 03:54

## 2024-07-25 NOTE — ED PROVIDER NOTES
Time: 3:20 AM EDT  Date of encounter:  2024  Independent Historian/Clinical History and Information was obtained by:   Patient and Family    History is limited by: N/A    Chief Complaint: ab and flank pain      History of Present Illness:  Patient is a 38 y.o. year old female who presents to the emergency department for evaluation of sudden onset sharp and dull left side pain that randomly radiates into left flank pain is a constant 4 or 5 and then gets periods of shooting sharp pain that goes up to about 8 or 9.  Pain started suddenly after she ate supper tonight.  No fever.  Mild nausea.  No diarrhea or vomiting.  No dysuria.  Patient denies any pelvic pain including no bleeding or discharge as well.  Pain is worse with movement and touch    HPI    Patient Care Team  Primary Care Provider: Tricia Espazra MD    Past Medical History:     Allergies   Allergen Reactions    Sumatriptan Shortness Of Breath    Amoxicillin Hives     Hives and blisters    Tylenol [Acetaminophen] Hives     Past Medical History:   Diagnosis Date    Abnormal ECG     Acid reflux     Anemia     Anxiety 2014    Asthma     Bleeding disorder     Cholelithiasis     Clotting disorder     Deep vein thrombosis 2018    Depression     Foot sprain, right, initial encounter 10/21/2015    GERD (gastroesophageal reflux disease)     HTN (hypertension)     gestational    Migraine headache     Pancreatitis 2022    [facility] -INPATIENT    Pineal gland cyst 2014    Renal cyst 2014     Past Surgical History:   Procedure Laterality Date     SECTION      CHOLECYSTECTOMY      COLONOSCOPY N/A 2023    Procedure: COLONOSCOPY WITH BIOPSIES;  Surgeon: Feliz Olguin MD;  Location: [facility] ENDOSCOPY;  Service: Gastroenterology;  Laterality: N/A;  NORMAL COLONOSCOPY    ENDOSCOPY N/A 10/22/2021    Procedure: ESOPHAGOGASTRODUODENOSCOPY WITH BIOPSIES;  Surgeon: Feliz Olguin MD;  Location: [facility] ENDOSCOPY;  Service:  Gastroenterology;  Laterality: N/A;  NORMAL EGD    ENDOSCOPY N/A 06/02/2023    Procedure: ESOPHAGOGASTRODUODENOSCOPY WITH BIOPSIES;  Surgeon: Feliz Olguin MD;  Location: [facility] ENDOSCOPY;  Service: Gastroenterology;  Laterality: N/A;  HIATAL HERNIA    ENDOSCOPY N/A 3/22/2024    Procedure: ESOPHAGOGASTRODUODENOSCOPY WITH BIOPSIES;  Surgeon: Feliz Olguin MD;  Location: [facility] ENDOSCOPY;  Service: Gastroenterology;  Laterality: N/A;  RETAINED FOOD IN THE STOMACH, EROSIVE GASTRITIS, REFLUX ESOPHAGITIS    LASER ABLATION      TUBAL ABDOMINAL LIGATION  03/27/2012     Family History   Problem Relation Age of Onset    Diabetes Mother     Arthritis Mother     Anxiety disorder Mother     Hyperlipidemia Mother     Liver disease Mother     Stroke Mother     Thyroid disease Mother     Other Father         cardio vascular disease    Diabetes Father     Kidney failure Father     Arthritis Father     Colon cancer Neg Hx        Home Medications:  Prior to Admission medications    Medication Sig Start Date End Date Taking? Authorizing Provider   amitriptyline (ELAVIL) 25 MG tablet Take 1 tablet by mouth Every Night. 9/15/23   Khari Mar MD   dicyclomine (BENTYL) 20 MG tablet Take 1 tablet by mouth Every 6 (Six) Hours. 5/30/24   Antoni Elizondo MD   fenofibrate (TRICOR) 145 MG tablet Take 1 tablet by mouth. 11/28/23   Khari Mar MD   levETIRAcetam (KEPPRA) 500 MG tablet Take 1 tablet by mouth 2 (Two) Times a Day.    Khari Mar MD   montelukast (SINGULAIR) 10 MG tablet Take 1 tablet by mouth Every Night.    Khari Mar MD   ondansetron ODT (ZOFRAN-ODT) 4 MG disintegrating tablet Place 1 tablet on the tongue Every 8 (Eight) Hours As Needed for Nausea or Vomiting. 5/30/24   Antoni Elizondo MD   Pancrelipase, Lip-Prot-Amyl, (Creon) 82910-165704 units capsule delayed-release particles capsule Take 1 capsule by mouth Take As Directed. Take 2 capsule with every meal and 1  "capsule with snacks 10/20/23   Thao Valles, APRN   propranolol (INDERAL) 60 MG tablet Take 1 tablet by mouth 2 (Two) Times a Day. 9/15/23   Khari Mar MD   rivaroxaban (XARELTO) 20 MG tablet Take 1 tablet by mouth Daily.    Khari Mar MD   sertraline (ZOLOFT) 50 MG tablet Take 1 tablet by mouth Daily. 1/3/23   Khari Mar MD   vitamin D (ERGOCALCIFEROL) 1.25 MG (93883 UT) capsule capsule Take 1 capsule by mouth 1 (One) Time Per Week. Monday    Khari Mar MD        Social History:   Social History     Tobacco Use    Smoking status: Former     Current packs/day: 0.00     Average packs/day: 1 pack/day for 15.0 years (15.0 ttl pk-yrs)     Types: Cigarettes     Start date: 2007     Quit date: 2022     Years since quittin.7     Passive exposure: Past    Smokeless tobacco: Never   Vaping Use    Vaping status: Never Used   Substance Use Topics    Alcohol use: Never    Drug use: Never         Review of Systems:  Review of Systems   Constitutional:  Negative for chills and fever.   HENT:  Negative for congestion, ear pain and sore throat.    Eyes:  Negative for pain.   Respiratory:  Negative for cough, chest tightness and shortness of breath.    Cardiovascular:  Negative for chest pain.   Gastrointestinal:  Positive for abdominal pain and nausea. Negative for constipation, diarrhea and vomiting.   Genitourinary:  Negative for dysuria, flank pain and hematuria.   Musculoskeletal:  Negative for back pain and joint swelling.   Skin:  Negative for pallor and rash.   Neurological:  Negative for seizures and headaches.   Hematological: Negative.    Psychiatric/Behavioral: Negative.     All other systems reviewed and are negative.       Physical Exam:  /71   Pulse 84   Temp 98.7 °F (37.1 °C) (Oral)   Resp 21   Ht 157.5 cm (62\")   Wt 99.8 kg (220 lb 0.3 oz)   SpO2 94%   BMI 40.24 kg/m²     Physical Exam  Vitals and nursing note reviewed.   Constitutional:       " General: She is not in acute distress.     Appearance: Normal appearance. She is obese. She is not toxic-appearing.   HENT:      Head: Normocephalic and atraumatic.      Mouth/Throat:      Mouth: Mucous membranes are moist.   Eyes:      General: No scleral icterus.  Cardiovascular:      Rate and Rhythm: Normal rate and regular rhythm.      Pulses: Normal pulses.      Heart sounds: Normal heart sounds.   Pulmonary:      Effort: Pulmonary effort is normal. No respiratory distress.      Breath sounds: Normal breath sounds.   Abdominal:      General: Bowel sounds are normal. There is no distension.      Palpations: Abdomen is soft.      Tenderness: There is no abdominal tenderness in the left upper quadrant and left lower quadrant. There is left CVA tenderness. There is no right CVA tenderness.   Musculoskeletal:         General: Normal range of motion.      Cervical back: Normal range of motion and neck supple.   Skin:     General: Skin is warm and dry.   Neurological:      Mental Status: She is alert and oriented to person, place, and time. Mental status is at baseline.   Psychiatric:         Mood and Affect: Mood normal.         Behavior: Behavior normal.              Medical Decision Making:      Comorbidities that affect care:    Asthma, Hypertension    External Notes reviewed:    Previous ED Note: Patient seen at a different emergency department at [place] on June 4 for pleuritic chest pain      The following orders were placed and all results were independently analyzed by me:  Orders Placed This Encounter   Procedures    Blood Culture - Blood,    Blood Culture - Blood,    XR Abdomen KUB    CT Abdomen Pelvis With Contrast    Muir Draw    Comprehensive Metabolic Panel    Lipase    Urinalysis With Microscopic If Indicated (No Culture) - Urine, Clean Catch    hCG, Quantitative, Pregnancy    Lactic Acid, Plasma    CBC Auto Differential    NPO Diet NPO Type: Strict NPO    Undress & Gown    Undress & Gown     Continuous Pulse Oximetry    Vital Signs    Oxygen Therapy- Nasal Cannula; Titrate 1-6 LPM Per SpO2; 90 - 95%    Insert Peripheral IV    Insert Peripheral IV    CBC & Differential    Green Top (Gel)    Lavender Top    Gold Top - SST    Light Blue Top       Medications Given in the Emergency Department:  Medications   sodium chloride 0.9 % flush 10 mL (has no administration in time range)   sodium chloride 0.9 % flush 10 mL (has no administration in time range)   dicyclomine (BENTYL) capsule 20 mg (has no administration in time range)   traMADol (ULTRAM) tablet 50 mg (has no administration in time range)   ketorolac (TORADOL) injection 30 mg (30 mg Intravenous Given 7/25/24 0354)   ondansetron (ZOFRAN) injection 4 mg (4 mg Intravenous Given 7/25/24 0354)   iopamidol (ISOVUE-370) 76 % injection 100 mL (100 mL Intravenous Given 7/25/24 0509)        ED Course:    ED Course as of 07/25/24 0600   Thu Jul 25, 2024   0334 XR Abdomen KUB  Neg xray [DS]   0547 CT Abdomen Pelvis With Contrast  No acute findings [DS]      ED Course User Index  [DS] Radha Amato, BOSTON       Labs:    Lab Results (last 24 hours)       Procedure Component Value Units Date/Time    CBC & Differential [[id number]]  (Abnormal) Collected: 07/24/24 2249    Specimen: Blood from Arm, Right Updated: 07/24/24 2300    Narrative:      The following orders were created for panel order CBC & Differential.  Procedure                               Abnormality         Status                     ---------                               -----------         ------                     CBC Auto Differential[[id number]]        Abnormal            Final result                 Please view results for these tests on the individual orders.    Comprehensive Metabolic Panel [[id number]]  (Abnormal) Collected: 07/24/24 2249    Specimen: Blood from Arm, Right Updated: 07/24/24 2322     Glucose 96 mg/dL      BUN 8 mg/dL      Creatinine 0.71 mg/dL      Sodium 137 mmol/L      Potassium  3.9 mmol/L      Comment: Slight hemolysis detected by analyzer. Result may be falsely elevated.        Chloride 102 mmol/L      CO2 22.5 mmol/L      Calcium 9.4 mg/dL      Total Protein 7.3 g/dL      Albumin 4.6 g/dL      ALT (SGPT) 75 U/L      AST (SGOT) 57 U/L      Alkaline Phosphatase 96 U/L      Total Bilirubin 0.6 mg/dL      Globulin 2.7 gm/dL      A/G Ratio 1.7 g/dL      BUN/Creatinine Ratio 11.3     Anion Gap 12.5 mmol/L      eGFR 111.1 mL/min/1.73     Narrative:      GFR Normal >60  Chronic Kidney Disease <60  Kidney Failure <15      Lipase [[id number]]  (Normal) Collected: 07/24/24 2249    Specimen: Blood from Arm, Right Updated: 07/24/24 2322     Lipase 27 U/L     hCG, Quantitative, Pregnancy [[id number]] Collected: 07/24/24 2249    Specimen: Blood from Arm, Right Updated: 07/24/24 2317     HCG Quantitative <0.50 mIU/mL     Narrative:      HCG Ranges by Gestational Age    Females - non-pregnant premenopausal   </= 1mIU/mL HCG  Females - postmenopausal               </= 7mIU/mL HCG    3 Weeks       5.4   -      72 mIU/mL  4 Weeks      10.2   -     708 mIU/mL  5 Weeks       217   -   8,245 mIU/mL  6 Weeks       152   -  32,177 mIU/mL  7 Weeks     4,059   - 153,767 mIU/mL  8 Weeks    31,366   - 149,094 mIU/mL  9 Weeks    59,109   - 135,901 mIU/mL  10 Weeks   44,186   - 170,409 mIU/mL  12 Weeks   27,107   - 201,615 mIU/mL  14 Weeks   24,302   -  93,646 mIU/mL  15 Weeks   12,540   -  69,747 mIU/mL  16 Weeks    8,904   -  55,332 mIU/mL  17 Weeks    8,240   -  51,793 mIU/mL  18 Weeks    9,649   -  55,271 mIU/mL      Lactic Acid, Plasma [[id number]]  (Normal) Collected: 07/24/24 2249    Specimen: Blood from Arm, Right Updated: 07/24/24 2320     Lactate 1.4 mmol/L     Blood Culture - Blood, Arm, Left [[id number]] Collected: 07/24/24 2249    Specimen: Blood from Arm, Left Updated: 07/24/24 2258    Blood Culture - Blood, Arm, Right [[id number]] Collected: 07/24/24 2249    Specimen: Blood from Arm, Right Updated: 07/24/24  2258    CBC Auto Differential [[id number]]  (Abnormal) Collected: 07/24/24 2249    Specimen: Blood from Arm, Right Updated: 07/24/24 2300     WBC 10.56 10*3/mm3      RBC 5.95 10*6/mm3      Hemoglobin 17.9 g/dL      Hematocrit 52.6 %      MCV 88.4 fL      MCH 30.1 pg      MCHC 34.0 g/dL      RDW 13.8 %      RDW-SD 44.7 fl      MPV 9.8 fL      Platelets 273 10*3/mm3      Neutrophil % 44.6 %      Lymphocyte % 42.8 %      Monocyte % 6.3 %      Eosinophil % 5.3 %      Basophil % 0.7 %      Immature Grans % 0.3 %      Neutrophils, Absolute 4.71 10*3/mm3      Lymphocytes, Absolute 4.52 10*3/mm3      Monocytes, Absolute 0.67 10*3/mm3      Eosinophils, Absolute 0.56 10*3/mm3      Basophils, Absolute 0.07 10*3/mm3      Immature Grans, Absolute 0.03 10*3/mm3      nRBC 0.0 /100 WBC     Urinalysis With Microscopic If Indicated (No Culture) - Urine, Clean Catch [[id number]]  (Normal) Collected: 07/24/24 2301    Specimen: Urine, Clean Catch Updated: 07/24/24 2310     Color, UA Yellow     Appearance, UA Clear     pH, UA 6.0     Specific Gravity, UA 1.023     Glucose, UA Negative     Ketones, UA Negative     Bilirubin, UA Negative     Blood, UA Negative     Protein, UA Negative     Leuk Esterase, UA Negative     Nitrite, UA Negative     Urobilinogen, UA 1.0 E.U./dL    Narrative:      Urine microscopic not indicated.             Imaging:    CT Abdomen Pelvis With Contrast    Result Date: 7/25/2024  CT ABDOMEN PELVIS W CONTRAST Date of Exam: 7/25/2024 4:53 AM EDT Indication: Left abdominal and flank pain with nausea. Comparison: 5/30/2024 Technique: Axial CT images were obtained of the abdomen and pelvis after the uneventful intravenous administration of iodinated contrast. Reconstructed coronal and sagittal images were also obtained. Automated exposure control and iterative construction methods were used. Findings: There is chronic scarring/atelectasis in the lung bases, similar to prior. Aorta is normal in caliber. Gallbladder is  surgically absent. No biliary obstruction is seen. There is diffuse hepatic steatosis. There is fatty atrophy of the pancreas. There is a tiny nonobstructing stone in the upper pole of the left kidney. Solid abdominal organs are otherwise normal. The kidneys are nonobstructed. Urinary bladder is normal. Patient is status post tubal ligation. Solid pelvic organs are within normal limits. Multiple phleboliths are present in the pelvis. The appendix is normal. Large bowel is normal with no evidence of colitis. No small bowel obstruction is seen. Stomach is normal. There is no adenopathy or free fluid.     1. No acute findings in the abdomen or pelvis. 2. Tiny nonobstructing left kidney stone. No ureteral stones or hydronephrosis. 3. Tubal ligation and cholecystectomy. 4. Hepatic steatosis. 5. Grossly normal GI tract including the appendix. Electronically Signed: Ervin Diaz MD  7/25/2024 5:20 AM EDT  Workstation ID: [id number]    XR Abdomen KUB    Result Date: 7/25/2024  XR ABDOMEN KUB Date of Exam: 7/25/2024 1:00 AM EDT Indication: abdominal pain Comparison: CT abdomen pelvis 5/30/2024 Findings: Cholecystectomy clips are present. Tubal ligation clips are present in the pelvis. There are numerous pelvic phleboliths. Bowel gas pattern is nonobstructive. No clear evidence of renal or ureteral calculus.     Nonobstructive bowel gas pattern. Electronically Signed: Ervin Diaz MD  7/25/2024 1:13 AM EDT  Workstation ID: [id number]       Differential Diagnosis and Discussion:    Abdominal Pain: Based on the patient's signs and symptoms, I considered abdominal aortic aneurysm, small bowel obstruction, pancreatitis, acute cholecystitis, acute appendecitis, peptic ulcer disease, gastritis, colitis, endocrine disorders, irritable bowel syndrome and other differential diagnosis an etiology of the patient's abdominal pain.  Flank Pain: Differential diagnosis includes but is not limited to kidney stones, pyelonephritis,  musculoskeletal disorders, renal infarction, urinary tract infection, hydronephrosis, radiculopathy, aortic aneurysm, renal cell carcinoma.    All labs were reviewed and interpreted by me.  All X-rays impressions were independently interpreted by me.  CT scan radiology impression was interpreted by me.    MDM  Number of Diagnoses or Management Options  Flank pain  Left sided abdominal pain of unknown cause  Diagnosis management comments: The patient is resting comfortably and feels better, is alert and in no distress. Repeat examination is unremarkable and benign; in particular, there's no discomfort at McBurney's point and there is no pulsatile mass. The history, exam, diagnostic testing, and current condition does not suggest acute appendicitis, bowel obstruction, acute cholecystitis, bowel perforation, major gastrointestinal bleeding, severe diverticulitis, abdominal aortic aneurysm, mesenteric ischemia, volvulus, sepsis, or other significant pathology that warrants further testing, continued ED treatment, admission, for surgical evaluation at this point. The vital signs have been stable. The patient does not have uncontrollable pain, intractable vomiting, or other significant symptoms. The patient's condition is stable and appropriate for discharge from the emergency department.       Amount and/or Complexity of Data Reviewed  Clinical lab tests: reviewed and ordered  Tests in the radiology section of CPT®: reviewed and ordered  Tests in the medicine section of CPT®: ordered and reviewed  Decide to obtain previous medical records or to obtain history from someone other than the patient: yes    Risk of Complications, Morbidity, and/or Mortality  Presenting problems: moderate  Diagnostic procedures: moderate  Management options: low    Patient Progress  Patient progress: stable           Patient Care Considerations:    ANTIBIOTICS: I considered prescribing antibiotics as an outpatient however no bacterial focus of  infection was found. NARCOTICS: I considered prescribing opiate pain medication as an outpatient, however no emergent findings      Consultants/Shared Management Plan:    None    Social Determinants of Health:    Patient has presented with family members who are responsible, reliable and will ensure follow up care.      Disposition and Care Coordination:    Discharged: I considered escalation of care by admitting this patient to the hospital, however no emergent findings on CT or labs    I have explained the patient´s condition, diagnoses and treatment plan based on the information available to me at this time. I have answered questions and addressed any concerns. The patient has a good  understanding of the patient´s diagnosis, condition, and treatment plan as can be expected at this point. The vital signs have been stable. The patient´s condition is stable and appropriate for discharge from the emergency department.      The patient will pursue further outpatient evaluation with the primary care physician or other designated or consulting physician as outlined in the discharge instructions. They are agreeable to this plan of care and follow-up instructions have been explained in detail. The patient has received these instructions in written format and has expressed an understanding of the discharge instructions. The patient is aware that any significant change in condition or worsening of symptoms should prompt an immediate return to this or the closest emergency department or call to 911.  I have explained discharge medications and the need for follow up with the patient/caretakers. This was also printed in the discharge instructions. Patient was discharged with the following medications and follow up:      Medication List        New Prescriptions      cyclobenzaprine 10 MG tablet  Commonly known as: FLEXERIL  Take 1 tablet by mouth 3 (Three) Times a Day As Needed for Muscle Spasms.            Changed       dicyclomine 20 MG tablet  Commonly known as: BENTYL  Take 1 tablet by mouth Every 6 (Six) Hours As Needed for Abdominal Cramping.  What changed:   when to take this  reasons to take this     ondansetron ODT 4 MG disintegrating tablet  Commonly known as: ZOFRAN-ODT  Place 1 tablet on the tongue 4 (Four) Times a Day As Needed for Nausea or Vomiting.  What changed: when to take this               Where to Get Your Medications        These medications were sent to [facility] - [place], [place] - [address] - [phone]  - [phone]   [address] [facility] [address]      Phone: [phone]   cyclobenzaprine 10 MG tablet  dicyclomine 20 MG tablet  ondansetron ODT 4 MG disintegrating tablet      Tricia Esparza MD  [address] [place] [address] [id number] [phone]    Schedule an appointment as soon as possible for a visit          Final diagnoses:   Left sided abdominal pain of unknown cause   Flank pain        ED Disposition       ED Disposition   Discharge    Condition   Stable    Comment   --               This medical record created using voice recognition software.             Radha Amato, BOSTON  07/25/24 0601

## 2024-07-25 NOTE — DISCHARGE INSTRUCTIONS
All of your testing including lab work, x-ray, and CT were negative and did not show any emergent findings to explain the pain that you are having.    Take medications as prescribed.    Follow-up with your PCP for further evaluation and you may need possible gastroenterology or surgical referral for possible colonoscopy.

## 2024-07-29 LAB
BACTERIA SPEC AEROBE CULT: NORMAL
BACTERIA SPEC AEROBE CULT: NORMAL

## 2024-09-28 ENCOUNTER — HOSPITAL ENCOUNTER (EMERGENCY)
Facility: HOSPITAL | Age: 39
Discharge: HOME | End: 2024-09-28
Attending: EMERGENCY MEDICINE
Payer: MEDICARE

## 2024-09-28 ENCOUNTER — APPOINTMENT (OUTPATIENT)
Dept: CT IMAGING | Facility: HOSPITAL | Age: 39
End: 2024-09-28
Payer: MEDICARE

## 2024-09-28 VITALS
BODY MASS INDEX: 43.37 KG/M2 | TEMPERATURE: 98.7 F | HEART RATE: 77 BPM | DIASTOLIC BLOOD PRESSURE: 79 MMHG | OXYGEN SATURATION: 98 % | WEIGHT: 235.67 LBS | HEIGHT: 62 IN | SYSTOLIC BLOOD PRESSURE: 113 MMHG | RESPIRATION RATE: 16 BRPM

## 2024-09-28 DIAGNOSIS — K76.0 FATTY LIVER: ICD-10-CM

## 2024-09-28 DIAGNOSIS — R10.13 EPIGASTRIC PAIN: ICD-10-CM

## 2024-09-28 DIAGNOSIS — K21.9 GASTROESOPHAGEAL REFLUX DISEASE WITHOUT ESOPHAGITIS: Primary | ICD-10-CM

## 2024-09-28 DIAGNOSIS — N20.0 RENAL CALCULUS: ICD-10-CM

## 2024-09-28 LAB
ALBUMIN SERPL-MCNC: 4.1 G/DL (ref 3.5–5.2)
ALBUMIN/GLOB SERPL: 1.4 G/DL
ALP SERPL-CCNC: 92 U/L (ref 39–117)
ALT SERPL W P-5'-P-CCNC: 68 U/L (ref 1–33)
ANION GAP SERPL CALCULATED.3IONS-SCNC: 9.2 MMOL/L (ref 5–15)
AST SERPL-CCNC: 51 U/L (ref 1–32)
BACTERIA UR QL AUTO: ABNORMAL /HPF
BASOPHILS # BLD AUTO: 0.08 10*3/MM3 (ref 0–0.2)
BASOPHILS NFR BLD AUTO: 1.1 % (ref 0–1.5)
BILIRUB SERPL-MCNC: 0.5 MG/DL (ref 0–1.2)
BILIRUB UR QL STRIP: NEGATIVE
BUN SERPL-MCNC: 7 MG/DL (ref 6–20)
BUN/CREAT SERPL: 9.3 (ref 7–25)
CALCIUM SPEC-SCNC: 9.6 MG/DL (ref 8.6–10.5)
CHLORIDE SERPL-SCNC: 102 MMOL/L (ref 98–107)
CLARITY UR: ABNORMAL
CO2 SERPL-SCNC: 24.8 MMOL/L (ref 22–29)
COLOR UR: YELLOW
CREAT SERPL-MCNC: 0.75 MG/DL (ref 0.57–1)
DEPRECATED RDW RBC AUTO: 44 FL (ref 37–54)
EGFRCR SERPLBLD CKD-EPI 2021: 104 ML/MIN/1.73
EOSINOPHIL # BLD AUTO: 0.7 10*3/MM3 (ref 0–0.4)
EOSINOPHIL NFR BLD AUTO: 9.2 % (ref 0.3–6.2)
ERYTHROCYTE [DISTWIDTH] IN BLOOD BY AUTOMATED COUNT: 13.6 % (ref 12.3–15.4)
GLOBULIN UR ELPH-MCNC: 2.9 GM/DL
GLUCOSE SERPL-MCNC: 96 MG/DL (ref 65–99)
GLUCOSE UR STRIP-MCNC: NEGATIVE MG/DL
HCG INTACT+B SERPL-ACNC: <0.5 MIU/ML
HCG SERPL QL: NEGATIVE
HCT VFR BLD AUTO: 48.6 % (ref 34–46.6)
HGB BLD-MCNC: 16.5 G/DL (ref 12–15.9)
HGB UR QL STRIP.AUTO: NEGATIVE
HOLD SPECIMEN: NORMAL
HYALINE CASTS UR QL AUTO: ABNORMAL /LPF
IMM GRANULOCYTES # BLD AUTO: 0.02 10*3/MM3 (ref 0–0.05)
IMM GRANULOCYTES NFR BLD AUTO: 0.3 % (ref 0–0.5)
KETONES UR QL STRIP: NEGATIVE
LEUKOCYTE ESTERASE UR QL STRIP.AUTO: ABNORMAL
LIPASE SERPL-CCNC: 29 U/L (ref 13–60)
LYMPHOCYTES # BLD AUTO: 3.5 10*3/MM3 (ref 0.7–3.1)
LYMPHOCYTES NFR BLD AUTO: 46 % (ref 19.6–45.3)
MCH RBC QN AUTO: 29.9 PG (ref 26.6–33)
MCHC RBC AUTO-ENTMCNC: 34 G/DL (ref 31.5–35.7)
MCV RBC AUTO: 88.2 FL (ref 79–97)
MONOCYTES # BLD AUTO: 0.55 10*3/MM3 (ref 0.1–0.9)
MONOCYTES NFR BLD AUTO: 7.2 % (ref 5–12)
NEUTROPHILS NFR BLD AUTO: 2.76 10*3/MM3 (ref 1.7–7)
NEUTROPHILS NFR BLD AUTO: 36.2 % (ref 42.7–76)
NITRITE UR QL STRIP: NEGATIVE
NRBC BLD AUTO-RTO: 0 /100 WBC (ref 0–0.2)
PH UR STRIP.AUTO: 7.5 [PH] (ref 5–8)
PLATELET # BLD AUTO: 232 10*3/MM3 (ref 140–450)
PMV BLD AUTO: 9.9 FL (ref 6–12)
POTASSIUM SERPL-SCNC: 4 MMOL/L (ref 3.5–5.2)
PROT SERPL-MCNC: 7 G/DL (ref 6–8.5)
PROT UR QL STRIP: NEGATIVE
RBC # BLD AUTO: 5.51 10*6/MM3 (ref 3.77–5.28)
RBC # UR STRIP: ABNORMAL /HPF
REF LAB TEST METHOD: ABNORMAL
SODIUM SERPL-SCNC: 136 MMOL/L (ref 136–145)
SP GR UR STRIP: 1.01 (ref 1–1.03)
SQUAMOUS #/AREA URNS HPF: ABNORMAL /HPF
UROBILINOGEN UR QL STRIP: ABNORMAL
WBC # UR STRIP: ABNORMAL /HPF
WBC NRBC COR # BLD AUTO: 7.61 10*3/MM3 (ref 3.4–10.8)
WHOLE BLOOD HOLD COAG: NORMAL
WHOLE BLOOD HOLD SPECIMEN: NORMAL

## 2024-09-28 PROCEDURE — 84702 CHORIONIC GONADOTROPIN TEST: CPT

## 2024-09-28 PROCEDURE — 25010000002 ONDANSETRON PER 1 MG: Performed by: EMERGENCY MEDICINE

## 2024-09-28 PROCEDURE — 25010000002 MORPHINE PER 10 MG: Performed by: EMERGENCY MEDICINE

## 2024-09-28 PROCEDURE — 80053 COMPREHEN METABOLIC PANEL: CPT

## 2024-09-28 PROCEDURE — 99285 EMERGENCY DEPT VISIT HI MDM: CPT

## 2024-09-28 PROCEDURE — 25510000001 IOPAMIDOL PER 1 ML: Performed by: EMERGENCY MEDICINE

## 2024-09-28 PROCEDURE — 85025 COMPLETE CBC W/AUTO DIFF WBC: CPT

## 2024-09-28 PROCEDURE — 81001 URINALYSIS AUTO W/SCOPE: CPT

## 2024-09-28 PROCEDURE — 83690 ASSAY OF LIPASE: CPT

## 2024-09-28 PROCEDURE — 96374 THER/PROPH/DIAG INJ IV PUSH: CPT

## 2024-09-28 PROCEDURE — 96375 TX/PRO/DX INJ NEW DRUG ADDON: CPT

## 2024-09-28 PROCEDURE — 84703 CHORIONIC GONADOTROPIN ASSAY: CPT

## 2024-09-28 PROCEDURE — 74177 CT ABD & PELVIS W/CONTRAST: CPT

## 2024-09-28 RX ORDER — IOPAMIDOL 755 MG/ML
100 INJECTION, SOLUTION INTRAVASCULAR
Status: COMPLETED | OUTPATIENT
Start: 2024-09-28 | End: 2024-09-28

## 2024-09-28 RX ORDER — PANTOPRAZOLE SODIUM 20 MG/1
20 TABLET, DELAYED RELEASE ORAL DAILY
Qty: 31 TABLET | Refills: 0 | Status: SHIPPED | OUTPATIENT
Start: 2024-09-28

## 2024-09-28 RX ORDER — SODIUM CHLORIDE 0.9 % (FLUSH) 0.9 %
10 SYRINGE (ML) INJECTION AS NEEDED
Status: DISCONTINUED | OUTPATIENT
Start: 2024-09-28 | End: 2024-09-28 | Stop reason: HOSPADM

## 2024-09-28 RX ORDER — PANTOPRAZOLE SODIUM 40 MG/10ML
40 INJECTION, POWDER, LYOPHILIZED, FOR SOLUTION INTRAVENOUS ONCE
Status: COMPLETED | OUTPATIENT
Start: 2024-09-28 | End: 2024-09-28

## 2024-09-28 RX ORDER — ONDANSETRON 2 MG/ML
4 INJECTION INTRAMUSCULAR; INTRAVENOUS ONCE
Status: COMPLETED | OUTPATIENT
Start: 2024-09-28 | End: 2024-09-28

## 2024-09-28 RX ADMIN — IOPAMIDOL 100 ML: 755 INJECTION, SOLUTION INTRAVENOUS at 17:12

## 2024-09-28 RX ADMIN — ONDANSETRON 4 MG: 2 INJECTION INTRAMUSCULAR; INTRAVENOUS at 16:51

## 2024-09-28 RX ADMIN — PANTOPRAZOLE SODIUM 40 MG: 40 INJECTION, POWDER, FOR SOLUTION INTRAVENOUS at 17:43

## 2024-09-28 RX ADMIN — MORPHINE SULFATE 4 MG: 4 INJECTION, SOLUTION INTRAMUSCULAR; INTRAVENOUS at 16:51

## 2024-09-28 NOTE — DISCHARGE INSTRUCTIONS
Follow-up with your primary care provider within 72 hours.  Return to ER if symptoms worsen or fail to improve.    You are being sent home with medication to help with the stomach, Protonix.  Take daily for the next 30 days.

## 2024-09-28 NOTE — ED PROVIDER NOTES
Time: 4:15 PM EDT  Date of encounter:  2024  Independent Historian/Clinical History and Information was obtained by:   Patient    History is limited by: N/A    Chief Complaint: Abdominal pain      History of Present Illness:  Patient is a 38 y.o. year old female who presents to the emergency department for evaluation of abdominal pain.  Patient states that has been going on for the past 2 days and feels like cramping in the upper right and left quadrants.  Patient has had several episodes of nausea and is unable to keep any food or drinks down.  Patient has a history of pancreatitis and her last flareup was about 4 to 5 months ago.      Patient Care Team  Primary Care Provider: Tricia Esparza MD    Past Medical History:     Allergies   Allergen Reactions    Sumatriptan Shortness Of Breath    Amoxicillin Hives     Hives and blisters    Tylenol [Acetaminophen] Hives     Past Medical History:   Diagnosis Date    Abnormal ECG     Acid reflux     Anemia     Anxiety 2014    Asthma     Bleeding disorder     Cholelithiasis     Clotting disorder     Deep vein thrombosis 2018    Depression     Foot sprain, right, initial encounter 10/21/2015    GERD (gastroesophageal reflux disease)     HTN (hypertension)     gestational    Migraine headache     Pancreatitis 2022    [facility]    Pineal gland cyst 2014    Renal cyst 2014     Past Surgical History:   Procedure Laterality Date     SECTION      CHOLECYSTECTOMY      COLONOSCOPY N/A 2023    Procedure: COLONOSCOPY WITH BIOPSIES;  Surgeon: Feliz Olguin MD;  Location: [facility] ENDOSCOPY;  Service: Gastroenterology;  Laterality: N/A;  NORMAL COLONOSCOPY    ENDOSCOPY N/A 10/22/2021    Procedure: ESOPHAGOGASTRODUODENOSCOPY WITH BIOPSIES;  Surgeon: Feliz Olguin MD;  Location: [facility] ENDOSCOPY;  Service: Gastroenterology;  Laterality: N/A;  NORMAL EGD    ENDOSCOPY N/A 2023    Procedure: ESOPHAGOGASTRODUODENOSCOPY WITH  BIOPSIES;  Surgeon: Feliz Olgiun MD;  Location: [facility] ENDOSCOPY;  Service: Gastroenterology;  Laterality: N/A;  HIATAL HERNIA    ENDOSCOPY N/A 3/22/2024    Procedure: ESOPHAGOGASTRODUODENOSCOPY WITH BIOPSIES;  Surgeon: Feliz Olguin MD;  Location: [facility] ENDOSCOPY;  Service: Gastroenterology;  Laterality: N/A;  RETAINED FOOD IN THE STOMACH, EROSIVE GASTRITIS, REFLUX ESOPHAGITIS    LASER ABLATION      TUBAL ABDOMINAL LIGATION  03/27/2012     Family History   Problem Relation Age of Onset    Diabetes Mother     Arthritis Mother     Anxiety disorder Mother     Hyperlipidemia Mother     Liver disease Mother     Stroke Mother     Thyroid disease Mother     Other Father         cardio vascular disease    Diabetes Father     Kidney failure Father     Arthritis Father     Colon cancer Neg Hx        Home Medications:  Prior to Admission medications    Medication Sig Start Date End Date Taking? Authorizing Provider   amitriptyline (ELAVIL) 25 MG tablet Take 1 tablet by mouth Every Night. 9/15/23   Khari Mar MD   cyclobenzaprine (FLEXERIL) 10 MG tablet Take 1 tablet by mouth 3 (Three) Times a Day As Needed for Muscle Spasms. 7/25/24   Radha Amato APRN   dicyclomine (BENTYL) 20 MG tablet Take 1 tablet by mouth Every 6 (Six) Hours As Needed for Abdominal Cramping. 7/25/24   Radha Amato APRN   fenofibrate (TRICOR) 145 MG tablet Take 1 tablet by mouth. 11/28/23   Khari Mar MD   levETIRAcetam (KEPPRA) 500 MG tablet Take 1 tablet by mouth 2 (Two) Times a Day.    Khari Mar MD   montelukast (SINGULAIR) 10 MG tablet Take 1 tablet by mouth Every Night.    Khari Mar MD   ondansetron ODT (ZOFRAN-ODT) 4 MG disintegrating tablet Place 1 tablet on the tongue 4 (Four) Times a Day As Needed for Nausea or Vomiting. 7/25/24   Radha Amato APRN   Pancrelipase, Lip-Prot-Amyl, (Creon) 57779-996645 units capsule delayed-release particles capsule Take 1 capsule by mouth Take As  "Directed. Take 2 capsule with every meal and 1 capsule with snacks 10/20/23   Hai ThaoBOSTON De Leon   propranolol (INDERAL) 60 MG tablet Take 1 tablet by mouth 2 (Two) Times a Day. 9/15/23   Khari Mar MD   rivaroxaban (XARELTO) 20 MG tablet Take 1 tablet by mouth Daily.    Khari Mar MD   sertraline (ZOLOFT) 50 MG tablet Take 1 tablet by mouth Daily. 1/3/23   Khari Mar MD   vitamin D (ERGOCALCIFEROL) 1.25 MG (65000 UT) capsule capsule Take 1 capsule by mouth 1 (One) Time Per Week. Monday    Khari Mar MD        Social History:   Social History     Tobacco Use    Smoking status: Former     Current packs/day: 0.00     Average packs/day: 1 pack/day for 15.0 years (15.0 ttl pk-yrs)     Types: Cigarettes     Start date: 2007     Quit date: 2022     Years since quittin.9     Passive exposure: Past    Smokeless tobacco: Never   Vaping Use    Vaping status: Never Used   Substance Use Topics    Alcohol use: Never    Drug use: Never         Review of Systems:  Review of Systems   Constitutional:  Positive for appetite change and chills.   Respiratory:  Negative for shortness of breath.    Cardiovascular:  Negative for chest pain.   Gastrointestinal:  Positive for abdominal pain, nausea and vomiting.   Genitourinary:  Negative for dysuria, frequency and urgency.   Skin:  Negative for rash.   Neurological:  Positive for headaches. Negative for dizziness.   Psychiatric/Behavioral:  Negative for confusion.         Physical Exam:  /79   Pulse 77   Temp 98.7 °F (37.1 °C) (Oral)   Resp 16   Ht 157.5 cm (62\")   Wt 107 kg (235 lb 10.8 oz)   SpO2 98%   BMI 43.10 kg/m²     Physical Exam  Vitals reviewed.   Constitutional:       Appearance: She is obese.   Cardiovascular:      Rate and Rhythm: Regular rhythm. Tachycardia present.      Heart sounds: Normal heart sounds.   Pulmonary:      Effort: Pulmonary effort is normal.      Breath sounds: Normal breath sounds. "   Abdominal:      General: Abdomen is flat. Bowel sounds are increased.      Palpations: Abdomen is soft.      Tenderness: There is abdominal tenderness in the right upper quadrant, epigastric area and left upper quadrant. There is no right CVA tenderness, left CVA tenderness, guarding or rebound.   Skin:     General: Skin is warm and dry.      Findings: No rash.   Neurological:      Mental Status: She is alert.                Procedures:  Procedures      Medical Decision Making:      Comorbidities that affect care:    Asthma, Hypertension    External Notes reviewed:    None      The following orders were placed and all results were independently analyzed by me:  Orders Placed This Encounter   Procedures    CT Abdomen Pelvis With Contrast    [place] Draw    Comprehensive Metabolic Panel    Lipase    Urinalysis With Microscopic If Indicated (No Culture) - Urine, Clean Catch    hCG, Quantitative, Pregnancy    hCG, Serum, Qualitative    CBC Auto Differential    Urinalysis, Microscopic Only - Urine, Clean Catch    CBC & Differential    Green Top (Gel)    Lavender Top    Light Blue Top       Medications Given in the Emergency Department:  Medications   morphine injection 4 mg (4 mg Intravenous Given 9/28/24 1651)   ondansetron (ZOFRAN) injection 4 mg (4 mg Intravenous Given 9/28/24 1651)   iopamidol (ISOVUE-370) 76 % injection 100 mL (100 mL Intravenous Given 9/28/24 1712)   pantoprazole (PROTONIX) injection 40 mg (40 mg Intravenous Given 9/28/24 1743)        ED Course:    ED Course as of 09/29/24 0024   Sat Sep 28, 2024   1736 Comprehensive Metabolic Panel(!)  The patient´s CMP that was reviewed and interpretted by me shows no abnormalities of critical concern. Of note, the patient´s sodium and potassium are acceptable. The patient´s liver enzymes are unremarkable. The patient´s renal function (creatinine) is preserved. The patient has a normal anion gap.    Liver enzymes slightly elevated however decreased from  previous check. [AS]   1736 CBC & Differential(!)  The patient´s CBC that was reviewed and interpreted by me shows no abnormalities of critical concern. Of note, there is no anemia requiring a blood transfusion and the platelet count is acceptable. [AS]      ED Course User Index  [AS] Seaver, Alyce B, [place]       Labs:    Lab Results (last 24 hours)       Procedure Component Value Units Date/Time    CBC & Differential [[id number]]  (Abnormal) Collected: 09/28/24 1627    Specimen: Blood from Arm, Right Updated: 09/28/24 1636    Narrative:      The following orders were created for panel order CBC & Differential.  Procedure                               Abnormality         Status                     ---------                               -----------         ------                     CBC Auto Differential[[id number]]        Abnormal            Final result                 Please view results for these tests on the individual orders.    Comprehensive Metabolic Panel [[id number]]  (Abnormal) Collected: 09/28/24 1627    Specimen: Blood from Arm, Right Updated: 09/28/24 1654     Glucose 96 mg/dL      BUN 7 mg/dL      Creatinine 0.75 mg/dL      Sodium 136 mmol/L      Potassium 4.0 mmol/L      Comment: Slight hemolysis detected by analyzer. Result may be falsely elevated.        Chloride 102 mmol/L      CO2 24.8 mmol/L      Calcium 9.6 mg/dL      Total Protein 7.0 g/dL      Albumin 4.1 g/dL      ALT (SGPT) 68 U/L      AST (SGOT) 51 U/L      Alkaline Phosphatase 92 U/L      Total Bilirubin 0.5 mg/dL      Globulin 2.9 gm/dL      A/G Ratio 1.4 g/dL      BUN/Creatinine Ratio 9.3     Anion Gap 9.2 mmol/L      eGFR 104.0 mL/min/1.73     Narrative:      GFR Normal >60  Chronic Kidney Disease <60  Kidney Failure <15      Lipase [[id number]]  (Normal) Collected: 09/28/24 1627    Specimen: Blood from Arm, Right Updated: 09/28/24 1654     Lipase 29 U/L     hCG, Quantitative, Pregnancy [[id number]] Collected: 09/28/24 1627    Specimen:  Blood from Arm, Right Updated: 09/28/24 1653     HCG Quantitative <0.50 mIU/mL     Narrative:      HCG Ranges by Gestational Age    Females - non-pregnant premenopausal   </= 1mIU/mL HCG  Females - postmenopausal               </= 7mIU/mL HCG    3 Weeks       5.4   -      72 mIU/mL  4 Weeks      10.2   -     708 mIU/mL  5 Weeks       217   -   8,245 mIU/mL  6 Weeks       152   -  32,177 mIU/mL  7 Weeks     4,059   - 153,767 mIU/mL  8 Weeks    31,366   - 149,094 mIU/mL  9 Weeks    59,109   - 135,901 mIU/mL  10 Weeks   44,186   - 170,409 mIU/mL  12 Weeks   27,107   - 201,615 mIU/mL  14 Weeks   24,302   -  93,646 mIU/mL  15 Weeks   12,540   -  69,747 mIU/mL  16 Weeks    8,904   -  55,332 mIU/mL  17 Weeks    8,240   -  51,793 mIU/mL  18 Weeks    9,649   -  55,271 mIU/mL      hCG, Serum, Qualitative [[id number]]  (Normal) Collected: 09/28/24 1627    Specimen: Blood from Arm, Right Updated: 09/28/24 1644     HCG Qualitative Negative    Narrative:      Sensitive immunoassays may demonstrate false positive results  with specimens containing heterophilic antibodies. Assays may  also exhibit false-positive or false-negative results with  specimens containing human anti-mouse antibodies. These   specimens may come from patients receiving preparations of  mouse monoclonal antibodies for diagnosis or therapy or having  been exposed to mice. If the qualitative interpretation is  inconsistent with the clinical evaluation, results should be   confirmed by an alternate hCG method, ie. quantitative hCG.    CBC Auto Differential [[id number]]  (Abnormal) Collected: 09/28/24 1627    Specimen: Blood from Arm, Right Updated: 09/28/24 1636     WBC 7.61 10*3/mm3      RBC 5.51 10*6/mm3      Hemoglobin 16.5 g/dL      Hematocrit 48.6 %      MCV 88.2 fL      MCH 29.9 pg      MCHC 34.0 g/dL      RDW 13.6 %      RDW-SD 44.0 fl      MPV 9.9 fL      Platelets 232 10*3/mm3      Neutrophil % 36.2 %      Lymphocyte % 46.0 %      Monocyte % 7.2 %       Eosinophil % 9.2 %      Basophil % 1.1 %      Immature Grans % 0.3 %      Neutrophils, Absolute 2.76 10*3/mm3      Lymphocytes, Absolute 3.50 10*3/mm3      Monocytes, Absolute 0.55 10*3/mm3      Eosinophils, Absolute 0.70 10*3/mm3      Basophils, Absolute 0.08 10*3/mm3      Immature Grans, Absolute 0.02 10*3/mm3      nRBC 0.0 /100 WBC     Urinalysis With Microscopic If Indicated (No Culture) - Urine, Clean Catch [[id number]]  (Abnormal) Collected: 09/28/24 1714    Specimen: Urine, Clean Catch Updated: 09/28/24 1731     Color, UA Yellow     Appearance, UA Cloudy     pH, UA 7.5     Specific Gravity, UA 1.006     Glucose, UA Negative     Ketones, UA Negative     Bilirubin, UA Negative     Blood, UA Negative     Protein, UA Negative     Leuk Esterase, UA Trace     Nitrite, UA Negative     Urobilinogen, UA 0.2 E.U./dL    Urinalysis, Microscopic Only - Urine, Clean Catch [[id number]]  (Abnormal) Collected: 09/28/24 1714    Specimen: Urine, Clean Catch Updated: 09/28/24 1732     RBC, UA 0-2 /HPF      WBC, UA 3-5 /HPF      Bacteria, UA 2+ /HPF      Squamous Epithelial Cells, UA 13-20 /HPF      Hyaline Casts, UA 0-2 /LPF      Methodology Automated Microscopy             Imaging:    CT Abdomen Pelvis With Contrast    Result Date: 9/28/2024  CT ABDOMEN PELVIS W CONTRAST Date of Exam: 9/28/2024 5:01 PM EDT Indication: RUQ and LUQ pain. Comparison: July 25, 2024, May 30, 2024, and prior Technique: Axial CT images were obtained of the abdomen and pelvis after the uneventful intravenous administration of iodinated contrast. Reconstructed coronal and sagittal images were also obtained. Automated exposure control and iterative construction methods were used. Findings: Probable scarring in the lung bases, as before. No definite pleural or pericardial effusion. Diffuse fatty infiltration of the liver. Status post cholecystectomy. No definite bili ductal dilatation. No splenomegaly or suspicious splenic lesion. No suspicious adrenal  abnormality. Similar appearance of the pancreas with fatty infiltration. No definite ductal dilatation or significant new peripancreatic edema or collection. Aorta appears normal in caliber. Mesenteric vessels appear to enhance as before. Narrowing of the proximal celiac artery, as before, which could be associated with median arcuate ligament syndrome. Small left upper pole renal calculus. No hydronephrosis. No definite ureteral calculus or suspicious renal abnormality. No definite new lymphadenopathy. No acute gastric abnormality. No small bowel dilatation. Appendix appears within normal limits. No definite acute colonic or rectal abnormality. No pelvic free fluid. Status post tubal ligation. Uterus and adnexa appear grossly unremarkable. Bladder is nondistended, limiting evaluation. No definite new pelvic lymphadenopathy. No definite findings of acute osseous abnormality.     Impression: 1.Similar appearance of the pancreas without definite CT evidence of acute pancreatitis. Correlate with lab values given history of prior pancreatitis. 2.No definite CT findings of acute abdominal or pelvic abnormality. 3.Fatty infiltration of the liver. 4.Small nonobstructing left renal calculus. 5.Narrowing of the proximal celiac artery, as before, which could be associated with median arcuate ligament syndrome. Electronically Signed: Ernst Goddard  9/28/2024 5:27 PM EDT  Workstation ID: [id number]       Differential Diagnosis and Discussion:    Abdominal Pain: Based on the patient's signs and symptoms, I considered abdominal aortic aneurysm, small bowel obstruction, pancreatitis, acute cholecystitis, acute appendecitis, peptic ulcer disease, gastritis, colitis, endocrine disorders, irritable bowel syndrome and other differential diagnosis an etiology of the patient's abdominal pain.    All labs were reviewed and interpreted by me.  CT scan radiology impression was interpreted by me.    MDM  Number of Diagnoses or Management  Options  Epigastric pain  Fatty liver  Gastroesophageal reflux disease without esophagitis  Renal calculus  Diagnosis management comments: The patient is resting comfortably and feels better, is alert and in no distress. Repeat examination is unremarkable and benign; in particular, there's no discomfort at McBurney's point and there is no pulsatile mass. The history, exam, diagnostic testing, and current condition does not suggest acute appendicitis, bowel obstruction, acute cholecystitis, bowel perforation, major gastrointestinal bleeding, severe diverticulitis, abdominal aortic aneurysm, mesenteric ischemia, volvulus, sepsis, or other significant pathology that warrants further testing, continued ED treatment, admission, for surgical evaluation at this point. The vital signs have been stable. The patient does not have uncontrollable pain, intractable vomiting, or other significant symptoms. The patient's condition is stable and appropriate for discharge from the emergency department.       Amount and/or Complexity of Data Reviewed  Clinical lab tests: reviewed  Tests in the radiology section of CPT®: reviewed  Decide to obtain previous medical records or to obtain history from someone other than the patient: yes           Patient Care Considerations:    ANTIBIOTICS: I considered prescribing antibiotics as an outpatient however no bacterial focus of infection was found.      Consultants/Shared Management Plan:    None    Social Determinants of Health:    Patient is independent, reliable, and has access to care.       Disposition and Care Coordination:    Discharged: The patient is suitable and stable for discharge with no need for consideration of admission.    I have explained the patient´s condition, diagnoses and treatment plan based on the information available to me at this time. I have answered questions and addressed any concerns. The patient has a good  understanding of the patient´s diagnosis, condition,  and treatment plan as can be expected at this point. The vital signs have been stable. The patient´s condition is stable and appropriate for discharge from the emergency department.      The patient will pursue further outpatient evaluation with the primary care physician or other designated or consulting physician as outlined in the discharge instructions. They are agreeable to this plan of care and follow-up instructions have been explained in detail. The patient has received these instructions in written format and has expressed an understanding of the discharge instructions. The patient is aware that any significant change in condition or worsening of symptoms should prompt an immediate return to this or the closest emergency department or call to 1.  I have explained discharge medications and the need for follow up with the patient/caretakers. This was also printed in the discharge instructions. Patient was discharged with the following medications and follow up:      Medication List        New Prescriptions      pantoprazole 20 MG EC tablet  Commonly known as: PROTONIX  Take 1 tablet by mouth Daily.               Where to Get Your Medications        These medications were sent to [facility] - [place], [place] - [address] - [phone]  - [phone]   [address] [facility] [address]      Phone: [phone]   pantoprazole 20 MG EC tablet      Tricia Esparza MD  [address] [place] [id number] [phone]             Final diagnoses:   Gastroesophageal reflux disease without esophagitis   Epigastric pain   Renal calculus   Fatty liver        ED Disposition       ED Disposition   Discharge    Condition   Stable    Comment   --               This medical record created using voice recognition software.             Seaver, Alyce B, APRN  09/29/24 0025

## 2024-11-11 ENCOUNTER — APPOINTMENT (OUTPATIENT)
Dept: CT IMAGING | Facility: HOSPITAL | Age: 39
End: 2024-11-11
Payer: MEDICARE

## 2024-11-11 ENCOUNTER — HOSPITAL ENCOUNTER (EMERGENCY)
Facility: HOSPITAL | Age: 39
Discharge: HOME | End: 2024-11-11
Attending: EMERGENCY MEDICINE | Admitting: EMERGENCY MEDICINE
Payer: MEDICARE

## 2024-11-11 VITALS
HEART RATE: 84 BPM | OXYGEN SATURATION: 95 % | SYSTOLIC BLOOD PRESSURE: 116 MMHG | DIASTOLIC BLOOD PRESSURE: 77 MMHG | WEIGHT: 223.11 LBS | BODY MASS INDEX: 41.06 KG/M2 | RESPIRATION RATE: 18 BRPM | HEIGHT: 62 IN | TEMPERATURE: 98.4 F

## 2024-11-11 DIAGNOSIS — K86.1 IDIOPATHIC CHRONIC PANCREATITIS: Primary | ICD-10-CM

## 2024-11-11 LAB
ALBUMIN SERPL-MCNC: 4.3 G/DL (ref 3.5–5.2)
ALBUMIN/GLOB SERPL: 1.5 G/DL
ALP SERPL-CCNC: 89 U/L (ref 39–117)
ALT SERPL W P-5'-P-CCNC: 72 U/L (ref 1–33)
ANION GAP SERPL CALCULATED.3IONS-SCNC: 10.9 MMOL/L (ref 5–15)
AST SERPL-CCNC: 55 U/L (ref 1–32)
BASOPHILS # BLD AUTO: 0.07 10*3/MM3 (ref 0–0.2)
BASOPHILS NFR BLD AUTO: 0.8 % (ref 0–1.5)
BILIRUB SERPL-MCNC: 0.5 MG/DL (ref 0–1.2)
BILIRUB UR QL STRIP: NEGATIVE
BUN SERPL-MCNC: 10 MG/DL (ref 6–20)
BUN/CREAT SERPL: 11.4 (ref 7–25)
CALCIUM SPEC-SCNC: 8.9 MG/DL (ref 8.6–10.5)
CHLORIDE SERPL-SCNC: 104 MMOL/L (ref 98–107)
CLARITY UR: CLEAR
CO2 SERPL-SCNC: 22.1 MMOL/L (ref 22–29)
COLOR UR: YELLOW
CREAT SERPL-MCNC: 0.88 MG/DL (ref 0.57–1)
DEPRECATED RDW RBC AUTO: 46.5 FL (ref 37–54)
EGFRCR SERPLBLD CKD-EPI 2021: 85.9 ML/MIN/1.73
EOSINOPHIL # BLD AUTO: 0.65 10*3/MM3 (ref 0–0.4)
EOSINOPHIL NFR BLD AUTO: 7.2 % (ref 0.3–6.2)
ERYTHROCYTE [DISTWIDTH] IN BLOOD BY AUTOMATED COUNT: 13.8 % (ref 12.3–15.4)
GLOBULIN UR ELPH-MCNC: 2.9 GM/DL
GLUCOSE SERPL-MCNC: 120 MG/DL (ref 65–99)
GLUCOSE UR STRIP-MCNC: NEGATIVE MG/DL
HCG INTACT+B SERPL-ACNC: <0.5 MIU/ML
HCT VFR BLD AUTO: 50.1 % (ref 34–46.6)
HGB BLD-MCNC: 16.8 G/DL (ref 12–15.9)
HGB UR QL STRIP.AUTO: NEGATIVE
HOLD SPECIMEN: NORMAL
HOLD SPECIMEN: NORMAL
IMM GRANULOCYTES # BLD AUTO: 0.02 10*3/MM3 (ref 0–0.05)
IMM GRANULOCYTES NFR BLD AUTO: 0.2 % (ref 0–0.5)
KETONES UR QL STRIP: NEGATIVE
LEUKOCYTE ESTERASE UR QL STRIP.AUTO: NEGATIVE
LIPASE SERPL-CCNC: 32 U/L (ref 13–60)
LYMPHOCYTES # BLD AUTO: 4.8 10*3/MM3 (ref 0.7–3.1)
LYMPHOCYTES NFR BLD AUTO: 53.1 % (ref 19.6–45.3)
MCH RBC QN AUTO: 30.5 PG (ref 26.6–33)
MCHC RBC AUTO-ENTMCNC: 33.5 G/DL (ref 31.5–35.7)
MCV RBC AUTO: 91.1 FL (ref 79–97)
MONOCYTES # BLD AUTO: 0.69 10*3/MM3 (ref 0.1–0.9)
MONOCYTES NFR BLD AUTO: 7.6 % (ref 5–12)
NEUTROPHILS NFR BLD AUTO: 2.81 10*3/MM3 (ref 1.7–7)
NEUTROPHILS NFR BLD AUTO: 31.1 % (ref 42.7–76)
NITRITE UR QL STRIP: NEGATIVE
NRBC BLD AUTO-RTO: 0 /100 WBC (ref 0–0.2)
PH UR STRIP.AUTO: 8.5 [PH] (ref 5–8)
PLATELET # BLD AUTO: 220 10*3/MM3 (ref 140–450)
PMV BLD AUTO: 9.9 FL (ref 6–12)
POTASSIUM SERPL-SCNC: 4.1 MMOL/L (ref 3.5–5.2)
PROT SERPL-MCNC: 7.2 G/DL (ref 6–8.5)
PROT UR QL STRIP: NEGATIVE
RBC # BLD AUTO: 5.5 10*6/MM3 (ref 3.77–5.28)
SODIUM SERPL-SCNC: 137 MMOL/L (ref 136–145)
SP GR UR STRIP: 1.01 (ref 1–1.03)
UROBILINOGEN UR QL STRIP: ABNORMAL
WBC NRBC COR # BLD AUTO: 9.04 10*3/MM3 (ref 3.4–10.8)
WHOLE BLOOD HOLD COAG: NORMAL
WHOLE BLOOD HOLD SPECIMEN: NORMAL

## 2024-11-11 PROCEDURE — 36415 COLL VENOUS BLD VENIPUNCTURE: CPT

## 2024-11-11 PROCEDURE — 80053 COMPREHEN METABOLIC PANEL: CPT

## 2024-11-11 PROCEDURE — 99285 EMERGENCY DEPT VISIT HI MDM: CPT

## 2024-11-11 PROCEDURE — 74177 CT ABD & PELVIS W/CONTRAST: CPT

## 2024-11-11 PROCEDURE — 81003 URINALYSIS AUTO W/O SCOPE: CPT

## 2024-11-11 PROCEDURE — 25510000001 IOPAMIDOL PER 1 ML: Performed by: EMERGENCY MEDICINE

## 2024-11-11 PROCEDURE — 84702 CHORIONIC GONADOTROPIN TEST: CPT

## 2024-11-11 PROCEDURE — 25810000003 SODIUM CHLORIDE 0.9 % SOLUTION: Performed by: EMERGENCY MEDICINE

## 2024-11-11 PROCEDURE — 96374 THER/PROPH/DIAG INJ IV PUSH: CPT

## 2024-11-11 PROCEDURE — 85025 COMPLETE CBC W/AUTO DIFF WBC: CPT

## 2024-11-11 PROCEDURE — 25010000002 KETOROLAC TROMETHAMINE PER 15 MG: Performed by: EMERGENCY MEDICINE

## 2024-11-11 PROCEDURE — 83690 ASSAY OF LIPASE: CPT

## 2024-11-11 RX ORDER — KETOROLAC TROMETHAMINE 30 MG/ML
30 INJECTION, SOLUTION INTRAMUSCULAR; INTRAVENOUS ONCE
Status: COMPLETED | OUTPATIENT
Start: 2024-11-11 | End: 2024-11-11

## 2024-11-11 RX ORDER — SODIUM CHLORIDE 0.9 % (FLUSH) 0.9 %
10 SYRINGE (ML) INJECTION AS NEEDED
Status: DISCONTINUED | OUTPATIENT
Start: 2024-11-11 | End: 2024-11-11 | Stop reason: HOSPADM

## 2024-11-11 RX ORDER — IOPAMIDOL 755 MG/ML
100 INJECTION, SOLUTION INTRAVASCULAR
Status: COMPLETED | OUTPATIENT
Start: 2024-11-11 | End: 2024-11-11

## 2024-11-11 RX ORDER — ONDANSETRON 4 MG/1
4 TABLET, ORALLY DISINTEGRATING ORAL EVERY 6 HOURS PRN
Qty: 15 TABLET | Refills: 0 | Status: SHIPPED | OUTPATIENT
Start: 2024-11-11

## 2024-11-11 RX ORDER — OXYCODONE AND ACETAMINOPHEN 5; 325 MG/1; MG/1
1 TABLET ORAL ONCE
Status: COMPLETED | OUTPATIENT
Start: 2024-11-11 | End: 2024-11-11

## 2024-11-11 RX ADMIN — OXYCODONE HYDROCHLORIDE AND ACETAMINOPHEN 1 TABLET: 5; 325 TABLET ORAL at 19:31

## 2024-11-11 RX ADMIN — IOPAMIDOL 100 ML: 755 INJECTION, SOLUTION INTRAVENOUS at 17:08

## 2024-11-11 RX ADMIN — SODIUM CHLORIDE 1000 ML: 9 INJECTION, SOLUTION INTRAVENOUS at 17:41

## 2024-11-11 RX ADMIN — KETOROLAC TROMETHAMINE 30 MG: 30 INJECTION, SOLUTION INTRAMUSCULAR; INTRAVENOUS at 17:41

## 2024-11-11 NOTE — ED PROVIDER NOTES
Time: 3:18 PM EST  Date of encounter:  2024  Independent Historian/Clinical History and Information was obtained by:   Patient    History is limited by: N/A    Chief Complaint   Patient presents with    Abdominal Pain    Flank Pain         History of Present Illness:  Patient is a 38 y.o. year old female who presents to the emergency department for evaluation of left upper quadrant abdominal pain and vomiting ongoing for 2 days.  Patient reports history of similar pain in the past when she had pancreatitis.  Denies any fevers, chills, body aches.  Denies any recent alcohol intake patient is status postcholecystectomy.  She has been vomiting but denies diarrhea.  Afebrile..    Patient Care Team  Primary Care Provider: Tricia Esparza MD    Past Medical History:     Allergies   Allergen Reactions    Sumatriptan Shortness Of Breath    Amoxicillin Hives     Hives and blisters    Tylenol [Acetaminophen] Hives     Past Medical History:   Diagnosis Date    Abnormal ECG     Acid reflux     Anemia     Anxiety 2014    Asthma     Bleeding disorder     Cholelithiasis     Clotting disorder     Deep vein thrombosis 2018    Depression     Foot sprain, right, initial encounter 10/21/2015    GERD (gastroesophageal reflux disease)     HTN (hypertension)     gestational    Migraine headache     Pancreatitis 2022    [facility]    Pineal gland cyst 2014    Renal cyst 2014     Past Surgical History:   Procedure Laterality Date     SECTION      CHOLECYSTECTOMY      COLONOSCOPY N/A 2023    Procedure: COLONOSCOPY WITH BIOPSIES;  Surgeon: Feliz Olguin MD;  Location: [facility] ENDOSCOPY;  Service: Gastroenterology;  Laterality: N/A;  NORMAL COLONOSCOPY    ENDOSCOPY N/A 10/22/2021    Procedure: ESOPHAGOGASTRODUODENOSCOPY WITH BIOPSIES;  Surgeon: Feliz Olguin MD;  Location: [facility] ENDOSCOPY;  Service: Gastroenterology;  Laterality: N/A;  NORMAL EGD    ENDOSCOPY N/A 2023     Procedure: ESOPHAGOGASTRODUODENOSCOPY WITH BIOPSIES;  Surgeon: Feliz Olguin MD;  Location: [facility] ENDOSCOPY;  Service: Gastroenterology;  Laterality: N/A;  HIATAL HERNIA    ENDOSCOPY N/A 3/22/2024    Procedure: ESOPHAGOGASTRODUODENOSCOPY WITH BIOPSIES;  Surgeon: Feliz Olguin MD;  Location: [facility] ENDOSCOPY;  Service: Gastroenterology;  Laterality: N/A;  RETAINED FOOD IN THE STOMACH, EROSIVE GASTRITIS, REFLUX ESOPHAGITIS    LASER ABLATION      TUBAL ABDOMINAL LIGATION  03/27/2012     Family History   Problem Relation Age of Onset    Diabetes Mother     Arthritis Mother     Anxiety disorder Mother     Hyperlipidemia Mother     Liver disease Mother     Stroke Mother     Thyroid disease Mother     Other Father         cardio vascular disease    Diabetes Father     Kidney failure Father     Arthritis Father     Colon cancer Neg Hx        Home Medications:  Prior to Admission medications    Medication Sig Start Date End Date Taking? Authorizing Provider   amitriptyline (ELAVIL) 25 MG tablet Take 1 tablet by mouth Every Night. 9/15/23   Khari Mar MD   cyclobenzaprine (FLEXERIL) 10 MG tablet Take 1 tablet by mouth 3 (Three) Times a Day As Needed for Muscle Spasms. 7/25/24   Radha Amato APRN   dicyclomine (BENTYL) 20 MG tablet Take 1 tablet by mouth Every 6 (Six) Hours As Needed for Abdominal Cramping. 7/25/24   Radha Amato APRN   fenofibrate (TRICOR) 145 MG tablet Take 1 tablet by mouth. 11/28/23   Khari Mar MD   levETIRAcetam (KEPPRA) 500 MG tablet Take 1 tablet by mouth 2 (Two) Times a Day.    ProviderKhari MD   montelukast (SINGULAIR) 10 MG tablet Take 1 tablet by mouth Every Night.    Khari Mar MD   ondansetron ODT (ZOFRAN-ODT) 4 MG disintegrating tablet Place 1 tablet on the tongue 4 (Four) Times a Day As Needed for Nausea or Vomiting. 7/25/24   Radha Amato APRN   Pancrelipase, Lip-Prot-Amyl, (Creon) 72346-653843 units capsule delayed-release particles  "capsule Take 1 capsule by mouth Take As Directed. Take 2 capsule with every meal and 1 capsule with snacks 10/20/23   Thao Valles APRN   pantoprazole (PROTONIX) 20 MG EC tablet Take 1 tablet by mouth Daily. 24   Seaver, Alyce B, APRN   propranolol (INDERAL) 60 MG tablet Take 1 tablet by mouth 2 (Two) Times a Day. 9/15/23   Khari aMr MD   rivaroxaban (XARELTO) 20 MG tablet Take 1 tablet by mouth Daily.    Khari Mar MD   sertraline (ZOLOFT) 50 MG tablet Take 1 tablet by mouth Daily. 1/3/23   Khari Mar MD   vitamin D (ERGOCALCIFEROL) 1.25 MG (14298 UT) capsule capsule Take 1 capsule by mouth 1 (One) Time Per Week. Monday    Khari Mar MD        Social History:   Social History     Tobacco Use    Smoking status: Former     Current packs/day: 0.00     Average packs/day: 1 pack/day for 15.0 years (15.0 ttl pk-yrs)     Types: Cigarettes     Start date: 2007     Quit date: 2022     Years since quittin.0     Passive exposure: Past    Smokeless tobacco: Never   Vaping Use    Vaping status: Never Used   Substance Use Topics    Alcohol use: Never    Drug use: Never         Review of Systems:  Review of Systems   Gastrointestinal:  Positive for abdominal pain and vomiting.        Physical Exam:  /95   Pulse 90   Temp 98.4 °F (36.9 °C) (Oral)   Resp 18   Ht 157.5 cm (62\")   Wt 101 kg (223 lb 1.7 oz)   SpO2 96%   BMI 40.81 kg/m²         Physical Exam  HENT:      Head: Normocephalic.      Mouth/Throat:      Mouth: Mucous membranes are moist.   Pulmonary:      Effort: Pulmonary effort is normal.   Abdominal:      General: There is no distension.   Musculoskeletal:      Cervical back: Neck supple.   Skin:     General: Skin is warm and dry.   Neurological:      General: No focal deficit present.      Mental Status: She is alert and oriented to person, place, and time.   Psychiatric:         Mood and Affect: Mood normal.         Behavior: Behavior " normal.                      Procedures:  Procedures      Medical Decision Making:      Comorbidities that affect care:    Anemia, asthma, depression, GERD, cholelithiasis, pancreatitis, anxiety    External Notes reviewed:    Hospital Discharge Summary:    Patient Name: Tram García  : 1985  MRN: [id number]     Date of Admission: 3/19/2024  Date of Discharge:  3/23/2024     Primary Care Physician: Tricia Esparza MD     Consults         Date and Time Order Name Status Description     3/20/2024  2:24 PM Inpatient Gastroenterology Consult Completed       3/19/2024  8:41 PM Inpatient Hospitalist Consult                    Active and Resolved Hospital Problems:       Active Hospital Problems     Diagnosis POA    **Pancreatitis [K85.90] Yes    Acute on chronic pancreatitis [K85.90, K86.1] Yes    Heartburn [R12] Unknown    Epigastric pain [R10.13] Unknown    GERD (gastroesophageal reflux disease) [K21.9] Unknown       Resolved Hospital Problems   No resolved problems to display.         Hospital Course      Hospital Course:  Tram García is a 38 y.o. female with past medical history of obesity with a BMI of 40, pancreatitis with extensive GI workup, altered to severe fatty liver on FibroScan, and he DVT on Xarelto who presents with abdominal pain in her epigastric area over the last 24-hour.  The patient has had abdominal pain for significant amount of time.  Through the records and through history is most notable the last year.  Last February the patient presented to gastroenterology at that time had an MRCP which showed fatty liver and fatty infiltration of her pancreas.  Patient proceeded to have an EGD and colonoscopy in 2023 that showed no abnormalities.  She was recently hospitalized at [facility] for pancreatitis from 3/8 to 3/12.  Her lipase was not elevated at the outside hospital or here on that admission but her CT did show mild pancreatitis.  Patient states she felt much better on day of  discharge and went home with no issues.  However, today started developing significant epigastric pain.  She was not able to eat or drink.  As result she came to the ER for further evaluation. In the emergency department the patient's vital signs are as follows: Temperature is 97.8, pulse 64, respiratory is 18, blood pressure 120/69, 97% on room air.  CBC does show a hemoconcentrated hemoglobin of 16.8 with no other abnormalities.  CMP shows mildly elevated LFTs with AST of 52 and ALT of 84 which is very comparable to previous elevations.  Bilirubin is normal.  Urinalysis shows no ketones in her urine.  Patient is normal.  CT scan the abdomen pelvis shows resolving interstitial pancreatitis.  There is infiltration and atrophy of the pancreas possibly related to chronic pancreatitis or metabolic syndrome in the setting of marked hepatic steatosis.  The patient was admitted to hospital for observation for resuscitation and pain control. Patient underwent MRCP which was unremarkable.  It showed no residual active inflammation or duct dilatation.  Patient also underwent EGD which showed esophagitis.  It is recommended that patient start on Protonix.  Patient will be discharged home today in stable condition.  Patient will need close follow-up up with GI for continued abdominal pain and chronic intermittent pancreatitis.       The following orders were placed and all results were independently analyzed by me:  Orders Placed This Encounter   Procedures    CT Abdomen Pelvis With Contrast    Timbo Draw    Comprehensive Metabolic Panel    Lipase    Urinalysis With Microscopic If Indicated (No Culture) - Urine, Clean Catch    hCG, Quantitative, Pregnancy    CBC Auto Differential    P.o. challenge fluids please  Nursing Communication    Insert Peripheral IV    CBC & Differential    Green Top (Gel)    Lavender Top    Gold Top - SST    Light Blue Top       Medications Given in the Emergency Department:  Medications   sodium  chloride 0.9 % flush 10 mL (has no administration in time range)   sodium chloride 0.9 % bolus 1,000 mL (0 mL Intravenous Stopped 11/11/24 1932)   iopamidol (ISOVUE-370) 76 % injection 100 mL (100 mL Intravenous Given 11/11/24 1708)   ketorolac (TORADOL) injection 30 mg (30 mg Intravenous Given 11/11/24 1741)   oxyCODONE-acetaminophen (PERCOCET) 5-325 MG per tablet 1 tablet (1 tablet Oral Given 11/11/24 1931)        ED Course:    The patient was initially evaluated in the triage area where orders were placed. The patient was later dispositioned by Ramon Conner MD.      The patient was advised to stay for completion of workup which includes but is not limited to communication of labs and radiological results, reassessment and plan. The patient was advised that leaving prior to disposition by a provider could result in critical findings that are not communicated to the patient.     ED Course as of 11/11/24 1941 Mon Nov 11, 2024   1519   --- PROVIDER IN TRIAGE NOTE ---    The patient was evaluated by Shruti rai in triage. Orders were placed and the patient is currently awaiting disposition.      [CE]   1915 I did confirm with the patient she is able to take oxycodone as she has some at home. [RP]      ED Course User Index  [CE] Shruti Hawkins P, APRN  [RP] Ramon Conner MD       Labs:    Lab Results (last 24 hours)       Procedure Component Value Units Date/Time    CBC & Differential [[id number]]  (Abnormal) Collected: 11/11/24 1529    Specimen: Blood Updated: 11/11/24 1540    Narrative:      The following orders were created for panel order CBC & Differential.  Procedure                               Abnormality         Status                     ---------                               -----------         ------                     CBC Auto Differential[[id number]]        Abnormal            Final result                 Please view results for these tests on the individual orders.    Comprehensive  Metabolic Panel [[id number]]  (Abnormal) Collected: 11/11/24 1529    Specimen: Blood Updated: 11/11/24 1605     Glucose 120 mg/dL      BUN 10 mg/dL      Creatinine 0.88 mg/dL      Sodium 137 mmol/L      Potassium 4.1 mmol/L      Chloride 104 mmol/L      CO2 22.1 mmol/L      Calcium 8.9 mg/dL      Total Protein 7.2 g/dL      Albumin 4.3 g/dL      ALT (SGPT) 72 U/L      AST (SGOT) 55 U/L      Alkaline Phosphatase 89 U/L      Total Bilirubin 0.5 mg/dL      Globulin 2.9 gm/dL      A/G Ratio 1.5 g/dL      BUN/Creatinine Ratio 11.4     Anion Gap 10.9 mmol/L      eGFR 85.9 mL/min/1.73     Narrative:      GFR Normal >60  Chronic Kidney Disease <60  Kidney Failure <15      Lipase [[id number]]  (Normal) Collected: 11/11/24 1529    Specimen: Blood Updated: 11/11/24 1605     Lipase 32 U/L     hCG, Quantitative, Pregnancy [[id number]] Collected: 11/11/24 1529    Specimen: Blood Updated: 11/11/24 1600     HCG Quantitative <0.50 mIU/mL     Narrative:      HCG Ranges by Gestational Age    Females - non-pregnant premenopausal   </= 1mIU/mL HCG  Females - postmenopausal               </= 7mIU/mL HCG    3 Weeks       5.4   -      72 mIU/mL  4 Weeks      10.2   -     708 mIU/mL  5 Weeks       217   -   8,245 mIU/mL  6 Weeks       152   -  32,177 mIU/mL  7 Weeks     4,059   - 153,767 mIU/mL  8 Weeks    31,366   - 149,094 mIU/mL  9 Weeks    59,109   - 135,901 mIU/mL  10 Weeks   44,186   - 170,409 mIU/mL  12 Weeks   27,107   - 201,615 mIU/mL  14 Weeks   24,302   -  93,646 mIU/mL  15 Weeks   12,540   -  69,747 mIU/mL  16 Weeks    8,904   -  55,332 mIU/mL  17 Weeks    8,240   -  51,793 mIU/mL  18 Weeks    9,649   -  55,271 mIU/mL      CBC Auto Differential [[id number]]  (Abnormal) Collected: 11/11/24 1529    Specimen: Blood Updated: 11/11/24 1540     WBC 9.04 10*3/mm3      RBC 5.50 10*6/mm3      Hemoglobin 16.8 g/dL      Hematocrit 50.1 %      MCV 91.1 fL      MCH 30.5 pg      MCHC 33.5 g/dL      RDW 13.8 %      RDW-SD 46.5 fl      MPV 9.9 fL       Platelets 220 10*3/mm3      Neutrophil % 31.1 %      Lymphocyte % 53.1 %      Monocyte % 7.6 %      Eosinophil % 7.2 %      Basophil % 0.8 %      Immature Grans % 0.2 %      Neutrophils, Absolute 2.81 10*3/mm3      Lymphocytes, Absolute 4.80 10*3/mm3      Monocytes, Absolute 0.69 10*3/mm3      Eosinophils, Absolute 0.65 10*3/mm3      Basophils, Absolute 0.07 10*3/mm3      Immature Grans, Absolute 0.02 10*3/mm3      nRBC 0.0 /100 WBC     Urinalysis With Microscopic If Indicated (No Culture) - Urine, Clean Catch [[id number]]  (Abnormal) Collected: 11/11/24 1538    Specimen: Urine, Clean Catch Updated: 11/11/24 1601     Color, UA Yellow     Appearance, UA Clear     pH, UA 8.5     Specific Gravity, UA 1.013     Glucose, UA Negative     Ketones, UA Negative     Bilirubin, UA Negative     Blood, UA Negative     Protein, UA Negative     Leuk Esterase, UA Negative     Nitrite, UA Negative     Urobilinogen, UA 1.0 E.U./dL    Narrative:      Urine microscopic not indicated.             Imaging:    CT Abdomen Pelvis With Contrast    Result Date: 11/11/2024  CT ABDOMEN PELVIS W CONTRAST Date of Exam: 11/11/2024 4:50 PM EST Indication: LUQ pain. Comparison: 9/20/2024, 7/25/2024 Technique: Axial CT images were obtained of the abdomen and pelvis after the uneventful intravenous administration of iodinated contrast. Reconstructed coronal and sagittal images were also obtained. Automated exposure control and iterative construction methods were used. Findings: Lung bases demonstrate mild atelectasis. The abdominal wall appears stable. There could be mild hepatic steatosis. There is borderline hepatomegaly liver measures 20 cm in length. Normal-appearing spleen and adrenal glands. There is no suspicious renal lesion. There is a punctate 3 mm left-sided nephrolith. There is no obstructive uropathy. There are tubal clips are noted. The adnexa appear stable. Several follicles and cysts are similar. There is likely mild left  hydrosalpinx similar to previous exam Mild diverticulosis without definite diverticulitis. Moderate stool in the colon. Normal appendix. Stomach is normal. No small bowel finding. Stable and normal appearing aorta. No adenopathy. No ascites evident. Pancreas is a similar appearance to prior exam with multiple areas of fatty infiltration. Narrowing of the proximal celiac artery is again noted similar to previous exam. No acute osseous finding.     Impression: Hepatic steatosis. Borderline hepatomegaly. Punctate nonobstructing left-sided nephrolith. Areas of fatty infiltration of the pancreas without definite inflammatory process. Correlate with lipase. Patient has a history of previous pancreatitis. Cholecystectomy. Tubal ligation clips. There may be mild left hydrosalpinx similar to previous exams. Severe narrowing at the origin of the celiac artery appears similar to previous exam. 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 This was also suggested on prior CT 3/19/2024. Correlate with patient's history. Electronically Signed: Boaz Acosta MD  11/11/2024 5:42 PM EST  Workstation ID: [id number]       Differential Diagnosis and Discussion:      Abdominal Pain: Based on the patient's signs and symptoms, I considered abdominal aortic aneurysm, small bowel obstruction, pancreatitis, acute cholecystitis, acute appendecitis, peptic ulcer disease, gastritis, colitis, endocrine disorders, irritable bowel syndrome and other differential diagnosis an etiology of the patient's abdominal pain.    All labs were reviewed and interpreted by me.  CT scan radiology impression was interpreted by me.    [facility]                   Patient Care Considerations:    ANTIBIOTICS: I considered prescribing antibiotics as an outpatient however no bacterial focus of infection was found.      Consultants/Shared Management Plan:    None    Social Determinants of Health:    Patient is independent, reliable, and  has access to care.       Disposition and Care Coordination:    Discharged: I considered escalation of care by admitting this patient to the hospital, however patient is p.o. tolerant here in the emergency department.  Her Ajay score is very low and she is hemodynamically stable.  This is a chronic recurrent issue.  Her CT shows no acute findings.    I have explained the patient´s condition, diagnoses and treatment plan based on the information available to me at this time. I have answered questions and addressed any concerns. The patient has a good  understanding of the patient´s diagnosis, condition, and treatment plan as can be expected at this point. The vital signs have been stable. The patient´s condition is stable and appropriate for discharge from the emergency department.      The patient will pursue further outpatient evaluation with the primary care physician or other designated or consulting physician as outlined in the discharge instructions. They are agreeable to this plan of care and follow-up instructions have been explained in detail. The patient has received these instructions in written format and has expressed an understanding of the discharge instructions. The patient is aware that any significant change in condition or worsening of symptoms should prompt an immediate return to this or the closest emergency department or call to 911.    Final diagnoses:   Idiopathic chronic pancreatitis        ED Disposition       ED Disposition   Discharge    Condition   Stable    Comment   --               This medical record created using voice recognition software.             Ramon Conner MD  11/11/24 1942

## 2024-11-12 NOTE — DISCHARGE INSTRUCTIONS
Stay very well-hydrated with only water and sports drinks.  Avoid caffeine until your symptoms have resolved.  Return to the ER for fever, uncontrolled pain, uncontrolled vomiting.

## 2024-11-26 ENCOUNTER — HOSPITAL ENCOUNTER (EMERGENCY)
Facility: HOSPITAL | Age: 39
Discharge: HOME | End: 2024-11-26
Attending: EMERGENCY MEDICINE | Admitting: EMERGENCY MEDICINE
Payer: MEDICARE

## 2024-11-26 VITALS
BODY MASS INDEX: 39.38 KG/M2 | OXYGEN SATURATION: 96 % | HEIGHT: 62 IN | DIASTOLIC BLOOD PRESSURE: 83 MMHG | RESPIRATION RATE: 18 BRPM | WEIGHT: 214 LBS | SYSTOLIC BLOOD PRESSURE: 140 MMHG | TEMPERATURE: 98.3 F | HEART RATE: 97 BPM

## 2024-11-26 DIAGNOSIS — M25.811 IMPINGEMENT OF RIGHT SHOULDER: ICD-10-CM

## 2024-11-26 DIAGNOSIS — M25.511 ACUTE PAIN OF RIGHT SHOULDER: Primary | ICD-10-CM

## 2024-11-26 PROCEDURE — 97110 THERAPEUTIC EXERCISES: CPT | Performed by: PHYSICAL THERAPIST

## 2024-11-26 PROCEDURE — 96372 THER/PROPH/DIAG INJ SC/IM: CPT

## 2024-11-26 PROCEDURE — 25010000002 DEXAMETHASONE SODIUM PHOSPHATE 10 MG/ML SOLUTION

## 2024-11-26 PROCEDURE — 97161 PT EVAL LOW COMPLEX 20 MIN: CPT | Performed by: PHYSICAL THERAPIST

## 2024-11-26 PROCEDURE — 25010000002 ORPHENADRINE CITRATE PER 60 MG

## 2024-11-26 PROCEDURE — 99283 EMERGENCY DEPT VISIT LOW MDM: CPT

## 2024-11-26 RX ORDER — AMITRIPTYLINE HYDROCHLORIDE 50 MG/1
50 TABLET ORAL
COMMUNITY
Start: 2024-08-16

## 2024-11-26 RX ORDER — METHYLPREDNISOLONE 4 MG/1
TABLET ORAL
Qty: 21 TABLET | Refills: 0 | Status: SHIPPED | OUTPATIENT
Start: 2024-11-26

## 2024-11-26 RX ORDER — NAPROXEN 500 MG/1
500 TABLET ORAL
COMMUNITY
Start: 2024-11-22

## 2024-11-26 RX ORDER — METAXALONE 800 MG/1
800 TABLET ORAL 3 TIMES DAILY PRN
Qty: 20 TABLET | Refills: 0 | Status: SHIPPED | OUTPATIENT
Start: 2024-11-26

## 2024-11-26 RX ORDER — LIDOCAINE 50 MG/G
1 PATCH TOPICAL EVERY 24 HOURS
Qty: 14 EACH | Refills: 0 | Status: SHIPPED | OUTPATIENT
Start: 2024-11-26

## 2024-11-26 RX ORDER — DEXAMETHASONE SODIUM PHOSPHATE 10 MG/ML
10 INJECTION, SOLUTION INTRAMUSCULAR; INTRAVENOUS ONCE
Status: COMPLETED | OUTPATIENT
Start: 2024-11-26 | End: 2024-11-26

## 2024-11-26 RX ORDER — ORPHENADRINE CITRATE 30 MG/ML
60 INJECTION INTRAMUSCULAR; INTRAVENOUS ONCE
Status: COMPLETED | OUTPATIENT
Start: 2024-11-26 | End: 2024-11-26

## 2024-11-26 RX ADMIN — DEXAMETHASONE SODIUM PHOSPHATE 10 MG: 10 INJECTION INTRAMUSCULAR; INTRAVENOUS at 17:01

## 2024-11-26 RX ADMIN — ORPHENADRINE CITRATE 60 MG: 60 INJECTION INTRAMUSCULAR; INTRAVENOUS at 17:01

## 2024-11-26 NOTE — ED PROVIDER NOTES
Time: 4:40 PM EST  Date of encounter:  2024  Independent Historian/Clinical History and Information was obtained by:   Patient    History is limited by: N/A    Chief Complaint: Shoulder pain      History of Present Illness:  Patient is a 39 y.o. year old female who presents to the emergency department for evaluation of right shoulder pain ongoing for 3 weeks.  Patient denies any injuries.  Patient reports she was seen at [place] and had x-rays completed that were negative at that time.  Reports she was given naproxen which she has been taking without relief.  Denies neck pain.      Patient Care Team  Primary Care Provider: Tricia Esparza MD    Past Medical History:     Allergies   Allergen Reactions    Sumatriptan Shortness Of Breath    Amoxicillin Hives     Hives and blisters    Tylenol [Acetaminophen] Hives     Past Medical History:   Diagnosis Date    Abnormal ECG     Acid reflux     Anemia     Anxiety 2014    Asthma     Bleeding disorder     Cholelithiasis     Clotting disorder     Deep vein thrombosis     Depression     Foot sprain, right, initial encounter 10/21/2015    GERD (gastroesophageal reflux disease)     HTN (hypertension)     gestational    Migraine headache     Pancreatitis 2022    [facility]    Pineal gland cyst 2014    Renal cyst 2014     Past Surgical History:   Procedure Laterality Date     SECTION      CHOLECYSTECTOMY      COLONOSCOPY N/A 2023    Procedure: COLONOSCOPY WITH BIOPSIES;  Surgeon: Feliz Olguin MD;  Location: [facility] ENDOSCOPY;  Service: Gastroenterology;  Laterality: N/A;  NORMAL COLONOSCOPY    ENDOSCOPY N/A 10/22/2021    Procedure: ESOPHAGOGASTRODUODENOSCOPY WITH BIOPSIES;  Surgeon: Feliz Olguin MD;  Location: [facility] ENDOSCOPY;  Service: Gastroenterology;  Laterality: N/A;  NORMAL EGD    ENDOSCOPY N/A 2023    Procedure: ESOPHAGOGASTRODUODENOSCOPY WITH BIOPSIES;  Surgeon: Feliz Olguin MD;   Location: [facility] ENDOSCOPY;  Service: Gastroenterology;  Laterality: N/A;  HIATAL HERNIA    ENDOSCOPY N/A 3/22/2024    Procedure: ESOPHAGOGASTRODUODENOSCOPY WITH BIOPSIES;  Surgeon: Feliz Olguin MD;  Location: [facility] ENDOSCOPY;  Service: Gastroenterology;  Laterality: N/A;  RETAINED FOOD IN THE STOMACH, EROSIVE GASTRITIS, REFLUX ESOPHAGITIS    LASER ABLATION      TUBAL ABDOMINAL LIGATION  03/27/2012     Family History   Problem Relation Age of Onset    Diabetes Mother     Arthritis Mother     Anxiety disorder Mother     Hyperlipidemia Mother     Liver disease Mother     Stroke Mother     Thyroid disease Mother     Other Father         cardio vascular disease    Diabetes Father     Kidney failure Father     Arthritis Father     Colon cancer Neg Hx        Home Medications:  Prior to Admission medications    Medication Sig Start Date End Date Taking? Authorizing Provider   amitriptyline (ELAVIL) 50 MG tablet Take 1 tablet by mouth every night at bedtime. 8/16/24  Yes Khari Mar MD   naproxen (NAPROSYN) 500 MG tablet Take 1 tablet by mouth. 11/22/24  Yes Khari Mar MD   amitriptyline (ELAVIL) 25 MG tablet Take 1 tablet by mouth Every Night. 9/15/23   Khari Mar MD   cyclobenzaprine (FLEXERIL) 10 MG tablet Take 1 tablet by mouth 3 (Three) Times a Day As Needed for Muscle Spasms. 7/25/24   Radha Amato APRN   dicyclomine (BENTYL) 20 MG tablet Take 1 tablet by mouth Every 6 (Six) Hours As Needed for Abdominal Cramping. 7/25/24   Radha Amato APRN   fenofibrate (TRICOR) 145 MG tablet Take 1 tablet by mouth. 11/28/23   Khari Mar MD   levETIRAcetam (KEPPRA) 500 MG tablet Take 1 tablet by mouth 2 (Two) Times a Day.    Khari Mar MD   montelukast (SINGULAIR) 10 MG tablet Take 1 tablet by mouth Every Night.    Khari Mar MD   ondansetron ODT (ZOFRAN-ODT) 4 MG disintegrating tablet Place 1 tablet on the tongue 4 (Four) Times a Day As Needed for Nausea or  Vomiting. 24   Radha Amato APRN   ondansetron ODT (ZOFRAN-ODT) 4 MG disintegrating tablet Place 1 tablet on the tongue Every 6 (Six) Hours As Needed for Nausea or Vomiting. 24   Ramon Conner MD   Pancrelipase, Lip-Prot-Amyl, (Creon) 23328-881444 units capsule delayed-release particles capsule Take 1 capsule by mouth Take As Directed. Take 2 capsule with every meal and 1 capsule with snacks 10/20/23   Thao Valles APRN   pantoprazole (PROTONIX) 20 MG EC tablet Take 1 tablet by mouth Daily. 24   Seaver, Alyce B, APRN   propranolol (INDERAL) 60 MG tablet Take 1 tablet by mouth 2 (Two) Times a Day. 9/15/23   Khari Mar MD   rivaroxaban (XARELTO) 20 MG tablet Take 1 tablet by mouth Daily.    Khari Mar MD   sertraline (ZOLOFT) 50 MG tablet Take 1 tablet by mouth Daily. 1/3/23   Khari Mar MD   vitamin D (ERGOCALCIFEROL) 1.25 MG (21653 UT) capsule capsule Take 1 capsule by mouth 1 (One) Time Per Week. Monday    Khari Mar MD        Social History:   Social History     Tobacco Use    Smoking status: Former     Current packs/day: 0.00     Average packs/day: 1 pack/day for 15.0 years (15.0 ttl pk-yrs)     Types: Cigarettes     Start date: 2007     Quit date: 2022     Years since quittin.0     Passive exposure: Past    Smokeless tobacco: Never   Vaping Use    Vaping status: Never Used   Substance Use Topics    Alcohol use: Never    Drug use: Never         Review of Systems:  Review of Systems   Constitutional:  Negative for chills, fatigue and fever.   HENT:  Negative for ear pain, rhinorrhea and sore throat.    Eyes:  Negative for visual disturbance.   Respiratory:  Negative for cough and shortness of breath.    Cardiovascular:  Negative for chest pain.   Gastrointestinal:  Negative for abdominal pain, diarrhea and vomiting.   Genitourinary:  Negative for difficulty urinating.   Musculoskeletal:  Positive for arthralgias. Negative for back  "pain and myalgias.   Skin:  Negative for rash.   Neurological:  Negative for light-headedness and headaches.   Hematological:  Negative for adenopathy.   Psychiatric/Behavioral: Negative.          Physical Exam:  /83 (BP Location: Left arm, Patient Position: Sitting)   Pulse 97   Temp 98.3 °F (36.8 °C) (Oral)   Resp 18   Ht 157.5 cm (62\")   Wt 97.1 kg (214 lb)   SpO2 96%   BMI 39.14 kg/m²     Physical Exam  Vitals and nursing note reviewed.   Constitutional:       General: She is not in acute distress.     Appearance: Normal appearance. She is not toxic-appearing.   HENT:      Head: Normocephalic and atraumatic.      Nose: Nose normal.      Mouth/Throat:      Mouth: Mucous membranes are moist.   Eyes:      Conjunctiva/sclera: Conjunctivae normal.   Cardiovascular:      Rate and Rhythm: Normal rate.      Pulses: Normal pulses.   Pulmonary:      Effort: Pulmonary effort is normal.   Musculoskeletal:         General: Tenderness (Right shoulder) present. Normal range of motion.      Cervical back: Normal range of motion.   Skin:     General: Skin is warm and dry.   Neurological:      General: No focal deficit present.      Mental Status: She is alert and oriented to person, place, and time.   Psychiatric:         Mood and Affect: Mood normal.         Behavior: Behavior normal.         Thought Content: Thought content normal.         Judgment: Judgment normal.                  Procedures:  Procedures      Medical Decision Making:      Comorbidities that affect care:    Asthma, Hypertension    External Notes reviewed:    None      The following orders were placed and all results were independently analyzed by me:  Orders Placed This Encounter   Procedures    Obtain & Apply The Following- Upper extremity; Sling    PT Consult: Eval & Treat Functional Mobility Below Baseline    PT Plan of Care Cert / Re-Cert       Medications Given in the Emergency Department:  Medications   dexAMETHasone sodium phosphate " injection 10 mg (10 mg Intramuscular Given 11/26/24 1701)   orphenadrine (NORFLEX) injection 60 mg (60 mg Intramuscular Given 11/26/24 1701)        ED Course:         Labs:    Lab Results (last 24 hours)       ** No results found for the last 24 hours. **             Imaging:    No Radiology Exams Resulted Within Past 24 Hours      Differential Diagnosis and Discussion:    Orthopedic Injuries: Differential diagnosis includes but is not limited to fractures, soft tissue injuries, dislocations, contusions, ligamentous injuries, tendon injuries, nerve injuries, compartment syndrome, bursitis, and vascular injuries.        MDM  Number of Diagnoses or Management Options  Acute pain of right shoulder  Diagnosis management comments: I have explained the patient´s condition, diagnoses and treatment plan based on the information available to me at this time. I have answered questions and addressed any concerns. The patient has a good  understanding of the patient´s diagnosis, condition, and treatment plan as can be expected at this point. The vital signs have been stable. The patient´s condition is stable and appropriate for discharge from the emergency department.      The patient will pursue further outpatient evaluation with the primary care physician or other designated or consulting physician as outlined in the discharge instructions. They are agreeable to this plan of care and follow-up instructions have been explained in detail. The patient has received these instructions in written format and has expressed an understanding of the discharge instructions. The patient is aware that any significant change in condition or worsening of symptoms should prompt an immediate return to this or the closest emergency department or call to 911.                         Patient Care Considerations:    NARCOTICS: I considered prescribing opiate pain medication as an outpatient, however patient's pain is managed without the use of  narcotics in the ED.      Consultants/Shared Management Plan:    None    Social Determinants of Health:    Patient is independent, reliable, and has access to care.       Disposition and Care Coordination:    Discharged: The patient is suitable and stable for discharge with no need for consideration of admission.    I have explained the patient´s condition, diagnoses and treatment plan based on the information available to me at this time. I have answered questions and addressed any concerns. The patient has a good  understanding of the patient´s diagnosis, condition, and treatment plan as can be expected at this point. The vital signs have been stable. The patient´s condition is stable and appropriate for discharge from the emergency department.      The patient will pursue further outpatient evaluation with the primary care physician or other designated or consulting physician as outlined in the discharge instructions. They are agreeable to this plan of care and follow-up instructions have been explained in detail. The patient has received these instructions in written format and has expressed an understanding of the discharge instructions. The patient is aware that any significant change in condition or worsening of symptoms should prompt an immediate return to this or the closest emergency department or call to 911.  I have explained discharge medications and the need for follow up with the patient/caretakers. This was also printed in the discharge instructions. Patient was discharged with the following medications and follow up:      Medication List        New Prescriptions      lidocaine 5 %  Commonly known as: LIDODERM  Place 1 patch on the skin as directed by provider Daily. Remove & Discard patch within 12 hours or as directed by MD     metaxalone 800 MG tablet  Commonly known as: SKELAXIN  Take 1 tablet by mouth 3 (Three) Times a Day As Needed for Muscle Spasms.     methylPREDNISolone 4 MG dose  pack  Commonly known as: MEDROL  Take as directed on package instructions.            Stop      cyclobenzaprine 10 MG tablet  Commonly known as: FLEXERIL               Where to Get Your Medications        These medications were sent to [facility] - [place], [place] - [address] - [phone]  - [phone] FX  [address] [facility] [place] [address]      Phone: [phone]   lidocaine 5 %  metaxalone 800 MG tablet  methylPREDNISolone 4 MG dose pack      Tricia Esparza MD  [address] [place] [id number] [phone]    Go to   As needed       Final diagnoses:   Acute pain of right shoulder        ED Disposition       ED Disposition   Discharge    Condition   Stable    Comment   --               This medical record created using voice recognition software.             Shruti Hawkins P, APRN  11/26/24 1749

## 2024-11-26 NOTE — DISCHARGE INSTRUCTIONS
Please follow with your primary care provider in 1 week for reevaluation.  Return to the ED for any new or worsening concerning symptoms.  You have been prescribed new medication to  at your pharmacy today and begin taking.

## 2024-11-26 NOTE — THERAPY EVALUATION
Patient Name: Tram García  : 1985    MRN: [id number]                              Today's Date: 2024       Admit Date: 2024    Visit Dx:     ICD-10-CM ICD-9-CM   1. Acute pain of right shoulder  M25.511 719.41   2. Impingement of right shoulder  M25.811 719.81     Patient Active Problem List   Diagnosis    Anemia    Depression    GERD (gastroesophageal reflux disease)    Hypertension    Migraine    Epigastric pain    Heartburn    Generalized abdominal pain    Diarrhea    Pancreatitis    History of DVT (deep vein thrombosis)    Acute on chronic pancreatitis     Past Medical History:   Diagnosis Date    Abnormal ECG     Acid reflux     Anemia     Anxiety 2014    Asthma     Bleeding disorder     Cholelithiasis     Clotting disorder     Deep vein thrombosis 2018    Depression     Foot sprain, right, initial encounter 10/21/2015    GERD (gastroesophageal reflux disease)     HTN (hypertension)     gestational    Migraine headache     Pancreatitis 2022    [facility]    Pineal gland cyst 2014    Renal cyst 2014     Past Surgical History:   Procedure Laterality Date     SECTION      CHOLECYSTECTOMY      COLONOSCOPY N/A 2023    Procedure: COLONOSCOPY WITH BIOPSIES;  Surgeon: Feliz Olguin MD;  Location: [facility] ENDOSCOPY;  Service: Gastroenterology;  Laterality: N/A;  NORMAL COLONOSCOPY    ENDOSCOPY N/A 10/22/2021    Procedure: ESOPHAGOGASTRODUODENOSCOPY WITH BIOPSIES;  Surgeon: Feliz Olguin MD;  Location: [facility] ENDOSCOPY;  Service: Gastroenterology;  Laterality: N/A;  NORMAL EGD    ENDOSCOPY N/A 2023    Procedure: ESOPHAGOGASTRODUODENOSCOPY WITH BIOPSIES;  Surgeon: Feliz Olguin MD;  Location: [facility] ENDOSCOPY;  Service: Gastroenterology;  Laterality: N/A;  HIATAL HERNIA    ENDOSCOPY N/A 3/22/2024    Procedure: ESOPHAGOGASTRODUODENOSCOPY WITH BIOPSIES;  Surgeon: Feliz Olguin MD;  Location: [facility] ENDOSCOPY;  Service:  Gastroenterology;  Laterality: N/A;  RETAINED FOOD IN THE STOMACH, EROSIVE GASTRITIS, REFLUX ESOPHAGITIS    LASER ABLATION      TUBAL ABDOMINAL LIGATION  03/27/2012      General Information       Row Name 11/26/24 1522          Physical Therapy Time and Intention    Document Type evaluation  -LR     Mode of Treatment individual therapy  -LR       Row Name 11/26/24 1522          General Information    Patient Profile Reviewed yes  -LR     Prior Level of Function independent:  -LR               User Key  (r) = Recorded By, (t) = Taken By, (c) = Cosigned By      Initials Name Provider Type    LR Omayra Nowak, PT Physical Therapist                  History: Patient reports she has had pain in her right shoulder for 3 weeks with no known injury.  She states the pain is in the front of her shoulder and she has achiness down her arm.  She denies numbness and tingling and denies neck pain.  Pain is increased with movement of her right shoulder.  She is right-hand dominant.  Pain is a 9/10.  She was seen at the hospital in [place] and x-rays were negative.  She has not taken any medicine for the pain.    Objective:  Posture: Rounded shoulders    Palpation: Tender to palpation at right anterior shoulder at supraspinatus    ROM:  Active Shoulder ROM:  L Shoulder ROM:  R Shoulder ROM:  Flexion: NT   Flexion: 100°  Abduction: NT   Abduction: 90°  External Rotation: NT  External Rotation: 20°  Internal Rotation: NT  Internal Rotation: unable     Strength:  L Shoulder MMT:   R Shoulder MMT:  Flexion: NT   Flexion: 4+  Abduction: NT   Abduction: 4+  External Rotation: NT  External Rotation: 4+  Internal Rotation: NT  Internal Rotation: 5    R elbow strength: 5/5    Special Tests:  Neer Test: Positive on right  Whiting-Maynor Test: Positive on right  Cross Arm Adduction Test: Positive on right  Crank Test: NT  Empty Can Test: Strong but painful on right  Speeds Test: NT  Drop Arm Test: Negative on right    Sensation: Right UE  sensation intact to light touch    Assessment/Plan:   Pt presents with a diagnosis of right shoulder pain and has signs and symptoms consistent with right shoulder impingement with decreased shoulder range of motion and strength that are limiting her ability to reach overhead.  The patient was educated in exercises to perform at home to improve shoulder mobility.  She was provided with HEP handout.    Goals:   LTG 1: The patient will be independent in HEP in order to decrease pain and improve tolerance to functional activities.  STATUS: Met    Interventions:   Manual Therapy: Not performed    Therapeutic Exercises: HEP: Dowel flexion, dowel abduction, dowel external rotation, table slides into flexion/abduction, towel internal rotation stretch, shoulder pendulums    Modalities: Not performed     Outcome Measures       Row Name 11/26/24 1522          Optimal Instrument    Optimal Instrument Optimal - 3  -LR     Reaching 4  -LR     Lifting 4  -LR     Carrying 4  -LR     From the list, choose the 3 activities you would most like to be able to do without any difficulty Reaching;Lifting;Carrying  -LR     Total Score Optimal - 3 12  -LR       Row Name 11/26/24 1522          Functional Assessment    Outcome Measure Options Optimal Instrument  -LR               User Key  (r) = Recorded By, (t) = Taken By, (c) = Cosigned By      Initials Name Provider Type    Omayra Bustamante, PT Physical Therapist                       Time Calculation:   PT Evaluation Complexity  History, PT Evaluation Complexity: 3 or more personal factors and/or comorbidities  Examination of Body Systems (PT Eval Complexity): 1-2 elements  Clinical Presentation (PT Evaluation Complexity): stable  Clinical Decision Making (PT Evaluation Complexity): low complexity  Overall Complexity (PT Evaluation Complexity): low complexity     PT Charges       Row Name 11/26/24 1522             Time Calculation    PT Received On 11/26/24  -LR         Timed Charges     36503 - PT Therapeutic Exercise Minutes 10  -LR         Untimed Charges    PT Eval/Re-eval Minutes 12  -LR         Total Minutes    Timed Charges Total Minutes 10  -LR      Untimed Charges Total Minutes 12  -LR       Total Minutes 22  -LR                User Key  (r) = Recorded By, (t) = Taken By, (c) = Cosigned By      Initials Name Provider Type    LR Omayra Nowak, PT Physical Therapist                  Therapy Charges for Today       Code Description Service Date Service Provider Modifiers Qty    62261523516 HC PT THER PROC EA 15 MIN 11/26/2024 Omayra Nowak, PT GP 1    64018854861 HC PT EVAL LOW COMPLEXITY 1 11/26/2024 Omayra Nowak, PT GP 1            PT G-Codes  Outcome Measure Options: Optimal Instrument       Omayra Nowak, PT  11/26/2024

## 2024-12-28 ENCOUNTER — APPOINTMENT (OUTPATIENT)
Dept: GENERAL RADIOLOGY | Facility: HOSPITAL | Age: 39
End: 2024-12-28
Payer: MEDICARE

## 2024-12-28 ENCOUNTER — HOSPITAL ENCOUNTER (EMERGENCY)
Facility: HOSPITAL | Age: 39
Discharge: HOME | End: 2024-12-28
Attending: EMERGENCY MEDICINE
Payer: MEDICARE

## 2024-12-28 ENCOUNTER — APPOINTMENT (OUTPATIENT)
Dept: CT IMAGING | Facility: HOSPITAL | Age: 39
End: 2024-12-28
Payer: MEDICARE

## 2024-12-28 VITALS
DIASTOLIC BLOOD PRESSURE: 105 MMHG | WEIGHT: 220.24 LBS | HEART RATE: 82 BPM | TEMPERATURE: 99.2 F | OXYGEN SATURATION: 93 % | RESPIRATION RATE: 21 BRPM | HEIGHT: 62 IN | SYSTOLIC BLOOD PRESSURE: 133 MMHG | BODY MASS INDEX: 40.53 KG/M2

## 2024-12-28 DIAGNOSIS — J20.9 ACUTE BRONCHITIS, UNSPECIFIED ORGANISM: Primary | ICD-10-CM

## 2024-12-28 DIAGNOSIS — J98.01 BRONCHOSPASM: ICD-10-CM

## 2024-12-28 LAB
ALBUMIN SERPL-MCNC: 4.3 G/DL (ref 3.5–5.2)
ALBUMIN/GLOB SERPL: 1.5 G/DL
ALP SERPL-CCNC: 87 U/L (ref 39–117)
ALT SERPL W P-5'-P-CCNC: 98 U/L (ref 1–33)
ANION GAP SERPL CALCULATED.3IONS-SCNC: 11.9 MMOL/L (ref 5–15)
AST SERPL-CCNC: 79 U/L (ref 1–32)
BASOPHILS # BLD AUTO: 0.06 10*3/MM3 (ref 0–0.2)
BASOPHILS NFR BLD AUTO: 0.8 % (ref 0–1.5)
BILIRUB SERPL-MCNC: 0.7 MG/DL (ref 0–1.2)
BUN SERPL-MCNC: 7 MG/DL (ref 6–20)
BUN/CREAT SERPL: 11.1 (ref 7–25)
CALCIUM SPEC-SCNC: 9.4 MG/DL (ref 8.6–10.5)
CHLORIDE SERPL-SCNC: 103 MMOL/L (ref 98–107)
CO2 SERPL-SCNC: 23.1 MMOL/L (ref 22–29)
CREAT SERPL-MCNC: 0.63 MG/DL (ref 0.57–1)
DEPRECATED RDW RBC AUTO: 45.6 FL (ref 37–54)
EGFRCR SERPLBLD CKD-EPI 2021: 115.2 ML/MIN/1.73
EOSINOPHIL # BLD AUTO: 0.54 10*3/MM3 (ref 0–0.4)
EOSINOPHIL NFR BLD AUTO: 7 % (ref 0.3–6.2)
ERYTHROCYTE [DISTWIDTH] IN BLOOD BY AUTOMATED COUNT: 13.6 % (ref 12.3–15.4)
FLUAV SUBTYP SPEC NAA+PROBE: NOT DETECTED
FLUBV RNA ISLT QL NAA+PROBE: NOT DETECTED
GEN 5 1HR TROPONIN T REFLEX: <6 NG/L
GLOBULIN UR ELPH-MCNC: 2.9 GM/DL
GLUCOSE SERPL-MCNC: 119 MG/DL (ref 65–99)
HCT VFR BLD AUTO: 51.8 % (ref 34–46.6)
HGB BLD-MCNC: 17.2 G/DL (ref 12–15.9)
HOLD SPECIMEN: NORMAL
HOLD SPECIMEN: NORMAL
IMM GRANULOCYTES # BLD AUTO: 0.02 10*3/MM3 (ref 0–0.05)
IMM GRANULOCYTES NFR BLD AUTO: 0.3 % (ref 0–0.5)
LIPASE SERPL-CCNC: 25 U/L (ref 13–60)
LYMPHOCYTES # BLD AUTO: 3.75 10*3/MM3 (ref 0.7–3.1)
LYMPHOCYTES NFR BLD AUTO: 48.6 % (ref 19.6–45.3)
MAGNESIUM SERPL-MCNC: 1.9 MG/DL (ref 1.6–2.6)
MCH RBC QN AUTO: 30.1 PG (ref 26.6–33)
MCHC RBC AUTO-ENTMCNC: 33.2 G/DL (ref 31.5–35.7)
MCV RBC AUTO: 90.6 FL (ref 79–97)
MONOCYTES # BLD AUTO: 0.62 10*3/MM3 (ref 0.1–0.9)
MONOCYTES NFR BLD AUTO: 8 % (ref 5–12)
NEUTROPHILS NFR BLD AUTO: 2.72 10*3/MM3 (ref 1.7–7)
NEUTROPHILS NFR BLD AUTO: 35.3 % (ref 42.7–76)
NRBC BLD AUTO-RTO: 0 /100 WBC (ref 0–0.2)
NT-PROBNP SERPL-MCNC: <36 PG/ML (ref 0–450)
PLATELET # BLD AUTO: 220 10*3/MM3 (ref 140–450)
PMV BLD AUTO: 9.8 FL (ref 6–12)
POTASSIUM SERPL-SCNC: 3.6 MMOL/L (ref 3.5–5.2)
PROT SERPL-MCNC: 7.2 G/DL (ref 6–8.5)
QT INTERVAL: 342 MS
QTC INTERVAL: 449 MS
RBC # BLD AUTO: 5.72 10*6/MM3 (ref 3.77–5.28)
RSV RNA NPH QL NAA+NON-PROBE: NOT DETECTED
SARS-COV-2 RNA RESP QL NAA+PROBE: NOT DETECTED
SODIUM SERPL-SCNC: 138 MMOL/L (ref 136–145)
TROPONIN T NUMERIC DELTA: NORMAL
TROPONIN T SERPL HS-MCNC: <6 NG/L
WBC NRBC COR # BLD AUTO: 7.71 10*3/MM3 (ref 3.4–10.8)
WHOLE BLOOD HOLD COAG: NORMAL
WHOLE BLOOD HOLD SPECIMEN: NORMAL

## 2024-12-28 PROCEDURE — 93005 ELECTROCARDIOGRAM TRACING: CPT | Performed by: EMERGENCY MEDICINE

## 2024-12-28 PROCEDURE — 94799 UNLISTED PULMONARY SVC/PX: CPT

## 2024-12-28 PROCEDURE — 83735 ASSAY OF MAGNESIUM: CPT | Performed by: EMERGENCY MEDICINE

## 2024-12-28 PROCEDURE — 93005 ELECTROCARDIOGRAM TRACING: CPT

## 2024-12-28 PROCEDURE — 87637 SARSCOV2&INF A&B&RSV AMP PRB: CPT | Performed by: EMERGENCY MEDICINE

## 2024-12-28 PROCEDURE — 36415 COLL VENOUS BLD VENIPUNCTURE: CPT

## 2024-12-28 PROCEDURE — 71045 X-RAY EXAM CHEST 1 VIEW: CPT

## 2024-12-28 PROCEDURE — 85025 COMPLETE CBC W/AUTO DIFF WBC: CPT | Performed by: EMERGENCY MEDICINE

## 2024-12-28 PROCEDURE — 94640 AIRWAY INHALATION TREATMENT: CPT

## 2024-12-28 PROCEDURE — 80053 COMPREHEN METABOLIC PANEL: CPT | Performed by: EMERGENCY MEDICINE

## 2024-12-28 PROCEDURE — 25510000001 IOPAMIDOL PER 1 ML: Performed by: EMERGENCY MEDICINE

## 2024-12-28 PROCEDURE — 83880 ASSAY OF NATRIURETIC PEPTIDE: CPT | Performed by: EMERGENCY MEDICINE

## 2024-12-28 PROCEDURE — 71275 CT ANGIOGRAPHY CHEST: CPT

## 2024-12-28 PROCEDURE — 83690 ASSAY OF LIPASE: CPT | Performed by: EMERGENCY MEDICINE

## 2024-12-28 PROCEDURE — 99285 EMERGENCY DEPT VISIT HI MDM: CPT

## 2024-12-28 PROCEDURE — 84484 ASSAY OF TROPONIN QUANT: CPT | Performed by: EMERGENCY MEDICINE

## 2024-12-28 RX ORDER — ASPIRIN 81 MG/1
324 TABLET, CHEWABLE ORAL ONCE
Status: COMPLETED | OUTPATIENT
Start: 2024-12-28 | End: 2024-12-28

## 2024-12-28 RX ORDER — ALBUTEROL SULFATE 90 UG/1
2 INHALANT RESPIRATORY (INHALATION) EVERY 4 HOURS PRN
Qty: 18 G | Refills: 0 | Status: SHIPPED | OUTPATIENT
Start: 2024-12-28

## 2024-12-28 RX ORDER — SODIUM CHLORIDE 0.9 % (FLUSH) 0.9 %
10 SYRINGE (ML) INJECTION AS NEEDED
Status: DISCONTINUED | OUTPATIENT
Start: 2024-12-28 | End: 2024-12-28 | Stop reason: HOSPADM

## 2024-12-28 RX ORDER — AZITHROMYCIN 250 MG/1
TABLET, FILM COATED ORAL
Qty: 6 TABLET | Refills: 0 | Status: SHIPPED | OUTPATIENT
Start: 2024-12-28

## 2024-12-28 RX ORDER — IPRATROPIUM BROMIDE AND ALBUTEROL SULFATE 2.5; .5 MG/3ML; MG/3ML
3 SOLUTION RESPIRATORY (INHALATION) ONCE
Status: COMPLETED | OUTPATIENT
Start: 2024-12-28 | End: 2024-12-28

## 2024-12-28 RX ORDER — IOPAMIDOL 755 MG/ML
100 INJECTION, SOLUTION INTRAVASCULAR
Status: COMPLETED | OUTPATIENT
Start: 2024-12-28 | End: 2024-12-28

## 2024-12-28 RX ORDER — PREDNISONE 50 MG/1
50 TABLET ORAL DAILY
Qty: 5 TABLET | Refills: 0 | Status: SHIPPED | OUTPATIENT
Start: 2024-12-28

## 2024-12-28 RX ADMIN — IPRATROPIUM BROMIDE AND ALBUTEROL SULFATE 3 ML: .5; 3 SOLUTION RESPIRATORY (INHALATION) at 16:11

## 2024-12-28 RX ADMIN — IOPAMIDOL 100 ML: 755 INJECTION, SOLUTION INTRAVENOUS at 17:59

## 2024-12-28 RX ADMIN — ASPIRIN 324 MG: 81 TABLET, CHEWABLE ORAL at 14:49

## 2024-12-28 NOTE — ED PROVIDER NOTES
Time: 2:32 PM EST  Date of encounter:  2024  Independent Historian/Clinical History and Information was obtained by:   Patient    History is limited by: N/A    Chief Complaint: Short of breath      History of Present Illness:  Patient is a 39 y.o. year old female who presents to the emergency department for evaluation of shortness of breath x 5 days.  Patient has prior medical history consistent for asthma currently not on therapy.  Patient reports that she was seen at [facility] ED a couple days ago for similar symptoms and they completed viral swabs which were negative.  Reports that she is having ongoing shortness of breath despite supportive care recommended.  Denies any chest pain.  Denies any recent antibiotic use.  Currently anticoagulated on Xarelto for clotting disorder. Patient was recently seen at [facility] emergency department and was diagnosed as having a URI.      Patient Care Team  Primary Care Provider: Tricia Esparza MD    Past Medical History:     Allergies   Allergen Reactions    Sumatriptan Shortness Of Breath    Amoxicillin Hives     Hives and blisters    Tylenol [Acetaminophen] Hives     Past Medical History:   Diagnosis Date    Abnormal ECG     Acid reflux     Anemia     Anxiety 2014    Asthma     Bleeding disorder     Cholelithiasis     Clotting disorder     Deep vein thrombosis 2018    Depression     Foot sprain, right, initial encounter 10/21/2015    GERD (gastroesophageal reflux disease)     HTN (hypertension)     gestational    Migraine headache     Pancreatitis 2022    [facility]    Pineal gland cyst 2014    Renal cyst 2014     Past Surgical History:   Procedure Laterality Date     SECTION      CHOLECYSTECTOMY      COLONOSCOPY N/A 2023    Procedure: COLONOSCOPY WITH BIOPSIES;  Surgeon: Feliz Olguin MD;  Location: [facility] ENDOSCOPY;  Service: Gastroenterology;  Laterality: N/A;  NORMAL COLONOSCOPY    ENDOSCOPY N/A 10/22/2021     Procedure: ESOPHAGOGASTRODUODENOSCOPY WITH BIOPSIES;  Surgeon: Feliz Olguin MD;  Location: [facility] ENDOSCOPY;  Service: Gastroenterology;  Laterality: N/A;  NORMAL EGD    ENDOSCOPY N/A 06/02/2023    Procedure: ESOPHAGOGASTRODUODENOSCOPY WITH BIOPSIES;  Surgeon: Feliz Olguin MD;  Location: [facility] ENDOSCOPY;  Service: Gastroenterology;  Laterality: N/A;  HIATAL HERNIA    ENDOSCOPY N/A 3/22/2024    Procedure: ESOPHAGOGASTRODUODENOSCOPY WITH BIOPSIES;  Surgeon: Feliz Olguin MD;  Location: [facility] ENDOSCOPY;  Service: Gastroenterology;  Laterality: N/A;  RETAINED FOOD IN THE STOMACH, EROSIVE GASTRITIS, REFLUX ESOPHAGITIS    LASER ABLATION      TUBAL ABDOMINAL LIGATION  03/27/2012     Family History   Problem Relation Age of Onset    Diabetes Mother     Arthritis Mother     Anxiety disorder Mother     Hyperlipidemia Mother     Liver disease Mother     Stroke Mother     Thyroid disease Mother     Other Father         cardio vascular disease    Diabetes Father     Kidney failure Father     Arthritis Father     Colon cancer Neg Hx        Home Medications:  Prior to Admission medications    Medication Sig Start Date End Date Taking? Authorizing Provider   amitriptyline (ELAVIL) 25 MG tablet Take 1 tablet by mouth Every Night. 9/15/23   Khari Mar MD   amitriptyline (ELAVIL) 50 MG tablet Take 1 tablet by mouth every night at bedtime. 8/16/24   Khari Mar MD   dicyclomine (BENTYL) 20 MG tablet Take 1 tablet by mouth Every 6 (Six) Hours As Needed for Abdominal Cramping. 7/25/24   Radha Amato APRN   fenofibrate (TRICOR) 145 MG tablet Take 1 tablet by mouth. 11/28/23   Khari Mar MD   levETIRAcetam (KEPPRA) 500 MG tablet Take 1 tablet by mouth 2 (Two) Times a Day.    Khari Mar MD   lidocaine (LIDODERM) 5 % Place 1 patch on the skin as directed by provider Daily. Remove & Discard patch within 12 hours or as directed by MD 11/26/24   Shruti Hawkins APRN    metaxalone (SKELAXIN) 800 MG tablet Take 1 tablet by mouth 3 (Three) Times a Day As Needed for Muscle Spasms. 24   Shruti Hawkins APRN   methylPREDNISolone (MEDROL) 4 MG dose pack Take as directed on package instructions. 24   Shruti Hawkins APRN   montelukast (SINGULAIR) 10 MG tablet Take 1 tablet by mouth Every Night.    Khari Mar MD   naproxen (NAPROSYN) 500 MG tablet Take 1 tablet by mouth. 24   Khair Mar MD   ondansetron ODT (ZOFRAN-ODT) 4 MG disintegrating tablet Place 1 tablet on the tongue 4 (Four) Times a Day As Needed for Nausea or Vomiting. 24   Radha Amato APRN   ondansetron ODT (ZOFRAN-ODT) 4 MG disintegrating tablet Place 1 tablet on the tongue Every 6 (Six) Hours As Needed for Nausea or Vomiting. 24   Ramon Conner MD   Pancrelipase, Lip-Prot-Amyl, (Creon) 63432-404372 units capsule delayed-release particles capsule Take 1 capsule by mouth Take As Directed. Take 2 capsule with every meal and 1 capsule with snacks 10/20/23   Thao Valles APRN   pantoprazole (PROTONIX) 20 MG EC tablet Take 1 tablet by mouth Daily. 24   Seaver, Alyce B, APRN   propranolol (INDERAL) 60 MG tablet Take 1 tablet by mouth 2 (Two) Times a Day. 9/15/23   Khari Mar MD   rivaroxaban (XARELTO) 20 MG tablet Take 1 tablet by mouth Daily.    Khari Mar MD   sertraline (ZOLOFT) 50 MG tablet Take 1 tablet by mouth Daily. 1/3/23   Khari Mar MD   vitamin D (ERGOCALCIFEROL) 1.25 MG (18297 UT) capsule capsule Take 1 capsule by mouth 1 (One) Time Per Week. Monday    Khari Mar MD        Social History:   Social History     Tobacco Use    Smoking status: Former     Current packs/day: 0.00     Average packs/day: 1 pack/day for 15.0 years (15.0 ttl pk-yrs)     Types: Cigarettes     Start date: 2007     Quit date: 2022     Years since quittin.1     Passive exposure: Past    Smokeless tobacco: Never   Vaping Use  "   Vaping status: Never Used   Substance Use Topics    Alcohol use: Never    Drug use: Never         Review of Systems:  Review of Systems   HENT:  Negative for rhinorrhea, sinus pressure, sinus pain and sore throat.    Respiratory:  Positive for cough, shortness of breath and wheezing.         Physical Exam:  /71   Pulse 81   Temp 97.8 °F (36.6 °C) (Oral)   Resp 22   Ht 157.5 cm (62\")   Wt 99.9 kg (220 lb 3.8 oz)   SpO2 90%   BMI 40.28 kg/m²     Physical Exam  Vitals and nursing note reviewed.   Constitutional:       General: She is not in acute distress.  HENT:      Head: Normocephalic and atraumatic.   Eyes:      Extraocular Movements: Extraocular movements intact.   Cardiovascular:      Rate and Rhythm: Normal rate and regular rhythm.      Heart sounds: Normal heart sounds.   Pulmonary:      Effort: Pulmonary effort is normal.      Breath sounds: Examination of the right-upper field reveals decreased breath sounds. Examination of the left-upper field reveals decreased breath sounds. Decreased breath sounds present.   Abdominal:      General: Abdomen is flat.      Palpations: Abdomen is soft.      Tenderness: There is no abdominal tenderness.   Musculoskeletal:         General: Normal range of motion.      Cervical back: Normal range of motion and neck supple.   Skin:     General: Skin is warm and dry.   Neurological:      Mental Status: She is alert and oriented to person, place, and time. Mental status is at baseline.                    Medical Decision Making:      Comorbidities that affect care:    History of anemia, bleeding/clotting disorder, history of DVT    External Notes reviewed:    Previous ED Note: Patient seen in this ER on 11/26/2024 for acute right shoulder pain      The following orders were placed and all results were independently analyzed by me:  Orders Placed This Encounter   Procedures    COVID-19, FLU A/B, RSV PCR 1 HR TAT - Swab, Nasopharynx    XR Chest 1 View    CT Angiogram " Chest Pulmonary Embolism    [place] Draw    High Sensitivity Troponin T    Comprehensive Metabolic Panel    Lipase    BNP    Magnesium    CBC Auto Differential    High Sensitivity Troponin T 1Hr    NPO Diet NPO Type: Strict NPO    Undress & Gown    Continuous Pulse Oximetry    Oxygen Therapy- Nasal Cannula; Titrate 1-6 LPM Per SpO2; 90 - 95%    ECG 12 Lead ED Triage Standing Order; Chest Pain    ECG 12 Lead ED Triage Standing Order; Chest Pain    Insert Peripheral IV    CBC & Differential    Green Top (Gel)    Lavender Top    Gold Top - SST    Light Blue Top       Medications Given in the Emergency Department:  Medications   sodium chloride 0.9 % flush 10 mL (has no administration in time range)   aspirin chewable tablet 324 mg (324 mg Oral Given 12/28/24 1449)   ipratropium-albuterol (DUO-NEB) nebulizer solution 3 mL (3 mL Nebulization Given 12/28/24 1611)   iopamidol (ISOVUE-370) 76 % injection 100 mL (100 mL Intravenous Given 12/28/24 1759)        ED Course:    ED Course as of 12/28/24 2111   Sat Dec 28, 2024   1422 EKG:    Rhythm: Sinus tachycardia  Rate: 103  Intervals: Normal  T-wave: Normal  ST Segment: Nonspecific changes    EKG Comparison: Not available    Interpreted by me   [NL]   1434 --- PROVIDER IN TRIAGE NOTE ---    The patient was evaluated by Mio rai in triage. Orders were placed and the patient is currently awaiting disposition.  [CB]      ED Course User Index  [CB] Mio Canela PA-C  [NL] Mj Lala DO       Labs:    Lab Results (last 24 hours)       Procedure Component Value Units Date/Time    COVID-19, FLU A/B, RSV PCR 1 HR TAT - Swab, Nasopharynx [[id number]]  (Normal) Collected: 12/28/24 1434    Specimen: Swab from Nasopharynx Updated: 12/28/24 1526     COVID19 Not Detected     Influenza A PCR Not Detected     Influenza B PCR Not Detected     RSV, PCR Not Detected    Narrative:      Fact sheet for providers: https://www.fda.gov/media/775284/download    Fact sheet for  patients: https://www.[employer].gov/media/051847/download    Test performed by PCR.    High Sensitivity Troponin T [[id number]]  (Normal) Collected: 12/28/24 1510    Specimen: Blood Updated: 12/28/24 1538     HS Troponin T <6 ng/L     Narrative:      High Sensitive Troponin T Reference Range:  <14.0 ng/L- Negative Female for AMI  <22.0 ng/L- Negative Male for AMI  >=14 - Abnormal Female indicating possible myocardial injury.  >=22 - Abnormal Male indicating possible myocardial injury.   Clinicians would have to utilize clinical acumen, EKG, Troponin, and serial changes to determine if it is an Acute Myocardial Infarction or myocardial injury due to an underlying chronic condition.         CBC & Differential [[id number]]  (Abnormal) Collected: 12/28/24 1510    Specimen: Blood Updated: 12/28/24 1519    Narrative:      The following orders were created for panel order CBC & Differential.  Procedure                               Abnormality         Status                     ---------                               -----------         ------                     CBC Auto Differential[[id number]]        Abnormal            Final result                 Please view results for these tests on the individual orders.    Comprehensive Metabolic Panel [[id number]]  (Abnormal) Collected: 12/28/24 1510    Specimen: Blood Updated: 12/28/24 1554     Glucose 119 mg/dL      BUN 7 mg/dL      Creatinine 0.63 mg/dL      Sodium 138 mmol/L      Potassium 3.6 mmol/L      Comment: Slight hemolysis detected by analyzer. Result may be falsely elevated.        Chloride 103 mmol/L      CO2 23.1 mmol/L      Calcium 9.4 mg/dL      Total Protein 7.2 g/dL      Albumin 4.3 g/dL      ALT (SGPT) 98 U/L      AST (SGOT) 79 U/L      Alkaline Phosphatase 87 U/L      Total Bilirubin 0.7 mg/dL      Globulin 2.9 gm/dL      A/G Ratio 1.5 g/dL      BUN/Creatinine Ratio 11.1     Anion Gap 11.9 mmol/L      eGFR 115.2 mL/min/1.73     Narrative:      GFR Categories in Chronic  Kidney Disease (CKD)      GFR Category          GFR (mL/min/1.73)    Interpretation  G1                     90 or greater         Normal or high (1)  G2                      60-89                Mild decrease (1)  G3a                   45-59                Mild to moderate decrease  G3b                   30-44                Moderate to severe decrease  G4                    15-29                Severe decrease  G5                    14 or less           Kidney failure          (1)In the absence of evidence of kidney disease, neither GFR category G1 or G2 fulfill the criteria for CKD.    eGFR calculation 2021 CKD-EPI creatinine equation, which does not include race as a factor    Lipase [[id number]]  (Normal) Collected: 12/28/24 1510    Specimen: Blood Updated: 12/28/24 1554     Lipase 25 U/L     BNP [[id number]]  (Normal) Collected: 12/28/24 1510    Specimen: Blood Updated: 12/28/24 1537     proBNP <36.0 pg/mL     Narrative:      This assay is used as an aid in the diagnosis of individuals suspected of having heart failure. It can be used as an aid in the diagnosis of acute decompensated heart failure (ADHF) in patients presenting with signs and symptoms of ADHF to the emergency department (ED). In addition, NT-proBNP of <300 pg/mL indicates ADHF is not likely.    Age Range Result Interpretation  NT-proBNP Concentration (pg/mL:      <50             Positive            >450                   Gray                 300-450                    Negative             <300    50-75           Positive            >900                  Gray                300-900                  Negative            <300      >75             Positive            >1800                  Gray                300-1800                  Negative            <300    Magnesium [[id number]]  (Normal) Collected: 12/28/24 1510    Specimen: Blood Updated: 12/28/24 1554     Magnesium 1.9 mg/dL     CBC Auto Differential [[id number]]  (Abnormal) Collected: 12/28/24  1510    Specimen: Blood Updated: 12/28/24 1519     WBC 7.71 10*3/mm3      RBC 5.72 10*6/mm3      Hemoglobin 17.2 g/dL      Hematocrit 51.8 %      MCV 90.6 fL      MCH 30.1 pg      MCHC 33.2 g/dL      RDW 13.6 %      RDW-SD 45.6 fl      MPV 9.8 fL      Platelets 220 10*3/mm3      Neutrophil % 35.3 %      Lymphocyte % 48.6 %      Monocyte % 8.0 %      Eosinophil % 7.0 %      Basophil % 0.8 %      Immature Grans % 0.3 %      Neutrophils, Absolute 2.72 10*3/mm3      Lymphocytes, Absolute 3.75 10*3/mm3      Monocytes, Absolute 0.62 10*3/mm3      Eosinophils, Absolute 0.54 10*3/mm3      Basophils, Absolute 0.06 10*3/mm3      Immature Grans, Absolute 0.02 10*3/mm3      nRBC 0.0 /100 WBC     High Sensitivity Troponin T 1Hr [[id number]] Collected: 12/28/24 1641    Specimen: Blood from Arm, Left Updated: 12/28/24 1713     HS Troponin T <6 ng/L      Troponin T Numeric Delta --     Comment: Unable to calculate.       Narrative:      High Sensitive Troponin T Reference Range:  <14.0 ng/L- Negative Female for AMI  <22.0 ng/L- Negative Male for AMI  >=14 - Abnormal Female indicating possible myocardial injury.  >=22 - Abnormal Male indicating possible myocardial injury.   Clinicians would have to utilize clinical acumen, EKG, Troponin, and serial changes to determine if it is an Acute Myocardial Infarction or myocardial injury due to an underlying chronic condition.                  Imaging:    CT Angiogram Chest Pulmonary Embolism    Result Date: 12/28/2024  CT ANGIOGRAM CHEST PULMONARY EMBOLISM Date of Exam: 12/28/2024 5:50 PM EST Indication: sob/tachy. Comparison: None available. Technique: Axial CT images were obtained of the chest after the uneventful intravenous administration of iodinated contrast utilizing pulmonary embolism protocol.  Reconstructed coronal and sagittal images were also obtained. Automated exposure control and iterative construction methods were used. Findings: Mild multifocal areas of air trapping. The  thyroid gland is normal. The subglottic airway is clear. No evidence of aortic dissection. No pulmonary embolus. Bronchial wall thickening suggesting bronchitis. The thyroid gland is normal. The subglottic airway is clear. Shotty mediastinal lymph nodes. 1.6 cm x 1.1 cm right axillary lymph node. 2.6 cm x 0.8 cm left axillary lymph node. 2.2 cm x 1.2 cm right hilar lymph node. Coronary vessels are normal. No pericardial effusion. The visualized upper abdomen is normal. Thoracic vertebral body height and alignment are normal. Benign hemangioma of T8. The posterior elements are intact. The sternum is intact. The clavicles are intact. No rib fractures.     Areas of air trapping with associated bronchial wall thickening consistent with bronchitis versus reactive airway disease. No pulmonary embolus. Electronically Signed: Darrell Beltrán MD  12/28/2024 6:15 PM EST  Workstation ID: [id number]    XR Chest 1 View    Result Date: 12/28/2024  XR CHEST 1 VW Date of Exam: 12/28/2024 2:45 PM EST Indication: Chest Pain Triage Protocol Comparison: CXR 3/8/2024 Findings: The heart is normal in size. The lungs are well-expanded and free of infiltrates. Bony structures appear intact.     Impression: No active disease is seen. Electronically Signed: Fabian Healy MD  12/28/2024 2:50 PM EST  Workstation ID: [id number]       Differential Diagnosis and Discussion:    Dyspnea: Differential diagnosis includes but is not limited to metabolic acidosis, neurological disorders, psychogenic, asthma, pneumothorax, upper airway obstruction, COPD, pneumonia, noncardiogenic pulmonary edema, interstitial lung disease, anemia, congestive heart failure, and pulmonary embolism    PROCEDURES:    Labs were collected in the emergency department and all labs were reviewed and interpreted by me.  X-ray were performed in the emergency department and all X-ray impressions were independently interpreted by me.  An EKG was performed and the EKG was interpreted by  me.  CT scan was performed in the emergency department and the CT scan radiology impression was interpreted by me.    ECG 12 Lead ED Triage Standing Order; Chest Pain   Preliminary Result   HEART YAUQ=830  bpm   RR Btudeopb=335  ms   [place] Kyhyeqnn=163  ms   P Horizontal Axis=-5  deg   P Front Axis=73  deg   QRSD Interval=83  ms   QT Lhzacago=195  ms   CYqO=065  ms   QRS Axis=25  deg   T Wave Axis=50  deg   - OTHERWISE NORMAL ECG -   Sinus tachycardia   Low voltage, extremity leads   Date and Time of Study:2024-12-28 14:22:52          Procedures    MDM     The patient presented with a productive cough. The patient is well-appearing and in no respiratory distress. The patient has a normal mental status and is neurologically intact. The patient appears well and is able to tolerate food for fluid by mouth and there's no significant dehydration. There is no respiratory distress and no signs of systemic toxicity. The history, exam, diagnostic testing and current condition do not demonstrate an infectious process such as meningitis, severe pneumonia, retropharyngeal abscess, epiglottitis, sepsis or other serious bacterial infection requiring further testing, treatment, consultation, or admission at this time. The patient has no additional oxygen requirement nor are there any signs of respiratory distress. The patient has a chest x-ray interpreted by me that is negative for pneumonia. Asthma, URI, GERD are also considered. The vital signs have been stable and the patient has had no hypoxia. The patient's condition is stable and appropriate for discharge.               Patient Care Considerations:    SEPSIS was considered but is NOT present in the emergency department as SIRS criteria is not present.      Consultants/Shared Management Plan:    None    Social Determinants of Health:    Patient is independent, reliable, and has access to care.       Disposition and Care Coordination:    Discharged: The patient is suitable and  stable for discharge with no need for consideration of admission.    I have explained the patient´s condition, diagnoses and treatment plan based on the information available to me at this time. I have answered questions and addressed any concerns. The patient has a good  understanding of the patient´s diagnosis, condition, and treatment plan as can be expected at this point. The vital signs have been stable. The patient´s condition is stable and appropriate for discharge from the emergency department.      The patient will pursue further outpatient evaluation with the primary care physician or other designated or consulting physician as outlined in the discharge instructions. They are agreeable to this plan of care and follow-up instructions have been explained in detail. The patient has received these instructions in written format and has expressed an understanding of the discharge instructions. The patient is aware that any significant change in condition or worsening of symptoms should prompt an immediate return to this or the closest emergency department or call to 911.  I have explained discharge medications and the need for follow up with the patient/caretakers. This was also printed in the discharge instructions. Patient was discharged with the following medications and follow up:      Medication List        New Prescriptions      albuterol sulfate  (90 Base) MCG/ACT inhaler  Commonly known as: PROVENTIL HFA;VENTOLIN HFA;PROAIR HFA  Inhale 2 puffs Every 4 (Four) Hours As Needed for Wheezing.     azithromycin 250 MG tablet  Commonly known as: Zithromax Z-Jorge  Take 2 tablets by mouth on day 1, then 1 tablet daily on days 2-5     predniSONE 50 MG tablet  Commonly known as: DELTASONE  Take 1 tablet by mouth Daily.               Where to Get Your Medications        These medications were sent to [facility] #[id number] - [place] - [address] - [phone]  - [phone]   [address]  [place] [address]      Hours: 24-hours Phone: [phone]   albuterol sulfate  (90 Base) MCG/ACT inhaler  azithromycin 250 MG tablet  predniSONE 50 MG tablet      No follow-up provider specified.     Final diagnoses:   Acute bronchitis, unspecified organism   Bronchospasm        ED Disposition       ED Disposition   Discharge    Condition   Stable    Comment   --               This medical record created using voice recognition software.             Mj Lala DO  12/28/24 [id number]

## 2025-01-20 LAB
QT INTERVAL: 342 MS
QTC INTERVAL: 449 MS

## 2025-03-18 ENCOUNTER — OFFICE VISIT (OUTPATIENT)
Dept: ORTHOPEDIC SURGERY | Facility: CLINIC | Age: 40
End: 2025-03-18
Payer: MEDICARE

## 2025-03-18 VITALS
HEIGHT: 62 IN | HEART RATE: 114 BPM | SYSTOLIC BLOOD PRESSURE: 160 MMHG | OXYGEN SATURATION: 95 % | WEIGHT: 219 LBS | DIASTOLIC BLOOD PRESSURE: 91 MMHG | BODY MASS INDEX: 40.3 KG/M2

## 2025-03-18 DIAGNOSIS — S99.912A LEFT ANKLE INJURY, INITIAL ENCOUNTER: Primary | ICD-10-CM

## 2025-03-18 DIAGNOSIS — S82.62XA CLOSED FRACTURE OF DISTAL LATERAL MALLEOLUS OF LEFT FIBULA, INITIAL ENCOUNTER: ICD-10-CM

## 2025-03-18 RX ORDER — UBROGEPANT 100 MG/1
TABLET ORAL
COMMUNITY
Start: 2025-03-14

## 2025-03-18 NOTE — PROGRESS NOTES
"Chief Complaint  Initial Evaluation of the Left Ankle     Subjective      Tram García presents to [facility] for initial evaluation of the left ankle.  She has pain in the left ankle.  She has swelling.  She was putting clothes away and stepped the wrong way and came to  and was noted to say it was a left ankle sprain.  That was 1/3/25.  She then went to PCP and had another X ray and is here to review.      Allergies   Allergen Reactions    Sumatriptan Shortness Of Breath    Amoxicillin Hives     Hives and blisters    Tylenol [Acetaminophen] Hives        Social History     Socioeconomic History    Marital status: Single   Tobacco Use    Smoking status: Former     Current packs/day: 0.00     Average packs/day: 1 pack/day for 15.0 years (15.0 ttl pk-yrs)     Types: Cigarettes     Start date: 2007     Quit date: 2022     Years since quittin.3     Passive exposure: Past    Smokeless tobacco: Never   Vaping Use    Vaping status: Never Used   Substance and Sexual Activity    Alcohol use: Never    Drug use: Never    Sexual activity: Yes     Partners: Female     Birth control/protection: None        I reviewed the patient's chief complaint, history of present illness, review of systems, past medical history, surgical history, family history, social history, medications, and allergy list.     Review of Systems     Constitutional: Denies fevers, chills, weight loss  Cardiovascular: Denies chest pain, shortness of breath  Skin: Denies rashes, acute skin changes  Neurologic: Denies headache, loss of consciousness        Vital Signs:   /91   Pulse 114   Ht 157.5 cm (62\")   Wt 99.3 kg (219 lb)   SpO2 95%   BMI 40.06 kg/m²          Physical Exam  General: Alert. No acute distress    Ortho Exam        LEFT ANKLE Positive EHL, FHL, GS and TA. Sensation intact to all 5 nerves of the foot. Positive pulses. Neurovascularly intact. Calf soft, Non-tender. Limited ROM of the left " ankle.  Stable to stress. Tender to the medial aspect of the ankle. Intact flexion and extension of toes.  She can wiggle her toes.  She has swelling of the ankle.       Procedures      Imaging Results (Most Recent)       None             Result Review :     3/12/25 X ray [facility]  PROCEDURE: AP and lateral  REASON FOR EXAM: Left ankle pain since injuring it 1 month ago  COMPARISON: None  TECHNIQUE: AP, oblique and lateral views of the left ankle were obtained.  FINDINGS: There is a lucent band transversely across the medial malleolus that could represent nondisplaced medial malleolar  fracture. The lateral malleolus is unremarkable. The dome of the talus is intact and the ankle mortise is symmetric. Small heel spurs  are noted. No evidence of foreign body. No significant degenerative changes.  IMPRESSION: Nondisplaced transverse fracture of the medial malleolus. Small heel spurs          Assessment and Plan     Diagnoses and all orders for this visit:    1. Left ankle injury, initial encounter (Primary)    2. Closed fracture of the medial malleolus of left fibula, initial encounter        Discussed the treatment plan with the patient. I reviewed the X-rays that were obtained 3/12/25 with the patient.     CAM boot given.  Prescribed physical therapy.      Will obtain X-Rays of the left ankle at next visit.    Call or return if worsening symptoms.    Follow Up     3-4 weeks to assess ROM and swelling of the left ankle.        Patient was given instructions and counseling regarding her condition or for health maintenance advice. Please see specific information pulled into the AVS if appropriate.     Scribed for Mana Hendricks MD by Porsche Arroyo MA.  03/18/25   13:05 EDT      I have personally performed the services described in this document as scribed by the above individual and it is both accurate and complete. Mana Hendricks MD 03/18/25

## 2025-04-27 ENCOUNTER — APPOINTMENT (OUTPATIENT)
Dept: GENERAL RADIOLOGY | Facility: HOSPITAL | Age: 40
End: 2025-04-27
Payer: MEDICARE

## 2025-04-27 ENCOUNTER — HOSPITAL ENCOUNTER (EMERGENCY)
Facility: HOSPITAL | Age: 40
Discharge: HOME | End: 2025-04-27
Attending: EMERGENCY MEDICINE | Admitting: EMERGENCY MEDICINE
Payer: MEDICARE

## 2025-04-27 VITALS
HEART RATE: 94 BPM | OXYGEN SATURATION: 91 % | HEIGHT: 62 IN | SYSTOLIC BLOOD PRESSURE: 128 MMHG | DIASTOLIC BLOOD PRESSURE: 79 MMHG | TEMPERATURE: 98.3 F | RESPIRATION RATE: 20 BRPM | BODY MASS INDEX: 41.71 KG/M2 | WEIGHT: 226.63 LBS

## 2025-04-27 DIAGNOSIS — J20.9 ACUTE BRONCHITIS, UNSPECIFIED ORGANISM: Primary | ICD-10-CM

## 2025-04-27 LAB
ALBUMIN SERPL-MCNC: 4.1 G/DL (ref 3.5–5.2)
ALBUMIN/GLOB SERPL: 1.3 G/DL
ALP SERPL-CCNC: 91 U/L (ref 39–117)
ALT SERPL W P-5'-P-CCNC: 71 U/L (ref 1–33)
ANION GAP SERPL CALCULATED.3IONS-SCNC: 11.6 MMOL/L (ref 5–15)
AST SERPL-CCNC: 65 U/L (ref 1–32)
BASOPHILS # BLD AUTO: 0.09 10*3/MM3 (ref 0–0.2)
BASOPHILS NFR BLD AUTO: 1 % (ref 0–1.5)
BILIRUB SERPL-MCNC: 0.6 MG/DL (ref 0–1.2)
BUN SERPL-MCNC: 7 MG/DL (ref 6–20)
BUN/CREAT SERPL: 9.2 (ref 7–25)
CALCIUM SPEC-SCNC: 9.4 MG/DL (ref 8.6–10.5)
CHLORIDE SERPL-SCNC: 104 MMOL/L (ref 98–107)
CO2 SERPL-SCNC: 22.4 MMOL/L (ref 22–29)
CREAT SERPL-MCNC: 0.76 MG/DL (ref 0.57–1)
DEPRECATED RDW RBC AUTO: 49.2 FL (ref 37–54)
EGFRCR SERPLBLD CKD-EPI 2021: 101.7 ML/MIN/1.73
EOSINOPHIL # BLD AUTO: 0.83 10*3/MM3 (ref 0–0.4)
EOSINOPHIL NFR BLD AUTO: 9.6 % (ref 0.3–6.2)
ERYTHROCYTE [DISTWIDTH] IN BLOOD BY AUTOMATED COUNT: 14 % (ref 12.3–15.4)
FLUAV RNA RESP QL NAA+PROBE: NOT DETECTED
FLUBV RNA RESP QL NAA+PROBE: NOT DETECTED
GLOBULIN UR ELPH-MCNC: 3.2 GM/DL
GLUCOSE SERPL-MCNC: 99 MG/DL (ref 65–99)
HCT VFR BLD AUTO: 55.2 % (ref 34–46.6)
HGB BLD-MCNC: 17.7 G/DL (ref 12–15.9)
HOLD SPECIMEN: NORMAL
HOLD SPECIMEN: NORMAL
IMM GRANULOCYTES # BLD AUTO: 0.02 10*3/MM3 (ref 0–0.05)
IMM GRANULOCYTES NFR BLD AUTO: 0.2 % (ref 0–0.5)
LIPASE SERPL-CCNC: 33 U/L (ref 13–60)
LYMPHOCYTES # BLD AUTO: 4.22 10*3/MM3 (ref 0.7–3.1)
LYMPHOCYTES NFR BLD AUTO: 49 % (ref 19.6–45.3)
MAGNESIUM SERPL-MCNC: 1.9 MG/DL (ref 1.6–2.6)
MCH RBC QN AUTO: 30.5 PG (ref 26.6–33)
MCHC RBC AUTO-ENTMCNC: 32.1 G/DL (ref 31.5–35.7)
MCV RBC AUTO: 95 FL (ref 79–97)
MONOCYTES # BLD AUTO: 0.9 10*3/MM3 (ref 0.1–0.9)
MONOCYTES NFR BLD AUTO: 10.5 % (ref 5–12)
NEUTROPHILS NFR BLD AUTO: 2.55 10*3/MM3 (ref 1.7–7)
NEUTROPHILS NFR BLD AUTO: 29.7 % (ref 42.7–76)
NRBC BLD AUTO-RTO: 0 /100 WBC (ref 0–0.2)
NT-PROBNP SERPL-MCNC: <36 PG/ML (ref 0–450)
PLATELET # BLD AUTO: 188 10*3/MM3 (ref 140–450)
PMV BLD AUTO: 10.2 FL (ref 6–12)
POTASSIUM SERPL-SCNC: 4 MMOL/L (ref 3.5–5.2)
PROT SERPL-MCNC: 7.3 G/DL (ref 6–8.5)
RBC # BLD AUTO: 5.81 10*6/MM3 (ref 3.77–5.28)
RSV RNA RESP QL NAA+PROBE: NOT DETECTED
SARS-COV-2 RNA RESP QL NAA+PROBE: NOT DETECTED
SODIUM SERPL-SCNC: 138 MMOL/L (ref 136–145)
TROPONIN T SERPL HS-MCNC: <6 NG/L
WBC NRBC COR # BLD AUTO: 8.61 10*3/MM3 (ref 3.4–10.8)
WHOLE BLOOD HOLD COAG: NORMAL
WHOLE BLOOD HOLD SPECIMEN: NORMAL

## 2025-04-27 PROCEDURE — 83880 ASSAY OF NATRIURETIC PEPTIDE: CPT | Performed by: EMERGENCY MEDICINE

## 2025-04-27 PROCEDURE — 71045 X-RAY EXAM CHEST 1 VIEW: CPT

## 2025-04-27 PROCEDURE — 94799 UNLISTED PULMONARY SVC/PX: CPT

## 2025-04-27 PROCEDURE — 36415 COLL VENOUS BLD VENIPUNCTURE: CPT

## 2025-04-27 PROCEDURE — 94664 DEMO&/EVAL PT USE INHALER: CPT

## 2025-04-27 PROCEDURE — 83735 ASSAY OF MAGNESIUM: CPT | Performed by: EMERGENCY MEDICINE

## 2025-04-27 PROCEDURE — 94640 AIRWAY INHALATION TREATMENT: CPT

## 2025-04-27 PROCEDURE — 93005 ELECTROCARDIOGRAM TRACING: CPT | Performed by: EMERGENCY MEDICINE

## 2025-04-27 PROCEDURE — 85025 COMPLETE CBC W/AUTO DIFF WBC: CPT | Performed by: EMERGENCY MEDICINE

## 2025-04-27 PROCEDURE — 87637 SARSCOV2&INF A&B&RSV AMP PRB: CPT

## 2025-04-27 PROCEDURE — 99284 EMERGENCY DEPT VISIT MOD MDM: CPT

## 2025-04-27 PROCEDURE — 83690 ASSAY OF LIPASE: CPT | Performed by: EMERGENCY MEDICINE

## 2025-04-27 PROCEDURE — 84484 ASSAY OF TROPONIN QUANT: CPT | Performed by: EMERGENCY MEDICINE

## 2025-04-27 PROCEDURE — 80053 COMPREHEN METABOLIC PANEL: CPT | Performed by: EMERGENCY MEDICINE

## 2025-04-27 RX ORDER — DOXYCYCLINE 100 MG/1
100 CAPSULE ORAL 2 TIMES DAILY
Qty: 14 CAPSULE | Refills: 0 | Status: SHIPPED | OUTPATIENT
Start: 2025-04-27 | End: 2025-05-04

## 2025-04-27 RX ORDER — SODIUM CHLORIDE 0.9 % (FLUSH) 0.9 %
10 SYRINGE (ML) INJECTION AS NEEDED
Status: DISCONTINUED | OUTPATIENT
Start: 2025-04-27 | End: 2025-04-28 | Stop reason: HOSPADM

## 2025-04-27 RX ORDER — ALBUTEROL SULFATE 0.83 MG/ML
2.5 SOLUTION RESPIRATORY (INHALATION) ONCE
Status: COMPLETED | OUTPATIENT
Start: 2025-04-27 | End: 2025-04-27

## 2025-04-27 RX ORDER — ASPIRIN 81 MG/1
324 TABLET, CHEWABLE ORAL ONCE
Status: DISCONTINUED | OUTPATIENT
Start: 2025-04-27 | End: 2025-04-27

## 2025-04-27 RX ORDER — METHYLPREDNISOLONE 4 MG/1
TABLET ORAL
Qty: 21 TABLET | Refills: 0 | Status: SHIPPED | OUTPATIENT
Start: 2025-04-27 | End: 2025-05-03

## 2025-04-27 RX ADMIN — ALBUTEROL SULFATE 2.5 MG: 2.5 SOLUTION RESPIRATORY (INHALATION) at 22:12

## 2025-04-28 NOTE — DISCHARGE INSTRUCTIONS
Your labs and chest x-ray completed in the emergency department today look well.  Continue your breathing treatments at home.  Return for new or worsening chest pain, shortness of breath, fever, syncope, other symptoms concerning to you.

## 2025-04-28 NOTE — ED PROVIDER NOTES
Time: 8:59 PM EDT  Date of encounter:  4/27/2025  Independent Historian/Clinical History and Information was obtained by:   Patient    History is limited by: N/A    Chief Complaint: Dyspnea      History of Present Illness:  Patient is a 39 y.o. year old female who presents to the emergency department for evaluation of dyspnea.  Patient presents to the emergency department today for shortness of breath that has been ongoing since Thursday, 4 days ago.  She states that she was seen at [facility] emergency department the day that started and had viral testing and strep testing that were negative.  She states that she was prescribed an antibiotic but she is unsure what it is, based on patient's history this is likely azithromycin.  She states she has been taking it but does not feel improved.  She is concerned that she may have pneumonia.  She has a cough that is nonproductive.  States the dyspnea is worse with exertion.  States that she is having some chest pressure and tightness that is constant.  Denies nausea, vomiting, diarrhea, documented fever but does state that she has had some chills.  Patient is not a smoker and does not vape.  Denies leg swelling.      Patient Care Team  Primary Care Provider: Tricia Esparza MD    Past Medical History:     Allergies   Allergen Reactions    Sumatriptan Shortness Of Breath    Amoxicillin Hives     Hives and blisters    Tylenol [Acetaminophen] Hives     Past Medical History:   Diagnosis Date    Abnormal ECG     Acid reflux     Anemia     Anxiety 02/17/2014    Asthma     Bleeding disorder     Cholelithiasis     Clotting disorder     Deep vein thrombosis 2018    Depression     Foot sprain, right, initial encounter 10/21/2015    GERD (gastroesophageal reflux disease)     HTN (hypertension)     gestational    Migraine headache     Pancreatitis 11/2022    [facility]    Pineal gland cyst 02/21/2014    Renal cyst 02/21/2014     Past Surgical History:   Procedure Laterality  Date     SECTION      CHOLECYSTECTOMY      COLONOSCOPY N/A 2023    Procedure: COLONOSCOPY WITH BIOPSIES;  Surgeon: Feliz Olguin MD;  Location: [facility] ENDOSCOPY;  Service: Gastroenterology;  Laterality: N/A;  NORMAL COLONOSCOPY    ENDOSCOPY N/A 10/22/2021    Procedure: ESOPHAGOGASTRODUODENOSCOPY WITH BIOPSIES;  Surgeon: Feliz Olguin MD;  Location: [facility] ENDOSCOPY;  Service: Gastroenterology;  Laterality: N/A;  NORMAL EGD    ENDOSCOPY N/A 2023    Procedure: ESOPHAGOGASTRODUODENOSCOPY WITH BIOPSIES;  Surgeon: Feliz Olguin MD;  Location: [facility] ENDOSCOPY;  Service: Gastroenterology;  Laterality: N/A;  HIATAL HERNIA    ENDOSCOPY N/A 3/22/2024    Procedure: ESOPHAGOGASTRODUODENOSCOPY WITH BIOPSIES;  Surgeon: Feliz Olguin MD;  Location: [facility] ENDOSCOPY;  Service: Gastroenterology;  Laterality: N/A;  RETAINED FOOD IN THE STOMACH, EROSIVE GASTRITIS, REFLUX ESOPHAGITIS    LASER ABLATION      TUBAL ABDOMINAL LIGATION  2012     Family History   Problem Relation Age of Onset    Diabetes Mother     Arthritis Mother     Anxiety disorder Mother     Hyperlipidemia Mother     Liver disease Mother     Stroke Mother     Thyroid disease Mother     Other Father         cardio vascular disease    Diabetes Father     Kidney failure Father     Arthritis Father     Colon cancer Neg Hx        Home Medications:  Prior to Admission medications    Medication Sig Start Date End Date Taking? Authorizing Provider   albuterol sulfate  (90 Base) MCG/ACT inhaler Inhale 2 puffs Every 4 (Four) Hours As Needed for Wheezing. 24   Mj Lala,    amitriptyline (ELAVIL) 25 MG tablet Take 1 tablet by mouth Every Night. 9/15/23   Khari Mar MD   amitriptyline (ELAVIL) 50 MG tablet Take 1 tablet by mouth every night at bedtime. 24   Khari Mar MD   fenofibrate (TRICOR) 145 MG tablet Take 1 tablet by mouth. 23   Khari Mar MD   levETIRAcetam  (KEPPRA) 500 MG tablet Take 1 tablet by mouth 2 (Two) Times a Day.    Khari Mar MD   montelukast (SINGULAIR) 10 MG tablet Take 1 tablet by mouth Every Night.    Khari Mar MD   naproxen (NAPROSYN) 500 MG tablet Take 1 tablet by mouth. 24   Khari Mar MD   Pancrelipase, Lip-Prot-Amyl, (Creon) 24897-881296 units capsule delayed-release particles capsule Take 1 capsule by mouth Take As Directed. Take 2 capsule with every meal and 1 capsule with snacks 10/20/23   Thao Valles APRN   pantoprazole (PROTONIX) 20 MG EC tablet Take 1 tablet by mouth Daily. 24   Seaver, Alyce B, APRN   propranolol (INDERAL) 60 MG tablet Take 1 tablet by mouth 2 (Two) Times a Day. 9/15/23   Khari Mar MD   rivaroxaban (XARELTO) 20 MG tablet Take 1 tablet by mouth Daily.    Khari Mar MD   sertraline (ZOLOFT) 50 MG tablet Take 1 tablet by mouth Daily. 1/3/23   Khari Mar MD   Ubrelvy 100 MG tablet Take 1 tablet by mouth at onset of migraine, may repeat in 2 hours if no relief, max of 2 per day 3/14/25   Khari Mar MD   vitamin D (ERGOCALCIFEROL) 1.25 MG (34571 UT) capsule capsule Take 1 capsule by mouth 1 (One) Time Per Week. Monday    Khari Mar MD        Social History:   Social History     Tobacco Use    Smoking status: Former     Current packs/day: 0.00     Average packs/day: 1 pack/day for 15.0 years (15.0 ttl pk-yrs)     Types: Cigarettes     Start date: 2007     Quit date: 2022     Years since quittin.4     Passive exposure: Past    Smokeless tobacco: Never   Vaping Use    Vaping status: Never Used   Substance Use Topics    Alcohol use: Never    Drug use: Never         Review of Systems:  Review of Systems   Constitutional:  Positive for chills. Negative for fever.   HENT:  Negative for sore throat.    Respiratory:  Positive for cough, chest tightness and shortness of breath.    Cardiovascular:  Negative for chest pain and  "leg swelling.   Gastrointestinal:  Negative for diarrhea, nausea and vomiting.        Physical Exam:  /97 (BP Location: Left arm, Patient Position: Sitting)   Pulse 94   Temp 98.6 °F (37 °C) (Oral)   Resp 16   Ht 157.5 cm (62\")   Wt 103 kg (226 lb 10.1 oz)   SpO2 94%   BMI 41.45 kg/m²     Physical Exam  Vitals and nursing note reviewed.   Constitutional:       General: She is not in acute distress.     Appearance: Normal appearance. She is well-developed. She is obese. She is not ill-appearing, toxic-appearing or diaphoretic.   HENT:      Head: Normocephalic and atraumatic.      Nose: Nose normal.   Eyes:      Extraocular Movements: Extraocular movements intact.      Conjunctiva/sclera: Conjunctivae normal.      Pupils: Pupils are equal, round, and reactive to light.   Cardiovascular:      Rate and Rhythm: Regular rhythm. Tachycardia present.      Heart sounds: Normal heart sounds.   Pulmonary:      Effort: Pulmonary effort is normal.      Breath sounds: Normal breath sounds. Examination of the right-upper field reveals wheezing and rhonchi. Examination of the right-lower field reveals wheezing.   Abdominal:      General: Abdomen is flat.      Palpations: Abdomen is soft.   Musculoskeletal:         General: Normal range of motion.      Cervical back: Normal range of motion and neck supple.      Right lower leg: No tenderness. No edema.      Left lower leg: No tenderness. No edema.   Skin:     General: Skin is warm and dry.   Neurological:      General: No focal deficit present.      Mental Status: She is alert and oriented to person, place, and time.   Psychiatric:         Mood and Affect: Mood normal.         Behavior: Behavior normal.         Thought Content: Thought content normal.         Judgment: Judgment normal.                  Medical Decision Making:    Comorbidities that affect care:    Asthma, obesity, hypertension    External Notes reviewed:    Previous ED Note: Emergency department visit " from [facility] emergency department on 4-      The following orders were placed and all results were independently analyzed by me:  Orders Placed This Encounter   Procedures    COVID-19, FLU A/B, RSV PCR 1 HR TAT - Swab, Nasopharynx    XR Chest 1 View    [place] Draw    High Sensitivity Troponin T    Comprehensive Metabolic Panel    Lipase    BNP    Magnesium    CBC Auto Differential    NPO Diet NPO Type: Strict NPO    Undress & Gown    Continuous Pulse Oximetry    Oxygen Therapy- Nasal Cannula; Titrate 1-6 LPM Per SpO2; 90 - 95%    ECG 12 Lead ED Triage Standing Order; Chest Pain    ECG 12 Lead ED Triage Standing Order; Chest Pain    Insert Peripheral IV    CBC & Differential    Green Top (Gel)    Lavender Top    Gold Top - SST    Light Blue Top       Medications Given in the Emergency Department:  Medications   sodium chloride 0.9 % flush 10 mL (has no administration in time range)   albuterol (PROVENTIL) nebulizer solution 0.083% 2.5 mg/3mL (2.5 mg Nebulization Given 4/27/25 2212)        ED Course:         Labs:    Lab Results (last 24 hours)       Procedure Component Value Units Date/Time    CBC & Differential [[id number]]  (Abnormal) Collected: 04/27/25 2056    Specimen: Blood Updated: 04/27/25 2107    Narrative:      The following orders were created for panel order CBC & Differential.  Procedure                               Abnormality         Status                     ---------                               -----------         ------                     CBC Auto Differential[[id number]]        Abnormal            Final result                 Please view results for these tests on the individual orders.    CBC Auto Differential [[id number]]  (Abnormal) Collected: 04/27/25 2056    Specimen: Blood Updated: 04/27/25 2107     WBC 8.61 10*3/mm3      RBC 5.81 10*6/mm3      Hemoglobin 17.7 g/dL      Hematocrit 55.2 %      MCV 95.0 fL      MCH 30.5 pg      MCHC 32.1 g/dL      RDW 14.0 %       RDW-SD 49.2 fl      MPV 10.2 fL      Platelets 188 10*3/mm3      Neutrophil % 29.7 %      Lymphocyte % 49.0 %      Monocyte % 10.5 %      Eosinophil % 9.6 %      Basophil % 1.0 %      Immature Grans % 0.2 %      Neutrophils, Absolute 2.55 10*3/mm3      Lymphocytes, Absolute 4.22 10*3/mm3      Monocytes, Absolute 0.90 10*3/mm3      Eosinophils, Absolute 0.83 10*3/mm3      Basophils, Absolute 0.09 10*3/mm3      Immature Grans, Absolute 0.02 10*3/mm3      nRBC 0.0 /100 WBC     COVID-19, FLU A/B, RSV PCR 1 HR TAT - Swab, Nasopharynx [[id number]]  (Normal) Collected: 04/27/25 2059    Specimen: Swab from Nasopharynx Updated: 04/27/25 2144     COVID19 Not Detected     Influenza A PCR Not Detected     Influenza B PCR Not Detected     RSV, PCR Not Detected    Narrative:      Fact sheet for providers: https://www.fda.gov/media/938242/download    Fact sheet for patients: https://www.fda.gov/media/189321/download    Test performed by PCR.    High Sensitivity Troponin T [[id number]]  (Normal) Collected: 04/27/25 2135    Specimen: Blood Updated: 04/27/25 2202     HS Troponin T <6 ng/L     Narrative:      High Sensitive Troponin T Reference Range:  <14.0 ng/L- Negative Female for AMI  <22.0 ng/L- Negative Male for AMI  >=14 - Abnormal Female indicating possible myocardial injury.  >=22 - Abnormal Male indicating possible myocardial injury.   Clinicians would have to utilize clinical acumen, EKG, Troponin, and serial changes to determine if it is an Acute Myocardial Infarction or myocardial injury due to an underlying chronic condition.         Comprehensive Metabolic Panel [[id number]]  (Abnormal) Collected: 04/27/25 2135    Specimen: Blood Updated: 04/27/25 2202     Glucose 99 mg/dL      BUN 7 mg/dL      Creatinine 0.76 mg/dL      Sodium 138 mmol/L      Potassium 4.0 mmol/L      Chloride 104 mmol/L      CO2 22.4 mmol/L      Calcium 9.4 mg/dL      Total Protein 7.3 g/dL      Albumin 4.1 g/dL      ALT (SGPT) 71 U/L      AST (SGOT) 65  U/L      Alkaline Phosphatase 91 U/L      Total Bilirubin 0.6 mg/dL      Globulin 3.2 gm/dL      A/G Ratio 1.3 g/dL      BUN/Creatinine Ratio 9.2     Anion Gap 11.6 mmol/L      eGFR 101.7 mL/min/1.73     Narrative:      GFR Categories in Chronic Kidney Disease (CKD)      GFR Category          GFR (mL/min/1.73)    Interpretation  G1                     90 or greater         Normal or high (1)  G2                      60-89                Mild decrease (1)  G3a                   45-59                Mild to moderate decrease  G3b                   30-44                Moderate to severe decrease  G4                    15-29                Severe decrease  G5                    14 or less           Kidney failure          (1)In the absence of evidence of kidney disease, neither GFR category G1 or G2 fulfill the criteria for CKD.    eGFR calculation 2021 CKD-EPI creatinine equation, which does not include race as a factor    Lipase [[id number]]  (Normal) Collected: 04/27/25 2135    Specimen: Blood Updated: 04/27/25 2202     Lipase 33 U/L     BNP [[id number]]  (Normal) Collected: 04/27/25 2135    Specimen: Blood Updated: 04/27/25 2200     proBNP <36.0 pg/mL     Narrative:      This assay is used as an aid in the diagnosis of individuals suspected of having heart failure. It can be used as an aid in the diagnosis of acute decompensated heart failure (ADHF) in patients presenting with signs and symptoms of ADHF to the emergency department (ED). In addition, NT-proBNP of <300 pg/mL indicates ADHF is not likely.    Age Range Result Interpretation  NT-proBNP Concentration (pg/mL:      <50             Positive            >450                   Gray                 300-450                    Negative             <300    50-75           Positive            >900                  Gray                300-900                  Negative            <300      >75             Positive            >1800                  Gibson                 300-1800                  Negative            <300    Magnesium [[id number]]  (Normal) Collected: 04/27/25 2135    Specimen: Blood Updated: 04/27/25 2202     Magnesium 1.9 mg/dL              Imaging:    XR Chest 1 View  Result Date: 4/27/2025  XR CHEST 1 VW Date of Exam: 4/27/2025 9:31 PM EDT Indication: Chest Pain Triage Protocol Comparison: 12/28/2024 Findings: Cardiac and mediastinal contours are normal. Pulmonary vascularity is normal. The lungs are clear. No pneumothorax. There is mild chronic elevation of the right hemidiaphragm.     No active disease. Electronically Signed: Ervin Diaz MD  4/27/2025 9:43 PM EDT  Workstation ID: [id number]        Differential Diagnosis and Discussion:    Dyspnea: Differential diagnosis includes but is not limited to metabolic acidosis, neurological disorders, psychogenic, asthma, pneumothorax, upper airway obstruction, COPD, pneumonia, noncardiogenic pulmonary edema, interstitial lung disease, anemia, congestive heart failure, and pulmonary embolism    PROCEDURES:    Labs were collected in the emergency department and all labs were reviewed and interpreted by me.  X-ray were performed in the emergency department and all X-ray impressions were independently interpreted by me.  An EKG was performed and the EKG was interpreted by me.  An EKG was performed and the EKG was interpreted by supervising attending.    ECG 12 Lead ED Triage Standing Order; Chest Pain   Preliminary Result   HEART GJQO=171  bpm   RR Eqytmqmk=889  ms   SC Xyvfhcxu=048  ms   P Horizontal Axis=14  deg   P Front Axis=64  deg   QRSD Interval=76  ms   QT Bueotkbd=456  ms   TGkA=669  ms   QRS Axis=22  deg   T Wave Axis=43  deg   - BORDERLINE ECG -   Sinus tachycardia   Low voltage, extremity and precordial leads   Date and Time of Study:2025-04-27 20:32:52          Procedures    MDM  Number of Diagnoses or Management Options  Acute bronchitis, unspecified organism  Diagnosis management comments: Patient  presented to the emergency department today for evaluation of dyspnea.  CBC was normal white blood cell count with hemoglobin 17.7, hematocrit 55.2.  CMP notes to ALT of 71 AST of 65 and these are chronic findings for patient otherwise normal liver kidney function.  BMP unremarkable.  Troponin negative.  Lipase normal.  Magnesium unremarkable.  Viral testing is negative.  EKG was sinus tachycardia but no acute signs of ischemia.  Chest x-ray had no acute findings.  Patient did have wheezing on initial examination was given albuterol treatment with improvement of wheezing but did continue to have rhonchi.  Patient is already been treated with azithromycin outpatient without any improvement.  Will begin doxycycline and prednisone.  Return to the emergency department guidelines provided.       Amount and/or Complexity of Data Reviewed  Clinical lab tests: reviewed and ordered  Tests in the radiology section of CPT®: reviewed and ordered  Decide to obtain previous medical records or to obtain history from someone other than the patient: yes    Risk of Complications, Morbidity, and/or Mortality  Presenting problems: moderate  Diagnostic procedures: low  Management options: low    Patient Progress  Patient progress: stable       Patient Care Considerations:    CT CHEST: I considered ordering a CT scan of the chest, however patient has not hypoxic and did have improvement after albuterol treatment      Consultants/Shared Management Plan:    None    Social Determinants of Health:    Patient is independent, reliable, and has access to care.       Disposition and Care Coordination:    Discharged: The patient is suitable and stable for discharge with no need for consideration of admission.    I have explained the patient´s condition, diagnoses and treatment plan based on the information available to me at this time. I have answered questions and addressed any concerns. The patient has a good  understanding of the patient´s  diagnosis, condition, and treatment plan as can be expected at this point. The vital signs have been stable. The patient´s condition is stable and appropriate for discharge from the emergency department.      The patient will pursue further outpatient evaluation with the primary care physician or other designated or consulting physician as outlined in the discharge instructions. They are agreeable to this plan of care and follow-up instructions have been explained in detail. The patient has received these instructions in written format and has expressed an understanding of the discharge instructions. The patient is aware that any significant change in condition or worsening of symptoms should prompt an immediate return to this or the closest emergency department or call to [facility].  I have explained discharge medications and the need for follow up with the patient/caretakers. This was also printed in the discharge instructions. Patient was discharged with the following medications and follow up:      Medication List        New Prescriptions      doxycycline 100 MG capsule  Commonly known as: MONODOX  Take 1 capsule by mouth 2 (Two) Times a Day for 7 days.     methylPREDNISolone 4 MG dose pack  Commonly known as: MEDROL  Take 6 tablets by mouth Daily for 1 day, THEN 5 tablets Daily for 1 day, THEN 4 tablets Daily for 1 day, THEN 3 tablets Daily for 1 day, THEN 2 tablets Daily for 1 day, THEN 1 tablet Daily for 1 day. Take as directed on package instructions.  Start taking on: April 27, 2025               Where to Get Your Medications        These medications were sent to [facility] - [place], [place] - [address] - [phone]  - [phone] FX  [address] [facility] [address]      Phone: [phone]   doxycycline 100 MG capsule  methylPREDNISolone 4 MG dose pack      Tricia Esparza MD  [address] [place] [address] [id number] [phone]             Final diagnoses:   Acute bronchitis,  unspecified organism        ED Disposition       ED Disposition   Discharge    Condition   Stable    Comment   --               This medical record created using voice recognition software.             Richar Dodge PA-C  04/27/25 [id number]

## 2025-04-29 ENCOUNTER — OFFICE VISIT (OUTPATIENT)
Dept: ORTHOPEDIC SURGERY | Facility: CLINIC | Age: 40
End: 2025-04-29
Payer: MEDICARE

## 2025-04-29 VITALS — HEART RATE: 93 BPM | BODY MASS INDEX: 41.59 KG/M2 | OXYGEN SATURATION: 98 % | HEIGHT: 62 IN | WEIGHT: 226 LBS

## 2025-04-29 DIAGNOSIS — S82.62XA CLOSED FRACTURE OF DISTAL LATERAL MALLEOLUS OF LEFT FIBULA, INITIAL ENCOUNTER: ICD-10-CM

## 2025-04-29 DIAGNOSIS — S93.402A SPRAIN OF LEFT ANKLE, UNSPECIFIED LIGAMENT, INITIAL ENCOUNTER: ICD-10-CM

## 2025-04-29 DIAGNOSIS — S99.912A LEFT ANKLE INJURY, INITIAL ENCOUNTER: Primary | ICD-10-CM

## 2025-04-29 PROCEDURE — 1160F RVW MEDS BY RX/DR IN RCRD: CPT | Performed by: PHYSICIAN ASSISTANT

## 2025-04-29 PROCEDURE — 1159F MED LIST DOCD IN RCRD: CPT | Performed by: PHYSICIAN ASSISTANT

## 2025-04-29 PROCEDURE — 99213 OFFICE O/P EST LOW 20 MIN: CPT | Performed by: PHYSICIAN ASSISTANT

## 2025-04-29 NOTE — PROGRESS NOTES
Chief Complaint  Pain and Follow-up of the Left Ankle     Subjective      History of Present Illness  The patient is a 39-year-old female who presents for follow-up on her left ankle. While putting clothes away, she stepped the wrong way and subsequently went to urgent care, where she was diagnosed with an ankle sprain after x-rays on 1/3/2025. A repeat x-ray with her primary care provider revealed a closed fracture of the medial malleolus of the left tibia. She had her initial evaluation on 03/18/2025 and was placed in a cam boot.     She reports experiencing bilateral pain in her left ankle, both medially and laterally. Discomfort with the use of the boot is expressed, citing it as a source of foot fatigue. Attempts at ambulation without the boot have been painful. The initial injury was characterized by pain and swelling, but there was no bruising. Swelling in the ankle is noted today. Intermittent sharp, aching pain, particularly when in a supine position, is described. Physical therapy was discontinued approximately one week ago after 3 to 4 days due to exacerbation of pain. Frustration with the previous physical therapy experience is expressed, as concerns were not adequately addressed. There is hesitancy to return to physical therapy and doubts about its efficacy in this case. Some relief from the boot is noted, but it is not complete. Curiosity about potential alternatives to the boot is expressed.    Additionally, the presence of spurs on the plantar aspect of the foot, which occasionally cause severe discomfort, is reported.      Allergies   Allergen Reactions    Sumatriptan Shortness Of Breath    Amoxicillin Hives     Hives and blisters    Tylenol [Acetaminophen] Hives        Social History     Socioeconomic History    Marital status: Single   Tobacco Use    Smoking status: Former     Current packs/day: 0.00     Average packs/day: 1 pack/day for 15.0 years (15.0 ttl pk-yrs)     Types: Cigarettes     Start  "date: 2007     Quit date: 2022     Years since quittin.4     Passive exposure: Past    Smokeless tobacco: Never   Vaping Use    Vaping status: Never Used   Substance and Sexual Activity    Alcohol use: Never    Drug use: Never    Sexual activity: Yes     Partners: Female     Birth control/protection: None        Tobacco Use: Medium Risk (2025)    Patient History     Smoking Tobacco Use: Former     Smokeless Tobacco Use: Never     Passive Exposure: Past     Patient reports they have a history of tobacco use; encouraged continued tobacco cessation for further health benefits.     I reviewed the patient's chief complaint, history of present illness, review of systems, past medical history, surgical history, family history, social history, medications, and allergy list.     Review of Systems     Constitutional: Denies fevers, chills, weight loss  Cardiovascular: Denies chest pain, shortness of breath  Skin: Denies rashes, acute skin changes  Neurologic: Denies headache, loss of consciousness    Vital Signs:   Pulse 93   Ht 157.5 cm (62\")   Wt 103 kg (226 lb)   SpO2 98%   BMI 41.34 kg/m²          Physical Exam  General: Alert. No acute distress    Ortho Exam    General: Alert. No acute distress.   Left lower extremity: Mild to moderate swelling of lateral and medial aspect of ankle.  No with palpation over the midfoot, hindfoot and forefoot.  Positive with palpation over the medial and lateral malleolus. Calf soft, nontender.  Demonstrates intact active dorsiflexion and plantarflexion of the ankle with moderate to severe stiffness. Demonstrates intact active toe flexion and extension with out pain. Sensation intact. Palpable pedal pulse. Less than 2-second capillary refill.     Physical Exam  Musculoskeletal:  Left Ankle: Tenderness noted in the front of the left ankle. Limited range of motion observed. Swelling present. Pain on movement.        Imaging Results (Most Recent)       Procedure Component " Value Units Date/Time    XR Ankle 2 View Left [[id number]] Resulted: 04/29/25 1302     Updated: 04/29/25 1302    Narrative:      X-Ray Report:  Study: X-rays ordered, taken in the office, and reviewed today  Indication: Follow-up left medial malleolus fracture  View: AP/Lateral view(s)  Findings: Interval healing of medial malleolus fracture  Prior studies available for comparison: yes               Result Review :       XR Ankle 2 View Left  Result Date: 4/29/2025  Narrative: X-Ray Report: Study: X-rays ordered, taken in the office, and reviewed today Indication: Follow-up left medial malleolus fracture View: AP/Lateral view(s) Findings: Interval healing of medial malleolus fracture Prior studies available for comparison: yes     XR Chest 1 View  Result Date: 4/27/2025  Narrative: XR CHEST 1 VW Date of Exam: 4/27/2025 9:31 PM EDT Indication: Chest Pain Triage Protocol Comparison: 12/28/2024 Findings: Cardiac and mediastinal contours are normal. Pulmonary vascularity is normal. The lungs are clear. No pneumothorax. There is mild chronic elevation of the right hemidiaphragm.     Impression: No active disease. Electronically Signed: Ervin Diaz MD  4/27/2025 9:43 PM EDT  Workstation ID: [id number]             Assessment and Plan     Diagnoses and all orders for this visit:    1. Left ankle injury, initial encounter (Primary)  -     XR Ankle 2 View Left  -     Ambulatory Referral to Physical Therapy for Evaluation & Treatment    2. Closed fracture of distal lateral malleolus of left fibula, initial encounter    3. Sprain of left ankle, unspecified ligament, initial encounter  -     Ambulatory Referral to Physical Therapy for Evaluation & Treatment        Assessment & Plan  X-rays obtained and reviewed with patient.  These show fracture stable with routine healing.  Reviewed and discussed with Dr. Hendricks    Provided lace up ankle brace as the boot is bothering her.  She inquired about something different.  Recommend she  try to transition from boot to lace up ankle brace as tolerated.    I do feel that she needs PT as her motion is very stiff.  She would like to try different physical therapy office.  Order placed today.    Continue with ice and elevation as needed for pain/swelling.    Weightbearing as tolerated.     Follow up 4 weeks with repeat x-rays.  May have to consider further imaging if no improvement  Call or return if worsening symptoms.      Patient or patient representative verbalized consent for the use of Ambient Listening during the visit with  Francesca Goetz PA-C for chart documentation. 4/29/2025  13:05 EDT    Follow Up   There are no Patient Instructions on file for this visit.        Patient was given instructions and counseling regarding her condition or for health maintenance advice. Please see specific information pulled into the AVS if appropriate.     Francesca Goetz PA-C   04/29/2025  13:02 EDT        Dictated Utilizing Dragon Dictation. Please note that portions of this note were completed with a voice recognition program. Part of this note may be an electronic transcription/translation of spoken language to printed text using the Dragon Dictation System.

## 2025-05-01 LAB
QT INTERVAL: 340 MS
QTC INTERVAL: 448 MS

## 2025-05-27 ENCOUNTER — OFFICE VISIT (OUTPATIENT)
Dept: ORTHOPEDIC SURGERY | Facility: CLINIC | Age: 40
End: 2025-05-27
Payer: MEDICARE

## 2025-05-27 VITALS
WEIGHT: 227.07 LBS | OXYGEN SATURATION: 94 % | SYSTOLIC BLOOD PRESSURE: 127 MMHG | HEIGHT: 62 IN | DIASTOLIC BLOOD PRESSURE: 84 MMHG | HEART RATE: 92 BPM | BODY MASS INDEX: 41.79 KG/M2

## 2025-05-27 DIAGNOSIS — S82.62XA CLOSED FRACTURE OF DISTAL LATERAL MALLEOLUS OF LEFT FIBULA, INITIAL ENCOUNTER: Primary | ICD-10-CM

## 2025-05-28 NOTE — PROGRESS NOTES
Chief Complaint  Follow-up of the Left Ankle     Subjective        History of Present Illness  The patient is a 39-year-old female who presents for a follow-up on her left ankle.    She initially injured her ankle while doing laundry and was diagnosed with an ankle sprain after x-rays on 01/03/2025. However, a repeat x-ray with her primary care physician revealed a closed fracture of the medial malleolus of the left tibia. She had her initial evaluation with Dr. Hendricks on 03/18/2025 and was placed in a CAM boot at that visit. When last seen on 04/29/2025, she was complaining of medial and lateral ankle pain with discomfort both with and without the boot.    She failed outpatient physical therapy due to exacerbation of pain and felt they were not adequately addressing her concerns.  Case was discussed with Dr. Mehta at last visit who recommended trying a new physical therapy place and other conservative measure before resorting to further imaging.  She is here to follow-up today on that.     Today, she reports a slight improvement in her condition; however, she continues to experience significant pain in her ankle, particularly when walking but now just on the medial side that radiates to the back of the ankle. She has been performing minor exercises at home but has not yet started physical therapy as they never contacted her. She finds the brace more beneficial than the CAM boot. She also notes persistent swelling in her ankle, which tends to worsen towards the end of the day but has also gradually improved. She has been applying ice to the affected area.    SOCIAL HISTORY  Exercise: Exercises at home      Allergies   Allergen Reactions    Sumatriptan Shortness Of Breath    Amoxicillin Hives     Hives and blisters    Tylenol [Acetaminophen] Hives        Social History     Socioeconomic History    Marital status: Single   Tobacco Use    Smoking status: Former     Current packs/day: 0.00     Average packs/day: 1  "pack/day for 15.0 years (15.0 ttl pk-yrs)     Types: Cigarettes     Start date: 2007     Quit date: 2022     Years since quittin.5     Passive exposure: Past    Smokeless tobacco: Never   Vaping Use    Vaping status: Never Used   Substance and Sexual Activity    Alcohol use: Never    Drug use: Never    Sexual activity: Yes     Partners: Female     Birth control/protection: None        Tobacco Use: Medium Risk (2025)    Patient History     Smoking Tobacco Use: Former     Smokeless Tobacco Use: Never     Passive Exposure: Past     Patient reports that they are a nonsmoker; cessation education not applicable.     I reviewed the patient's chief complaint, history of present illness, review of systems, past medical history, surgical history, family history, social history, medications, and allergy list.     Review of Systems     Constitutional: Denies fevers, chills, weight loss  Cardiovascular: Denies chest pain, shortness of breath  Skin: Denies rashes, acute skin changes  Neurologic: Denies headache, loss of consciousness    Vital Signs:   /84   Pulse 92   Ht 157.5 cm (62\")   Wt 103 kg (227 lb 1.2 oz)   SpO2 94%   BMI 41.53 kg/m²          Physical Exam  General: Alert. No acute distress    Ortho Exam    Left ankle: Patient demonstrates near full ankle plantarflexion and dorsiflexion, much improved from last visit.  No tenderness to the lateral malleolus.  Tenderness along the medial malleolus.  Mild swelling noted.  She is able to wiggle the toes.  Sensation neurovascularly intact with palpable pulse.  Brisk cap refill.  Physical Exam        Imaging Results (Most Recent)       Procedure Component Value Units Date/Time    XR Ankle 3+ View Left [[id number]] Resulted: 25     Updated: 25    Narrative:      X-Ray Report:  Study: X-rays ordered, taken in the office, and reviewed today  Indication: Follow-up left medial malleolus fracture  View: AP/lateral/Mortise " view(s)  Findings: Interval healing of left medial malleolus fracture  Prior studies available for comparison: Yes               Result Review :       XR Ankle 3+ View Left  Result Date: 5/27/2025  Narrative: X-Ray Report: Study: X-rays ordered, taken in the office, and reviewed today Indication: Follow-up left medial malleolus fracture View: AP/lateral/Mortise view(s) Findings: Interval healing of left medial malleolus fracture Prior studies available for comparison: Yes     XR Ankle 2 View Left  Result Date: 4/29/2025  Narrative: X-Ray Report: Study: X-rays ordered, taken in the office, and reviewed today Indication: Follow-up left medial malleolus fracture View: AP/Lateral view(s) Findings: Interval healing of medial malleolus fracture Prior studies available for comparison: yes              Assessment and Plan     Diagnoses and all orders for this visit:    1. Closed fracture of distal lateral malleolus of left fibula, initial encounter (Primary)  -     XR Ankle 3+ View Left        Assessment & Plan  1. Left ankle fracture:  Closed fracture of the medial malleolus of the left tibia.    X-rays obtained and reviewed with patient.  These show fracture stable with routine healing.     Continue use of ankle brace outside the home or on uneven terrain.  She can wean out of it around the house. Avoid high-impact activities such as running or jumping, and exercise caution when navigating uneven terrain.     Continue with ice and elevation as needed for pain/swelling.    He never heard from physical therapy and states at this point she feels that she is improving enough on her own and does not want to go.  Home exercises provided.  Advised to focus solely on resolving range of motion deficits without pain until she progresses to strengthening and balance.    Follow up 6 weeks with repeat x-rays.   Call or return if worsening symptoms.      Patient or patient representative verbalized consent for the use of Ambient  Listening during the visit with  Francesca Goetz PA-C for chart documentation. 5/28/2025  16:02 EDT    Follow Up   There are no Patient Instructions on file for this visit.        Patient was given instructions and counseling regarding her condition or for health maintenance advice. Please see specific information pulled into the AVS if appropriate.     Francesca Goetz PA-C   05/27/2025  15:58 EDT        Dictated Utilizing Dragon Dictation. Please note that portions of this note were completed with a voice recognition program. Part of this note may be an electronic transcription/translation of spoken language to printed text using the Dragon Dictation System.

## 2025-06-28 ENCOUNTER — APPOINTMENT (OUTPATIENT)
Dept: GENERAL RADIOLOGY | Facility: HOSPITAL | Age: 40
End: 2025-06-28
Payer: MEDICARE

## 2025-06-28 ENCOUNTER — HOSPITAL ENCOUNTER (EMERGENCY)
Facility: HOSPITAL | Age: 40
Discharge: HOME | End: 2025-06-28
Attending: EMERGENCY MEDICINE
Payer: MEDICARE

## 2025-06-28 ENCOUNTER — APPOINTMENT (OUTPATIENT)
Dept: CT IMAGING | Facility: HOSPITAL | Age: 40
End: 2025-06-28
Payer: MEDICARE

## 2025-06-28 VITALS
BODY MASS INDEX: 41.62 KG/M2 | OXYGEN SATURATION: 100 % | DIASTOLIC BLOOD PRESSURE: 85 MMHG | SYSTOLIC BLOOD PRESSURE: 132 MMHG | RESPIRATION RATE: 18 BRPM | WEIGHT: 226.19 LBS | HEIGHT: 62 IN | TEMPERATURE: 98 F | HEART RATE: 89 BPM

## 2025-06-28 DIAGNOSIS — R10.9 ABDOMINAL PAIN, UNSPECIFIED ABDOMINAL LOCATION: Primary | ICD-10-CM

## 2025-06-28 DIAGNOSIS — R07.9 CHEST PAIN, UNSPECIFIED TYPE: ICD-10-CM

## 2025-06-28 LAB
ALBUMIN SERPL-MCNC: 4.5 G/DL (ref 3.5–5.2)
ALBUMIN/GLOB SERPL: 1.9 G/DL
ALP SERPL-CCNC: 80 U/L (ref 39–117)
ALT SERPL W P-5'-P-CCNC: 62 U/L (ref 1–33)
ANION GAP SERPL CALCULATED.3IONS-SCNC: 10.7 MMOL/L (ref 5–15)
AST SERPL-CCNC: 37 U/L (ref 1–32)
BASOPHILS # BLD AUTO: 0.07 10*3/MM3 (ref 0–0.2)
BASOPHILS NFR BLD AUTO: 0.8 % (ref 0–1.5)
BILIRUB SERPL-MCNC: 0.7 MG/DL (ref 0–1.2)
BUN SERPL-MCNC: 7.4 MG/DL (ref 6–20)
BUN/CREAT SERPL: 10.1 (ref 7–25)
CALCIUM SPEC-SCNC: 9 MG/DL (ref 8.6–10.5)
CHLORIDE SERPL-SCNC: 105 MMOL/L (ref 98–107)
CO2 SERPL-SCNC: 21.3 MMOL/L (ref 22–29)
CREAT SERPL-MCNC: 0.73 MG/DL (ref 0.57–1)
DEPRECATED RDW RBC AUTO: 45.5 FL (ref 37–54)
EGFRCR SERPLBLD CKD-EPI 2021: 106.8 ML/MIN/1.73
EOSINOPHIL # BLD AUTO: 0.81 10*3/MM3 (ref 0–0.4)
EOSINOPHIL NFR BLD AUTO: 9.3 % (ref 0.3–6.2)
ERYTHROCYTE [DISTWIDTH] IN BLOOD BY AUTOMATED COUNT: 13.4 % (ref 12.3–15.4)
GEN 5 1HR TROPONIN T REFLEX: <6 NG/L
GLOBULIN UR ELPH-MCNC: 2.4 GM/DL
GLUCOSE SERPL-MCNC: 98 MG/DL (ref 65–99)
HCT VFR BLD AUTO: 47.8 % (ref 34–46.6)
HGB BLD-MCNC: 16.2 G/DL (ref 12–15.9)
HOLD SPECIMEN: NORMAL
HOLD SPECIMEN: NORMAL
IMM GRANULOCYTES # BLD AUTO: 0.02 10*3/MM3 (ref 0–0.05)
IMM GRANULOCYTES NFR BLD AUTO: 0.2 % (ref 0–0.5)
LIPASE SERPL-CCNC: 28 U/L (ref 13–60)
LYMPHOCYTES # BLD AUTO: 3.93 10*3/MM3 (ref 0.7–3.1)
LYMPHOCYTES NFR BLD AUTO: 45 % (ref 19.6–45.3)
MAGNESIUM SERPL-MCNC: 1.9 MG/DL (ref 1.6–2.6)
MCH RBC QN AUTO: 31 PG (ref 26.6–33)
MCHC RBC AUTO-ENTMCNC: 33.9 G/DL (ref 31.5–35.7)
MCV RBC AUTO: 91.6 FL (ref 79–97)
MONOCYTES # BLD AUTO: 0.6 10*3/MM3 (ref 0.1–0.9)
MONOCYTES NFR BLD AUTO: 6.9 % (ref 5–12)
NEUTROPHILS NFR BLD AUTO: 3.3 10*3/MM3 (ref 1.7–7)
NEUTROPHILS NFR BLD AUTO: 37.8 % (ref 42.7–76)
NRBC BLD AUTO-RTO: 0 /100 WBC (ref 0–0.2)
NT-PROBNP SERPL-MCNC: 102.6 PG/ML (ref 0–450)
PLATELET # BLD AUTO: 223 10*3/MM3 (ref 140–450)
PMV BLD AUTO: 9.9 FL (ref 6–12)
POTASSIUM SERPL-SCNC: 3.8 MMOL/L (ref 3.5–5.2)
PROT SERPL-MCNC: 6.9 G/DL (ref 6–8.5)
QT INTERVAL: 380 MS
QTC INTERVAL: 419 MS
RBC # BLD AUTO: 5.22 10*6/MM3 (ref 3.77–5.28)
SODIUM SERPL-SCNC: 137 MMOL/L (ref 136–145)
TROPONIN T NUMERIC DELTA: NORMAL
TROPONIN T SERPL HS-MCNC: <6 NG/L
WBC NRBC COR # BLD AUTO: 8.73 10*3/MM3 (ref 3.4–10.8)
WHOLE BLOOD HOLD COAG: NORMAL
WHOLE BLOOD HOLD SPECIMEN: NORMAL

## 2025-06-28 PROCEDURE — 93005 ELECTROCARDIOGRAM TRACING: CPT

## 2025-06-28 PROCEDURE — 83880 ASSAY OF NATRIURETIC PEPTIDE: CPT

## 2025-06-28 PROCEDURE — 84484 ASSAY OF TROPONIN QUANT: CPT | Performed by: EMERGENCY MEDICINE

## 2025-06-28 PROCEDURE — 71275 CT ANGIOGRAPHY CHEST: CPT

## 2025-06-28 PROCEDURE — 83690 ASSAY OF LIPASE: CPT

## 2025-06-28 PROCEDURE — 25510000001 IOPAMIDOL PER 1 ML: Performed by: EMERGENCY MEDICINE

## 2025-06-28 PROCEDURE — 93005 ELECTROCARDIOGRAM TRACING: CPT | Performed by: EMERGENCY MEDICINE

## 2025-06-28 PROCEDURE — 99285 EMERGENCY DEPT VISIT HI MDM: CPT

## 2025-06-28 PROCEDURE — 93010 ELECTROCARDIOGRAM REPORT: CPT | Performed by: INTERNAL MEDICINE

## 2025-06-28 PROCEDURE — 84484 ASSAY OF TROPONIN QUANT: CPT

## 2025-06-28 PROCEDURE — 85025 COMPLETE CBC W/AUTO DIFF WBC: CPT

## 2025-06-28 PROCEDURE — 36415 COLL VENOUS BLD VENIPUNCTURE: CPT

## 2025-06-28 PROCEDURE — 71045 X-RAY EXAM CHEST 1 VIEW: CPT

## 2025-06-28 PROCEDURE — 25810000003 SODIUM CHLORIDE 0.9 % SOLUTION: Performed by: EMERGENCY MEDICINE

## 2025-06-28 PROCEDURE — 96360 HYDRATION IV INFUSION INIT: CPT

## 2025-06-28 PROCEDURE — 74177 CT ABD & PELVIS W/CONTRAST: CPT

## 2025-06-28 PROCEDURE — 83735 ASSAY OF MAGNESIUM: CPT

## 2025-06-28 PROCEDURE — 80053 COMPREHEN METABOLIC PANEL: CPT

## 2025-06-28 RX ORDER — IOPAMIDOL 755 MG/ML
100 INJECTION, SOLUTION INTRAVASCULAR
Status: COMPLETED | OUTPATIENT
Start: 2025-06-28 | End: 2025-06-28

## 2025-06-28 RX ORDER — ASPIRIN 81 MG/1
324 TABLET, CHEWABLE ORAL ONCE
Status: DISCONTINUED | OUTPATIENT
Start: 2025-06-28 | End: 2025-06-28 | Stop reason: HOSPADM

## 2025-06-28 RX ORDER — SODIUM CHLORIDE 0.9 % (FLUSH) 0.9 %
10 SYRINGE (ML) INJECTION AS NEEDED
Status: DISCONTINUED | OUTPATIENT
Start: 2025-06-28 | End: 2025-06-28 | Stop reason: HOSPADM

## 2025-06-28 RX ADMIN — IOPAMIDOL 100 ML: 755 INJECTION, SOLUTION INTRAVENOUS at 17:16

## 2025-06-28 RX ADMIN — SODIUM CHLORIDE 1000 ML: 9 INJECTION, SOLUTION INTRAVENOUS at 16:45

## 2025-06-28 NOTE — ED PROVIDER NOTES
Time: 4:01 PM EDT  Date of encounter:  6/28/2025  Independent Historian/Clinical History and Information was obtained by:   Patient    History is limited by: N/A    Chief Complaint: Abdominal pain, chest pain      History of Present Illness:  Patient is a 39 y.o. year old female who presents to the emergency department for evaluation of abdominal pain and chest pain.  Patient has a history of clotting disorder on Xarelto who presents with complaints of chest pain as well as lower abdominal pain.  States that on Tuesday she had surgery on her belly to remove a cyst.  States that she was off her blood thinners for a couple days.  States that she has been having belly pain that has been there since she had her surgery.  Her pain is noted to be around her incision site just where her incision was from her surgery.  States has been hurting since she had surgery.  She also reports that she has been having some intermittent chest pain.  This been ongoing for the last 24 hours or so.  Not currently having the chest pain.  Does have a history of clots in the past.  No other complaints this time.  States the main reason she is here is because of the belly pain.  She has not spoke with her Doctor about this.      Patient Care Team  Primary Care Provider: Tricai Esparza MD    Past Medical History:     Allergies   Allergen Reactions    Sumatriptan Shortness Of Breath    Amoxicillin Hives     Hives and blisters    Tylenol [Acetaminophen] Hives     Past Medical History:   Diagnosis Date    Abnormal ECG     Acid reflux     Anemia     Anxiety 02/17/2014    Asthma     Bleeding disorder     Cholelithiasis     Clotting disorder     Deep vein thrombosis 2018    Depression     Foot sprain, right, initial encounter 10/21/2015    GERD (gastroesophageal reflux disease)     HTN (hypertension)     gestational    Migraine headache     Pancreatitis 11/2022    [facility]    Pineal gland cyst 02/21/2014    Renal cyst 02/21/2014      Past Surgical History:   Procedure Laterality Date     SECTION      CHOLECYSTECTOMY      COLONOSCOPY N/A 2023    Procedure: COLONOSCOPY WITH BIOPSIES;  Surgeon: Feliz Olguin MD;  Location: [facility] ENDOSCOPY;  Service: Gastroenterology;  Laterality: N/A;  NORMAL COLONOSCOPY    ENDOSCOPY N/A 10/22/2021    Procedure: ESOPHAGOGASTRODUODENOSCOPY WITH BIOPSIES;  Surgeon: Feliz Olguin MD;  Location: [facility] ENDOSCOPY;  Service: Gastroenterology;  Laterality: N/A;  NORMAL EGD    ENDOSCOPY N/A 2023    Procedure: ESOPHAGOGASTRODUODENOSCOPY WITH BIOPSIES;  Surgeon: Feliz Olguin MD;  Location: [facility] ENDOSCOPY;  Service: Gastroenterology;  Laterality: N/A;  HIATAL HERNIA    ENDOSCOPY N/A 3/22/2024    Procedure: ESOPHAGOGASTRODUODENOSCOPY WITH BIOPSIES;  Surgeon: Feliz Olguin MD;  Location: [facility] ENDOSCOPY;  Service: Gastroenterology;  Laterality: N/A;  RETAINED FOOD IN THE STOMACH, EROSIVE GASTRITIS, REFLUX ESOPHAGITIS    LASER ABLATION      TUBAL ABDOMINAL LIGATION  2012     Family History   Problem Relation Age of Onset    Diabetes Mother     Arthritis Mother     Anxiety disorder Mother     Hyperlipidemia Mother     Liver disease Mother     Stroke Mother     Thyroid disease Mother     Other Father         cardio vascular disease    Diabetes Father     Kidney failure Father     Arthritis Father     Colon cancer Neg Hx        Home Medications:  Prior to Admission medications    Medication Sig Start Date End Date Taking? Authorizing Provider   albuterol sulfate  (90 Base) MCG/ACT inhaler Inhale 2 puffs Every 4 (Four) Hours As Needed for Wheezing. 24   Mj Lala DO   amitriptyline (ELAVIL) 25 MG tablet Take 1 tablet by mouth Every Night. 9/15/23   Khari Mar MD   amitriptyline (ELAVIL) 50 MG tablet Take 1 tablet by mouth every night at bedtime. 24   Khari Mar MD   fenofibrate (TRICOR) 145 MG tablet Take 1 tablet by mouth.  "23   Khari Mar MD   levETIRAcetam (KEPPRA) 500 MG tablet Take 1 tablet by mouth 2 (Two) Times a Day.    Khari Mar MD   montelukast (SINGULAIR) 10 MG tablet Take 1 tablet by mouth Every Night.    Khari Mar MD   naproxen (NAPROSYN) 500 MG tablet Take 1 tablet by mouth. 24   Khari Mar MD   Pancrelipase, Lip-Prot-Amyl, (Creon) 93254-730241 units capsule delayed-release particles capsule Take 1 capsule by mouth Take As Directed. Take 2 capsule with every meal and 1 capsule with snacks 10/20/23   Thao Valles APRN   pantoprazole (PROTONIX) 20 MG EC tablet Take 1 tablet by mouth Daily. 24   Seaver, Alyce B, APRN   propranolol (INDERAL) 60 MG tablet Take 1 tablet by mouth 2 (Two) Times a Day. 9/15/23   Khari Mar MD   rivaroxaban (XARELTO) 20 MG tablet Take 1 tablet by mouth Daily.    Khari Mar MD   sertraline (ZOLOFT) 50 MG tablet Take 1 tablet by mouth Daily. 1/3/23   Khari Mar MD   Ubrelvy 100 MG tablet Take 1 tablet by mouth at onset of migraine, may repeat in 2 hours if no relief, max of 2 per day 3/14/25   Khari Mar MD   vitamin D (ERGOCALCIFEROL) 1.25 MG (90524 UT) capsule capsule Take 1 capsule by mouth 1 (One) Time Per Week. Monday    Khari Mar MD        Social History:   Social History     Tobacco Use    Smoking status: Former     Current packs/day: 0.00     Average packs/day: 1 pack/day for 15.0 years (15.0 ttl pk-yrs)     Types: Cigarettes     Start date: 2007     Quit date: 2022     Years since quittin.6     Passive exposure: Past    Smokeless tobacco: Never   Vaping Use    Vaping status: Never Used   Substance Use Topics    Alcohol use: Never    Drug use: Never         Review of Systems:  Review of Systems     Physical Exam:  /92 (BP Location: Left arm, Patient Position: Sitting)   Pulse 83   Temp 97.9 °F (36.6 °C) (Oral)   Resp 17   Ht 157.5 cm (62\")   Wt 103 " kg (226 lb 3.1 oz)   SpO2 97%   BMI 41.37 kg/m²     Physical Exam  Vitals and nursing note reviewed.   Constitutional:       Appearance: Normal appearance.   HENT:      Head: Normocephalic and atraumatic.   Eyes:      General: No scleral icterus.  Cardiovascular:      Rate and Rhythm: Normal rate and regular rhythm.      Heart sounds: Normal heart sounds.   Pulmonary:      Effort: Pulmonary effort is normal.      Breath sounds: Normal breath sounds.   Abdominal:      Palpations: Abdomen is soft.      Tenderness: There is no abdominal tenderness.      Comments: Mild suprapubic tenderness near her incision site.  There is no drainage noted.  There is no signs of infection noted.  Other wounds appear to be well-healing.   Musculoskeletal:         General: Normal range of motion.      Cervical back: Normal range of motion.   Skin:     Findings: No rash.   Neurological:      General: No focal deficit present.      Mental Status: She is alert.                    Medical Decision Making:      Comorbidities that affect care:    Obesity, blood clots    External Notes reviewed:    Reviewed note from 6/24/2025      The following orders were placed and all results were independently analyzed by me:  Orders Placed This Encounter   Procedures    XR Chest 1 View    CT Angiogram Chest Pulmonary Embolism    CT Abdomen Pelvis With Contrast    Egan Draw    High Sensitivity Troponin T    Comprehensive Metabolic Panel    Lipase    BNP    Magnesium    CBC Auto Differential    High Sensitivity Troponin T 1Hr    NPO Diet NPO Type: Strict NPO    Undress & Gown    Continuous Pulse Oximetry    Oxygen Therapy- Nasal Cannula; Titrate 1-6 LPM Per SpO2; 90 - 95%    ECG 12 Lead ED Triage Standing Order; Chest Pain    ECG 12 Lead ED Triage Standing Order; Chest Pain    Insert Peripheral IV    CBC & Differential    Green Top (Gel)    Lavender Top    Gold Top - SST    Light Blue Top       Medications Given in the Emergency  Department:  Medications   sodium chloride 0.9 % flush 10 mL (has no administration in time range)   aspirin chewable tablet 324 mg (324 mg Oral Not Given 6/28/25 1753)   sodium chloride 0.9 % bolus 1,000 mL (1,000 mL Intravenous New Bag 6/28/25 1645)   iopamidol (ISOVUE-370) 76 % injection 100 mL (100 mL Intravenous Given 6/28/25 1716)        ED Course:    ED Course as of 06/28/25 1812   Sat Jun 28, 2025   1534 EKG inter by me  Time: 1414  Heart rate 73  Sinus, normal QRS, normal axis, no acute ischemia [MA]      ED Course User Index  [MA] Ernst Peters MD       Labs:    Lab Results (last 24 hours)       Procedure Component Value Units Date/Time    High Sensitivity Troponin T [[id number]]  (Normal) Collected: 06/28/25 1422    Specimen: Blood from Arm, Right Updated: 06/28/25 1450     HS Troponin T <6 ng/L     Narrative:      High Sensitive Troponin T Reference Range:  <14.0 ng/L- Negative Female for AMI  <22.0 ng/L- Negative Male for AMI  >=14 - Abnormal Female indicating possible myocardial injury.  >=22 - Abnormal Male indicating possible myocardial injury.   Clinicians would have to utilize clinical acumen, EKG, Troponin, and serial changes to determine if it is an Acute Myocardial Infarction or myocardial injury due to an underlying chronic condition.         CBC & Differential [[id number]]  (Abnormal) Collected: 06/28/25 1422    Specimen: Blood from Arm, Right Updated: 06/28/25 1429    Narrative:      The following orders were created for panel order CBC & Differential.  Procedure                               Abnormality         Status                     ---------                               -----------         ------                     CBC Auto Differential[[id number]]        Abnormal            Final result                 Please view results for these tests on the individual orders.    Comprehensive Metabolic Panel [[id number]]  (Abnormal) Collected: 06/28/25 1422    Specimen: Blood from Arm, Right  Updated: 06/28/25 1450     Glucose 98 mg/dL      BUN 7.4 mg/dL      Creatinine 0.73 mg/dL      Sodium 137 mmol/L      Potassium 3.8 mmol/L      Comment: Slight hemolysis detected by analyzer. Result may be falsely elevated.        Chloride 105 mmol/L      CO2 21.3 mmol/L      Calcium 9.0 mg/dL      Total Protein 6.9 g/dL      Albumin 4.5 g/dL      ALT (SGPT) 62 U/L      AST (SGOT) 37 U/L      Alkaline Phosphatase 80 U/L      Total Bilirubin 0.7 mg/dL      Globulin 2.4 gm/dL      A/G Ratio 1.9 g/dL      BUN/Creatinine Ratio 10.1     Anion Gap 10.7 mmol/L      eGFR 106.8 mL/min/1.73     Narrative:      GFR Categories in Chronic Kidney Disease (CKD)              GFR Category          GFR (mL/min/1.73)    Interpretation  G1                    90 or greater        Normal or high (1)  G2                    60-89                Mild decrease (1)  G3a                   45-59                Mild to moderate decrease  G3b                   30-44                Moderate to severe decrease  G4                    15-29                Severe decrease  G5                    14 or less           Kidney failure    (1)In the absence of evidence of kidney disease, neither GFR category G1 or G2 fulfill the criteria for CKD.    eGFR calculation 2021 CKD-EPI creatinine equation, which does not include race as a factor    Lipase [[id number]]  (Normal) Collected: 06/28/25 1422    Specimen: Blood from Arm, Right Updated: 06/28/25 1450     Lipase 28 U/L     BNP [[id number]]  (Normal) Collected: 06/28/25 1422    Specimen: Blood from Arm, Right Updated: 06/28/25 1448     proBNP 102.6 pg/mL     Narrative:      This assay is used as an aid in the diagnosis of individuals suspected of having heart failure. It can be used as an aid in the diagnosis of acute decompensated heart failure (ADHF) in patients presenting with signs and symptoms of ADHF to the emergency department (ED). In addition, NT-proBNP of <300 pg/mL indicates ADHF is not  likely.    Age Range Result Interpretation  NT-proBNP Concentration (pg/mL:      <50             Positive            >450                   Gray                 300-450                    Negative             <300    50-75           Positive            >900                  Gray                300-900                  Negative            <300      >75             Positive            >1800                  Gray                300-1800                  Negative            <300    Magnesium [[id number]]  (Normal) Collected: 06/28/25 1422    Specimen: Blood from Arm, Right Updated: 06/28/25 1450     Magnesium 1.9 mg/dL     CBC Auto Differential [[id number]]  (Abnormal) Collected: 06/28/25 1422    Specimen: Blood from Arm, Right Updated: 06/28/25 1429     WBC 8.73 10*3/mm3      RBC 5.22 10*6/mm3      Hemoglobin 16.2 g/dL      Hematocrit 47.8 %      MCV 91.6 fL      MCH 31.0 pg      MCHC 33.9 g/dL      RDW 13.4 %      RDW-SD 45.5 fl      MPV 9.9 fL      Platelets 223 10*3/mm3      Neutrophil % 37.8 %      Lymphocyte % 45.0 %      Monocyte % 6.9 %      Eosinophil % 9.3 %      Basophil % 0.8 %      Immature Grans % 0.2 %      Neutrophils, Absolute 3.30 10*3/mm3      Lymphocytes, Absolute 3.93 10*3/mm3      Monocytes, Absolute 0.60 10*3/mm3      Eosinophils, Absolute 0.81 10*3/mm3      Basophils, Absolute 0.07 10*3/mm3      Immature Grans, Absolute 0.02 10*3/mm3      nRBC 0.0 /100 WBC     High Sensitivity Troponin T 1Hr [[id number]] Collected: 06/28/25 1645    Specimen: Blood Updated: 06/28/25 1711     HS Troponin T <6 ng/L      Troponin T Numeric Delta --     Comment: Unable to calculate.       Narrative:      High Sensitive Troponin T Reference Range:  <14.0 ng/L- Negative Female for AMI  <22.0 ng/L- Negative Male for AMI  >=14 - Abnormal Female indicating possible myocardial injury.  >=22 - Abnormal Male indicating possible myocardial injury.   Clinicians would have to utilize clinical acumen, EKG, Troponin, and serial  changes to determine if it is an Acute Myocardial Infarction or myocardial injury due to an underlying chronic condition.                  Imaging:    CT Angiogram Chest Pulmonary Embolism  Result Date: 6/28/2025  CT ANGIOGRAM CHEST PULMONARY EMBOLISM, CT ABDOMEN PELVIS W CONTRAST Date of Exam: 6/28/2025 4:56 PM EDT Indication: Pulmonary Embolism. Comparison: None available. Technique: Axial CT images were obtained of the chest, abdomen, and pelvis after the uneventful intravenous administration of iodinated contrast utilizing pulmonary embolism protocol.  Reconstructed coronal and sagittal images were also obtained. In addition, a 3-D volume rendered image was created for interpretation.  Automated exposure control and iterative construction methods were used. Findings: No consolidation. No pneumothorax or pleural effusion. The thyroid gland is normal. The subglottic airway is clear. No evidence of aortic dissection. 3.7 cm prominence of the main pulmonary artery which may indicate pulmonary arterial hypertension. No pulmonary embolus. No reflux of IV contrast into the hepatic veins. No mediastinal, perihilar, or axillary adenopathy. No supraclavicular adenopathy. Thoracic vertebral body height and alignment are normal. The sternum is intact. No rib fractures. Liver:No masses. No intrahepatic biliary ductal dilatation. Spleen:No masses. No perisplenic hematoma. Pancreas:No pancreatic masses. No evidence of pancreatitis. Gallbladder and common bile duct: Prior cholecystectomy Adrenal glands:No adrenal masses Kidneys and ureters:No kidney stones. No renal masses.No calculi present within the ureters. Normal caliber ureters. Urinary bladder:No urinary bladder wall thickening. No bladder masses. Small bowel:Normal caliber small bowel. Large bowel:No diverticulosis or diverticulitis. No large bowel masses are appreciated Appendix: Normal GENITOURINARY: Normal appearance of the uterus. Ascites or pneumoperitoneum:None.  Adenopathy:None present Osseous structures: The proximal femurs are intact. The pubic bones are intact. Degenerative changes of the hips. The sacrum and sacroiliac joints are normal. Degenerative changes of the lumbar spine. The spinous and transverse processes are intact. No rib fractures. Other findings: Postsurgical changes related to salpingectomy. No acute pathology demonstrated.     1. No pulmonary embolus. No acute cardiopulmonary disease. 2. Postsurgical changes. No acute pathology demonstrated. Electronically Signed: Darrell Beltrán MD  6/28/2025 5:40 PM EDT  Workstation ID: [id number]    CT Abdomen Pelvis With Contrast  Result Date: 6/28/2025  CT ANGIOGRAM CHEST PULMONARY EMBOLISM, CT ABDOMEN PELVIS W CONTRAST Date of Exam: 6/28/2025 4:56 PM EDT Indication: Pulmonary Embolism. Comparison: None available. Technique: Axial CT images were obtained of the chest, abdomen, and pelvis after the uneventful intravenous administration of iodinated contrast utilizing pulmonary embolism protocol.  Reconstructed coronal and sagittal images were also obtained. In addition, a 3-D volume rendered image was created for interpretation.  Automated exposure control and iterative construction methods were used. Findings: No consolidation. No pneumothorax or pleural effusion. The thyroid gland is normal. The subglottic airway is clear. No evidence of aortic dissection. 3.7 cm prominence of the main pulmonary artery which may indicate pulmonary arterial hypertension. No pulmonary embolus. No reflux of IV contrast into the hepatic veins. No mediastinal, perihilar, or axillary adenopathy. No supraclavicular adenopathy. Thoracic vertebral body height and alignment are normal. The sternum is intact. No rib fractures. Liver:No masses. No intrahepatic biliary ductal dilatation. Spleen:No masses. No perisplenic hematoma. Pancreas:No pancreatic masses. No evidence of pancreatitis. Gallbladder and common bile duct: Prior cholecystectomy  Adrenal glands:No adrenal masses Kidneys and ureters:No kidney stones. No renal masses.No calculi present within the ureters. Normal caliber ureters. Urinary bladder:No urinary bladder wall thickening. No bladder masses. Small bowel:Normal caliber small bowel. Large bowel:No diverticulosis or diverticulitis. No large bowel masses are appreciated Appendix: Normal GENITOURINARY: Normal appearance of the uterus. Ascites or pneumoperitoneum:None. Adenopathy:None present Osseous structures: The proximal femurs are intact. The pubic bones are intact. Degenerative changes of the hips. The sacrum and sacroiliac joints are normal. Degenerative changes of the lumbar spine. The spinous and transverse processes are intact. No rib fractures. Other findings: Postsurgical changes related to salpingectomy. No acute pathology demonstrated.     1. No pulmonary embolus. No acute cardiopulmonary disease. 2. Postsurgical changes. No acute pathology demonstrated. Electronically Signed: Darrell Beltrán MD  6/28/2025 5:40 PM EDT  Workstation ID: [id number]    XR Chest 1 View  Result Date: 6/28/2025  XR CHEST 1 VW Date of Exam: 6/28/2025 3:15 PM EDT Indication: Chest Pain Triage Protocol Comparison: 4/27/2025 Findings: Cardiomediastinal silhouette is unremarkable.  No airspace disease, pneumothorax, nor pleural effusion. No acute osseous abnormality identified.     Impression: No active disease. Electronically Signed: Bayron Velazquez MD  6/28/2025 3:21 PM EDT  Workstation ID: [id number]        Differential Diagnosis and Discussion:    Abdominal Pain: Based on the patient's signs and symptoms, I considered abdominal aortic aneurysm, small bowel obstruction, pancreatitis, acute cholecystitis, acute appendecitis, peptic ulcer disease, gastritis, colitis, endocrine disorders, irritable bowel syndrome and other differential diagnosis an etiology of the patient's abdominal pain.  Chest Pain:  Based on the patient's signs and symptoms, I considered aortic  dissection, myocardial infaction, pulmonary embolism, cardiac tamponade, pericarditis, pneumothorax, musculoskeletal chest pain and other differential diagnosis as an etiology of the patient's chest pain.     PROCEDURES:    Labs were collected in the emergency department and all labs were reviewed and interpreted by me.  X-ray were performed in the emergency department and all X-ray impressions were independently interpreted by me.  An EKG was performed and the EKG was interpreted by me.  CT scan was performed in the emergency department and the CT scan radiology impression was interpreted by me.    ECG 12 Lead ED Triage Standing Order; Chest Pain   Preliminary Result   HEART RATE=73  bpm   RR Hytuqddg=259  ms   ND Zatlmtmx=757  ms   P Horizontal Axis=-2  deg   P Front Axis=54  deg   QRSD Interval=85  ms   QT Oazldpko=904  ms   QMnJ=663  ms   QRS Axis=9  deg   T Wave Axis=47  deg   - OTHERWISE NORMAL ECG -   Sinus rhythm   Low voltage, extremity leads   Date and Time of Study:2025-06-28 14:14:32          Procedures    MDM     Amount and/or Complexity of Data Reviewed  Clinical lab tests: reviewed  Tests in the radiology section of CPT®: reviewed  Tests in the medicine section of CPT®: reviewed         Patient is a 39-year-old who presents with complaints of abdominal pain and chest pain.  Had a recent surgery earlier this week where she had her ovary taken out.  She has been having pain in her lower abdomen at her surgical incision site since her surgery.  This does not appear to be infected and is not draining and appears to be intact.  CT was done and there is no acute findings within the belly.  She also complains that she was having some chest pain and just did not feel well.  Does have a history of blood clots in the past and was off Xarelto before surgery.  CT was done which did not show any acute findings either.  This time she is otherwise well-appearing.  Appears to be postoperative pain at her surgical  incision site.  At this time recommend continuing her normal postoperative pain medication and follow-up as an outpatient.  Should she have new or worsening symptoms return to emergency room.              Patient Care Considerations:          Consultants/Shared Management Plan:    None    Social Determinants of Health:    Patient is independent, reliable, and has access to care.       Disposition and Care Coordination:    Discharged: I considered escalation of care by admitting this patient to the hospital, however needed imaging and negative troponins    I have explained the patient´s condition, diagnoses and treatment plan based on the information available to me at this time. I have answered questions and addressed any concerns. The patient has a good  understanding of the patient´s diagnosis, condition, and treatment plan as can be expected at this point. The vital signs have been stable. The patient´s condition is stable and appropriate for discharge from the emergency department.      The patient will pursue further outpatient evaluation with the primary care physician or other designated or consulting physician as outlined in the discharge instructions. They are agreeable to this plan of care and follow-up instructions have been explained in detail. The patient has received these instructions in written format and have expressed an understanding of the discharge instructions. The patient is aware that any significant change in condition or worsening of symptoms should prompt an immediate return to this or the closest emergency department or call to 911.      Final diagnoses:   Abdominal pain, unspecified abdominal location   Chest pain, unspecified type        ED Disposition       ED Disposition   Discharge    Condition   Stable    Comment   --               This medical record created using voice recognition software.             Ernst Peters MD  06/28/25 0263

## 2025-07-05 LAB
QT INTERVAL: 380 MS
QTC INTERVAL: 419 MS

## 2025-07-29 ENCOUNTER — APPOINTMENT (OUTPATIENT)
Dept: CT IMAGING | Facility: HOSPITAL | Age: 40
End: 2025-07-29
Payer: MEDICARE

## 2025-07-29 ENCOUNTER — HOSPITAL ENCOUNTER (EMERGENCY)
Facility: HOSPITAL | Age: 40
Discharge: HOME | End: 2025-07-29
Attending: EMERGENCY MEDICINE | Admitting: EMERGENCY MEDICINE
Payer: MEDICARE

## 2025-07-29 VITALS
WEIGHT: 222.44 LBS | SYSTOLIC BLOOD PRESSURE: 117 MMHG | TEMPERATURE: 98.2 F | BODY MASS INDEX: 40.93 KG/M2 | RESPIRATION RATE: 16 BRPM | DIASTOLIC BLOOD PRESSURE: 79 MMHG | HEIGHT: 62 IN | HEART RATE: 64 BPM | OXYGEN SATURATION: 97 %

## 2025-07-29 DIAGNOSIS — K86.1 CHRONIC PANCREATITIS, UNSPECIFIED PANCREATITIS TYPE: ICD-10-CM

## 2025-07-29 DIAGNOSIS — M54.50 ACUTE BILATERAL LOW BACK PAIN WITHOUT SCIATICA: Primary | ICD-10-CM

## 2025-07-29 DIAGNOSIS — K76.0 HEPATIC STEATOSIS: ICD-10-CM

## 2025-07-29 LAB
ALBUMIN SERPL-MCNC: 4.9 G/DL (ref 3.5–5.2)
ALBUMIN/GLOB SERPL: 2 G/DL
ALP SERPL-CCNC: 85 U/L (ref 39–117)
ALT SERPL W P-5'-P-CCNC: 67 U/L (ref 1–33)
ANION GAP SERPL CALCULATED.3IONS-SCNC: 14.1 MMOL/L (ref 5–15)
AST SERPL-CCNC: 49 U/L (ref 1–32)
BASOPHILS # BLD AUTO: 0.09 10*3/MM3 (ref 0–0.2)
BASOPHILS NFR BLD AUTO: 0.9 % (ref 0–1.5)
BILIRUB SERPL-MCNC: 0.7 MG/DL (ref 0–1.2)
BILIRUB UR QL STRIP: NEGATIVE
BUN SERPL-MCNC: 7.3 MG/DL (ref 6–20)
BUN/CREAT SERPL: 11.4 (ref 7–25)
CALCIUM SPEC-SCNC: 10 MG/DL (ref 8.6–10.5)
CHLORIDE SERPL-SCNC: 102 MMOL/L (ref 98–107)
CLARITY UR: CLEAR
CO2 SERPL-SCNC: 22.9 MMOL/L (ref 22–29)
COLOR UR: YELLOW
CREAT SERPL-MCNC: 0.64 MG/DL (ref 0.57–1)
DEPRECATED RDW RBC AUTO: 44.9 FL (ref 37–54)
EGFRCR SERPLBLD CKD-EPI 2021: 114.7 ML/MIN/1.73
EOSINOPHIL # BLD AUTO: 0.57 10*3/MM3 (ref 0–0.4)
EOSINOPHIL NFR BLD AUTO: 5.9 % (ref 0.3–6.2)
ERYTHROCYTE [DISTWIDTH] IN BLOOD BY AUTOMATED COUNT: 13.4 % (ref 12.3–15.4)
GLOBULIN UR ELPH-MCNC: 2.4 GM/DL
GLUCOSE SERPL-MCNC: 93 MG/DL (ref 65–99)
GLUCOSE UR STRIP-MCNC: NEGATIVE MG/DL
HCG INTACT+B SERPL-ACNC: <0.5 MIU/ML
HCT VFR BLD AUTO: 50.8 % (ref 34–46.6)
HGB BLD-MCNC: 17.2 G/DL (ref 12–15.9)
HGB UR QL STRIP.AUTO: NEGATIVE
HOLD SPECIMEN: NORMAL
HOLD SPECIMEN: NORMAL
IMM GRANULOCYTES # BLD AUTO: 0.03 10*3/MM3 (ref 0–0.05)
IMM GRANULOCYTES NFR BLD AUTO: 0.3 % (ref 0–0.5)
KETONES UR QL STRIP: NEGATIVE
LEUKOCYTE ESTERASE UR QL STRIP.AUTO: NEGATIVE
LIPASE SERPL-CCNC: 23 U/L (ref 13–60)
LYMPHOCYTES # BLD AUTO: 4.24 10*3/MM3 (ref 0.7–3.1)
LYMPHOCYTES NFR BLD AUTO: 43.6 % (ref 19.6–45.3)
MCH RBC QN AUTO: 30.8 PG (ref 26.6–33)
MCHC RBC AUTO-ENTMCNC: 33.9 G/DL (ref 31.5–35.7)
MCV RBC AUTO: 91 FL (ref 79–97)
MONOCYTES # BLD AUTO: 0.69 10*3/MM3 (ref 0.1–0.9)
MONOCYTES NFR BLD AUTO: 7.1 % (ref 5–12)
NEUTROPHILS NFR BLD AUTO: 4.1 10*3/MM3 (ref 1.7–7)
NEUTROPHILS NFR BLD AUTO: 42.2 % (ref 42.7–76)
NITRITE UR QL STRIP: NEGATIVE
NRBC BLD AUTO-RTO: 0 /100 WBC (ref 0–0.2)
PH UR STRIP.AUTO: 6 [PH] (ref 5–8)
PLATELET # BLD AUTO: 262 10*3/MM3 (ref 140–450)
PMV BLD AUTO: 9.7 FL (ref 6–12)
POTASSIUM SERPL-SCNC: 4.1 MMOL/L (ref 3.5–5.2)
PROT SERPL-MCNC: 7.3 G/DL (ref 6–8.5)
PROT UR QL STRIP: NEGATIVE
RBC # BLD AUTO: 5.58 10*6/MM3 (ref 3.77–5.28)
SODIUM SERPL-SCNC: 139 MMOL/L (ref 136–145)
SP GR UR STRIP: 1.02 (ref 1–1.03)
UROBILINOGEN UR QL STRIP: NORMAL
WBC NRBC COR # BLD AUTO: 9.72 10*3/MM3 (ref 3.4–10.8)
WHOLE BLOOD HOLD COAG: NORMAL
WHOLE BLOOD HOLD SPECIMEN: NORMAL

## 2025-07-29 PROCEDURE — 81003 URINALYSIS AUTO W/O SCOPE: CPT | Performed by: EMERGENCY MEDICINE

## 2025-07-29 PROCEDURE — 25010000002 ONDANSETRON PER 1 MG

## 2025-07-29 PROCEDURE — 80053 COMPREHEN METABOLIC PANEL: CPT | Performed by: EMERGENCY MEDICINE

## 2025-07-29 PROCEDURE — 85025 COMPLETE CBC W/AUTO DIFF WBC: CPT | Performed by: EMERGENCY MEDICINE

## 2025-07-29 PROCEDURE — 99285 EMERGENCY DEPT VISIT HI MDM: CPT

## 2025-07-29 PROCEDURE — 84702 CHORIONIC GONADOTROPIN TEST: CPT | Performed by: EMERGENCY MEDICINE

## 2025-07-29 PROCEDURE — 83690 ASSAY OF LIPASE: CPT | Performed by: EMERGENCY MEDICINE

## 2025-07-29 PROCEDURE — 96375 TX/PRO/DX INJ NEW DRUG ADDON: CPT

## 2025-07-29 PROCEDURE — 96374 THER/PROPH/DIAG INJ IV PUSH: CPT

## 2025-07-29 PROCEDURE — 25010000002 KETOROLAC TROMETHAMINE PER 15 MG

## 2025-07-29 PROCEDURE — 25510000001 IOPAMIDOL PER 1 ML: Performed by: EMERGENCY MEDICINE

## 2025-07-29 PROCEDURE — 25810000003 SODIUM CHLORIDE 0.9 % SOLUTION

## 2025-07-29 PROCEDURE — 74177 CT ABD & PELVIS W/CONTRAST: CPT

## 2025-07-29 RX ORDER — BACLOFEN 10 MG/1
10 TABLET ORAL 3 TIMES DAILY PRN
Qty: 90 TABLET | Refills: 0 | Status: SHIPPED | OUTPATIENT
Start: 2025-07-29

## 2025-07-29 RX ORDER — IOPAMIDOL 755 MG/ML
100 INJECTION, SOLUTION INTRAVASCULAR
Status: COMPLETED | OUTPATIENT
Start: 2025-07-29 | End: 2025-07-29

## 2025-07-29 RX ORDER — SODIUM CHLORIDE 0.9 % (FLUSH) 0.9 %
10 SYRINGE (ML) INJECTION AS NEEDED
Status: DISCONTINUED | OUTPATIENT
Start: 2025-07-29 | End: 2025-07-30 | Stop reason: HOSPADM

## 2025-07-29 RX ORDER — KETOROLAC TROMETHAMINE 30 MG/ML
30 INJECTION, SOLUTION INTRAMUSCULAR; INTRAVENOUS ONCE
Status: COMPLETED | OUTPATIENT
Start: 2025-07-29 | End: 2025-07-29

## 2025-07-29 RX ORDER — ONDANSETRON 2 MG/ML
4 INJECTION INTRAMUSCULAR; INTRAVENOUS ONCE
Status: COMPLETED | OUTPATIENT
Start: 2025-07-29 | End: 2025-07-29

## 2025-07-29 RX ADMIN — KETOROLAC TROMETHAMINE 30 MG: 30 INJECTION, SOLUTION INTRAMUSCULAR; INTRAVENOUS at 22:23

## 2025-07-29 RX ADMIN — IOPAMIDOL 85 ML: 755 INJECTION, SOLUTION INTRAVENOUS at 22:54

## 2025-07-29 RX ADMIN — SODIUM CHLORIDE 1000 ML: 9 INJECTION, SOLUTION INTRAVENOUS at 22:22

## 2025-07-29 RX ADMIN — ONDANSETRON 4 MG: 2 INJECTION, SOLUTION INTRAMUSCULAR; INTRAVENOUS at 22:23

## 2025-07-30 NOTE — ED PROVIDER NOTES
Time: 9:22 PM EDT  Date of encounter:  2025  Independent Historian/Clinical History and Information was obtained by:   Patient    History is limited by: N/A    Chief Complaint: Bilateral flank pain      History of Present Illness:  Patient is a 39 y.o. year old female who presents to the emergency department for evaluation of bilateral flank pain.  Patient states that it started yesterday.  Patient states the pain is on both sides and radiates into her abdomen.  Patient denies dysuria, hematuria, vaginal discharge, or prior abdominal issues.      Patient Care Team  Primary Care Provider: Tricia Esparza MD    Past Medical History:     Allergies   Allergen Reactions    Sumatriptan Shortness Of Breath    Amoxicillin Hives     Hives and blisters    Tylenol [Acetaminophen] Hives     Past Medical History:   Diagnosis Date    Abnormal ECG     Acid reflux     Anemia     Anxiety 2014    Asthma     Bleeding disorder     Cholelithiasis     Clotting disorder     Deep vein thrombosis     Depression     Foot sprain, right, initial encounter 10/21/2015    GERD (gastroesophageal reflux disease)     HTN (hypertension)     gestational    Migraine headache     Pancreatitis 2022    [facility] -[facility]    Pineal gland cyst 2014    Renal cyst 2014     Past Surgical History:   Procedure Laterality Date     SECTION      CHOLECYSTECTOMY      COLONOSCOPY N/A 2023    Procedure: COLONOSCOPY WITH BIOPSIES;  Surgeon: Feliz Olguin MD;  Location: [facility] ENDOSCOPY;  Service: Gastroenterology;  Laterality: N/A;  NORMAL COLONOSCOPY    ENDOSCOPY N/A 10/22/2021    Procedure: ESOPHAGOGASTRODUODENOSCOPY WITH BIOPSIES;  Surgeon: Feliz Olguin MD;  Location: [facility] ENDOSCOPY;  Service: Gastroenterology;  Laterality: N/A;  NORMAL EGD    ENDOSCOPY N/A 2023    Procedure: ESOPHAGOGASTRODUODENOSCOPY WITH BIOPSIES;  Surgeon: Feliz Olguin MD;  Location: [facility] ENDOSCOPY;  Service:  Gastroenterology;  Laterality: N/A;  HIATAL HERNIA    ENDOSCOPY N/A 3/22/2024    Procedure: ESOPHAGOGASTRODUODENOSCOPY WITH BIOPSIES;  Surgeon: Feliz Olguin MD;  Location: [facility] ENDOSCOPY;  Service: Gastroenterology;  Laterality: N/A;  RETAINED FOOD IN THE STOMACH, EROSIVE GASTRITIS, REFLUX ESOPHAGITIS    LASER ABLATION      TUBAL ABDOMINAL LIGATION  03/27/2012     Family History   Problem Relation Age of Onset    Diabetes Mother     Arthritis Mother     Anxiety disorder Mother     Hyperlipidemia Mother     Liver disease Mother     Stroke Mother     Thyroid disease Mother     Other Father         cardio vascular disease    Diabetes Father     Kidney failure Father     Arthritis Father     Colon cancer Neg Hx        Home Medications:  Prior to Admission medications    Medication Sig Start Date End Date Taking? Authorizing Provider   albuterol sulfate  (90 Base) MCG/ACT inhaler Inhale 2 puffs Every 4 (Four) Hours As Needed for Wheezing. 12/28/24   Mj Lala DO   amitriptyline (ELAVIL) 25 MG tablet Take 1 tablet by mouth Every Night. 9/15/23   Khari Mar MD   amitriptyline (ELAVIL) 50 MG tablet Take 1 tablet by mouth every night at bedtime. 8/16/24   Khari Mar MD   fenofibrate (TRICOR) 145 MG tablet Take 1 tablet by mouth. 11/28/23   Khari Mar MD   levETIRAcetam (KEPPRA) 500 MG tablet Take 1 tablet by mouth 2 (Two) Times a Day.    Khari Mar MD   montelukast (SINGULAIR) 10 MG tablet Take 1 tablet by mouth Every Night.    Khari Mar MD   naproxen (NAPROSYN) 500 MG tablet Take 1 tablet by mouth. 11/22/24   Khari Mar MD   Pancrelipase, Lip-Prot-Amyl, (Creon) 52356-940880 units capsule delayed-release particles capsule Take 1 capsule by mouth Take As Directed. Take 2 capsule with every meal and 1 capsule with snacks 10/20/23   Thao Valles APRN   pantoprazole (PROTONIX) 20 MG EC tablet Take 1 tablet by mouth Daily. 9/28/24    "Seaver, Alyce B, APRN   propranolol (INDERAL) 60 MG tablet Take 1 tablet by mouth 2 (Two) Times a Day. 9/15/23   Khari Mar MD   rivaroxaban (XARELTO) 20 MG tablet Take 1 tablet by mouth Daily.    Khari Mar MD   sertraline (ZOLOFT) 50 MG tablet Take 1 tablet by mouth Daily. 1/3/23   Khari Mar MD   Ubrelvy 100 MG tablet Take 1 tablet by mouth at onset of migraine, may repeat in 2 hours if no relief, max of 2 per day 3/14/25   Khari Mar MD   vitamin D (ERGOCALCIFEROL) 1.25 MG (28507 UT) capsule capsule Take 1 capsule by mouth 1 (One) Time Per Week. Monday    Khari Mar MD        Social History:   Social History     Tobacco Use    Smoking status: Former     Current packs/day: 0.00     Average packs/day: 1 pack/day for 15.0 years (15.0 ttl pk-yrs)     Types: Cigarettes     Start date: 2007     Quit date: 2022     Years since quittin.7     Passive exposure: Past    Smokeless tobacco: Never   Vaping Use    Vaping status: Never Used   Substance Use Topics    Alcohol use: Never    Drug use: Never         Review of Systems:  Review of Systems   Constitutional:  Negative for chills, diaphoresis and fever.   Respiratory:  Negative for shortness of breath.    Gastrointestinal:  Positive for nausea. Negative for vomiting.   Genitourinary:  Positive for flank pain. Negative for dysuria, hematuria, urgency and vaginal discharge.        Physical Exam:  /79   Pulse 70   Temp 98.2 °F (36.8 °C) (Oral)   Resp 18   Ht 157.5 cm (62\")   Wt 101 kg (222 lb 7.1 oz)   SpO2 97%   BMI 40.69 kg/m²     Physical Exam  HENT:      Head: Normocephalic.      Mouth/Throat:      Mouth: Mucous membranes are moist.   Eyes:      Pupils: Pupils are equal, round, and reactive to light.   Pulmonary:      Effort: Pulmonary effort is normal.   Abdominal:      General: There is no distension.      Tenderness: There is right CVA tenderness and left CVA tenderness.   Musculoskeletal: "      Cervical back: Neck supple.   Skin:     General: Skin is warm and dry.   Neurological:      General: No focal deficit present.      Mental Status: She is alert and oriented to person, place, and time.   Psychiatric:         Mood and Affect: Mood normal.         Behavior: Behavior normal.                    Medical Decision Making:      Comorbidities that affect care:    Asthma, Hypertension, Obesity    External Notes reviewed:    Previous ED Note: Patient seen last month for abdominal pain      The following orders were placed and all results were independently analyzed by me:  Orders Placed This Encounter   Procedures    CT Abdomen Pelvis With Contrast    [place] Draw    Comprehensive Metabolic Panel    Lipase    Urinalysis With Microscopic If Indicated (No Culture) - Urine, Clean Catch    hCG, Quantitative, Pregnancy    CBC Auto Differential    NPO Diet NPO Type: Strict NPO    Undress & Gown    Insert Peripheral IV    CBC & Differential    Green Top (Gel)    Lavender Top    Gold Top - SST    Light Blue Top       Medications Given in the Emergency Department:  Medications   sodium chloride 0.9 % flush 10 mL (has no administration in time range)   sodium chloride 0.9 % bolus 1,000 mL (1,000 mL Intravenous New Bag 7/29/25 2222)   ondansetron (ZOFRAN) injection 4 mg (4 mg Intravenous Given 7/29/25 2223)   ketorolac (TORADOL) injection 30 mg (30 mg Intravenous Given 7/29/25 2223)   iopamidol (ISOVUE-370) 76 % injection 100 mL (85 mL Intravenous Given 7/29/25 2254)        ED Course:    ED Course as of 07/29/25 2311 Tue Jul 29, 2025   2310 CBC & Differential(!)  The patient´s CBC that was reviewed and interpreted by me shows no abnormalities of critical concern. Of note, there is no anemia requiring a blood transfusion and the platelet count is acceptable. [AS]   2310 Comprehensive Metabolic Panel(!)  The patient´s CMP that was reviewed and interpretted by me shows no abnormalities of critical concern. Of note,  the patient´s sodium and potassium are acceptable. The patient´s liver enzymes are unremarkable. The patient´s renal function (creatinine) is preserved. The patient has a normal anion gap. [AS]      ED Course User Index  [AS] Seaver, Alyce B, APRN       Labs:    Lab Results (last 24 hours)       Procedure Component Value Units Date/Time    Urinalysis With Microscopic If Indicated (No Culture) - Urine, Clean Catch [[id number]]  (Normal) Collected: 07/29/25 2200    Specimen: Urine, Clean Catch Updated: 07/29/25 2213     Color, UA Yellow     Appearance, UA Clear     pH, UA 6.0     Specific Gravity, UA 1.017     Glucose, UA Negative     Ketones, UA Negative     Bilirubin, UA Negative     Blood, UA Negative     Protein, UA Negative     Leuk Esterase, UA Negative     Nitrite, UA Negative     Urobilinogen, UA 1.0 E.U./dL    Narrative:      Urine microscopic not indicated.    CBC & Differential [[id number]]  (Abnormal) Collected: 07/29/25 2206    Specimen: Blood Updated: 07/29/25 2210    Narrative:      The following orders were created for panel order CBC & Differential.  Procedure                               Abnormality         Status                     ---------                               -----------         ------                     CBC Auto Differential[[id number]]        Abnormal            Final result                 Please view results for these tests on the individual orders.    Comprehensive Metabolic Panel [[id number]]  (Abnormal) Collected: 07/29/25 2206    Specimen: Blood Updated: 07/29/25 2226     Glucose 93 mg/dL      BUN 7.3 mg/dL      Creatinine 0.64 mg/dL      Sodium 139 mmol/L      Potassium 4.1 mmol/L      Comment: Slight hemolysis detected by analyzer. Result may be falsely elevated.        Chloride 102 mmol/L      CO2 22.9 mmol/L      Calcium 10.0 mg/dL      Total Protein 7.3 g/dL      Albumin 4.9 g/dL      ALT (SGPT) 67 U/L      AST (SGOT) 49 U/L      Alkaline Phosphatase 85 U/L      Total  Bilirubin 0.7 mg/dL      Globulin 2.4 gm/dL      A/G Ratio 2.0 g/dL      BUN/Creatinine Ratio 11.4     Anion Gap 14.1 mmol/L      eGFR 114.7 mL/min/1.73     Narrative:      GFR Categories in Chronic Kidney Disease (CKD)              GFR Category          GFR (mL/min/1.73)    Interpretation  G1                    90 or greater        Normal or high (1)  G2                    60-89                Mild decrease (1)  G3a                   45-59                Mild to moderate decrease  G3b                   30-44                Moderate to severe decrease  G4                    15-29                Severe decrease  G5                    14 or less           Kidney failure    (1)In the absence of evidence of kidney disease, neither GFR category G1 or G2 fulfill the criteria for CKD.    eGFR calculation 2021 CKD-EPI creatinine equation, which does not include race as a factor    Lipase [[id number]]  (Normal) Collected: 07/29/25 2206    Specimen: Blood Updated: 07/29/25 2226     Lipase 23 U/L     hCG, Quantitative, Pregnancy [[id number]] Collected: 07/29/25 2206    Specimen: Blood Updated: 07/29/25 2224     HCG Quantitative <0.50 mIU/mL     Narrative:      HCG Ranges by Gestational Age    Females - non-pregnant premenopausal   </= 1mIU/mL HCG  Females - postmenopausal               </= 7mIU/mL HCG    3 Weeks       5.4   -      72 mIU/mL  4 Weeks      10.2   -     708 mIU/mL  5 Weeks       217   -   8,245 mIU/mL  6 Weeks       152   -  32,177 mIU/mL  7 Weeks     4,059   - 153,767 mIU/mL  8 Weeks    31,366   - 149,094 mIU/mL  9 Weeks    59,109   - 135,901 mIU/mL  10 Weeks   44,186   - 170,409 mIU/mL  12 Weeks   27,107   - 201,615 mIU/mL  14 Weeks   24,302   -  93,646 mIU/mL  15 Weeks   12,540   -  69,747 mIU/mL  16 Weeks    8,904   -  55,332 mIU/mL  17 Weeks    8,240   -  51,793 mIU/mL  18 Weeks    9,649   -  55,271 mIU/mL      CBC Auto Differential [[id number]]  (Abnormal) Collected: 07/29/25 2206    Specimen: Blood Updated:  07/29/25 2210     WBC 9.72 10*3/mm3      RBC 5.58 10*6/mm3      Hemoglobin 17.2 g/dL      Hematocrit 50.8 %      MCV 91.0 fL      MCH 30.8 pg      MCHC 33.9 g/dL      RDW 13.4 %      RDW-SD 44.9 fl      MPV 9.7 fL      Platelets 262 10*3/mm3      Neutrophil % 42.2 %      Lymphocyte % 43.6 %      Monocyte % 7.1 %      Eosinophil % 5.9 %      Basophil % 0.9 %      Immature Grans % 0.3 %      Neutrophils, Absolute 4.10 10*3/mm3      Lymphocytes, Absolute 4.24 10*3/mm3      Monocytes, Absolute 0.69 10*3/mm3      Eosinophils, Absolute 0.57 10*3/mm3      Basophils, Absolute 0.09 10*3/mm3      Immature Grans, Absolute 0.03 10*3/mm3      nRBC 0.0 /100 WBC              Imaging:    CT Abdomen Pelvis With Contrast  Result Date: 7/29/2025  CT ABDOMEN PELVIS W CONTRAST Date of Exam: 7/29/2025 10:42 PM EDT Indication: bilateral flank pain; r/o pyelo or stone. Comparison: 6/28/2025 Technique: Axial CT images were obtained of the abdomen and pelvis after the uneventful intravenous administration of iodinated contrast. Reconstructed coronal and sagittal images were also obtained. Automated exposure control and iterative construction methods were used. Findings: Visualized Chest:  The visualized lung bases and lower mediastinal structures are unremarkable. Liver: Mild to moderate hepatic steatosis Gallbladder: Status post cholecystectomy Bile Ducts: No billiary dcutal dilation. Spleen: Spleen is normal in size and CT density. Pancreas: Mild fat stranding and edema noted surrounding the pancreatic parenchyma. No definite discrete fluid collection, pancreatic ductal dilation or hypoenhancement is noted. Adrenals: Adrenal glands are unremarkable. Kidneys: Kidneys are normal in size. There are no stones or hydronephrosis. Gastrointestinal: Unremarkable. Bladder: The bladder is normal. Pelvis: Findings suggestive of bilateral tubal ligation. Otherwise unremarkable Peritoneum/Mesentery: No fluid collection, ascities, or free air.   Lymph  Nodes: No lymphadenopathy. 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 Distally the vessel and their branches are unremarkable. Abdominal Wall: Unremarkable Bony Structures: No acute osseous abnormality     Impression: 1.Mild fat stranding and edema noted surrounding the pancreatic parenchyma. This may represent acute pancreatitis, although similar findings were noted on multiple prior study and may represent fatty atrophy. Alternatively this may represent fatty atrophy of the pancreatic parenchyma. No discrete fluid collection, pancreatic ductal dilation or hypoenhancement is noted. 2.Mild to moderate hepatic steatosis. 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 Distally the vessel and its branches are unremarkable. Electronically Signed: Bernardino German DO  7/29/2025 11:02 PM EDT  Workstation ID: [id number]        Differential Diagnosis and Discussion:    Flank Pain: Differential diagnosis includes but is not limited to kidney stones, pyelonephritis, musculoskeletal disorders, renal infarction, urinary tract infection, hydronephrosis, radiculopathy, aortic aneurysm, renal cell carcinoma.    PROCEDURES:    Labs were collected in the emergency department and all labs were reviewed and interpreted by me.  CT scan was performed in the emergency department and the CT scan radiology impression was interpreted by me.    No orders to display       Procedures    MDM     Amount and/or Complexity of Data Reviewed  Clinical lab tests: reviewed             Patient Care Considerations:    ANTIBIOTICS: I considered prescribing antibiotics as an outpatient however no bacterial focus of infection was found.      Consultants/Shared Management Plan:    SHARED VISIT: I have discussed the case with my supervising physician, Dr. Bernal who states he agrees with plan of care. The substantive portion of the medical decision was made by the attesting physician  who made or approve the management plan and will take responsibility for the patient.  Clinical findings were discussed and ultimate disposition was made in consult with supervising physician.    Social Determinants of Health:    Patient is independent, reliable, and has access to care.       Disposition and Care Coordination:    Discharged: The patient is suitable and stable for discharge with no need for consideration of admission.    I have explained the patient´s condition, diagnoses and treatment plan based on the information available to me at this time. I have answered questions and addressed any concerns. The patient has a good  understanding of the patient´s diagnosis, condition, and treatment plan as can be expected at this point. The vital signs have been stable. The patient´s condition is stable and appropriate for discharge from the emergency department.      The patient will pursue further outpatient evaluation with the primary care physician or other designated or consulting physician as outlined in the discharge instructions. They are agreeable to this plan of care and follow-up instructions have been explained in detail. The patient has received these instructions in written format and has expressed an understanding of the discharge instructions. The patient is aware that any significant change in condition or worsening of symptoms should prompt an immediate return to this or the closest emergency department or call to 911.  I have explained discharge medications and the need for follow up with the patient/caretakers. This was also printed in the discharge instructions. Patient was discharged with the following medications and follow up:      Medication List        New Prescriptions      baclofen 10 MG tablet  Commonly known as: LIORESAL  Take 1 tablet by mouth 3 (Three) Times a Day As Needed for Muscle Spasms.               Where to Get Your Medications        These medications were sent to [facility]  [facility] - [place], [place] - [address] [address] [facility] [address] [facility] - [phone]  - [phone] FX  [address] [address] [place] [address]      Phone: [phone]   baclofen 10 MG tablet      Tricia sEparza MD  [address] [place] [id number] [phone]             Final diagnoses:   Acute bilateral low back pain without sciatica   Chronic pancreatitis, unspecified pancreatitis type   Hepatic steatosis        ED Disposition       ED Disposition   Discharge    Condition   Stable    Comment   --               This medical record created using voice recognition software.             Seaver, Alyce B, APRN  07/29/25 9948

## 2025-07-30 NOTE — DISCHARGE INSTRUCTIONS
Follow up with your primary care provider. Return to ER if symptoms worsen or fail to improve.  Take Tylenol as needed for pain but do not exceed 4 g in 24 hours.  Take baclofen as needed for muscle spasms, this medication may make you drowsy so do not drive or operate machinery while taking.  Follow-up with your gastroenterologist for chronic pancreatitis.

## (undated) DEVICE — LINER SURG CANSTR SXN S/RIGD 1500CC

## (undated) DEVICE — EGD OR ERCP KIT: Brand: MEDLINE INDUSTRIES, INC.

## (undated) DEVICE — BLCK/BITE BLOX WO/DENTL/RIM W/STRAP 54F

## (undated) DEVICE — SINGLE-USE BIOPSY FORCEPS: Brand: RADIAL JAW 4

## (undated) DEVICE — CONN JET HYDRA H20 AUXILIARY DISP

## (undated) DEVICE — Device

## (undated) DEVICE — SOL IRRG H2O PL/BG 1000ML STRL

## (undated) DEVICE — Device: Brand: DEFENDO AIR/WATER/SUCTION AND BIOPSY VALVE

## (undated) DEVICE — SOLIDIFIER LIQLOC PLS 1500CC BT